# Patient Record
Sex: MALE | Race: WHITE | NOT HISPANIC OR LATINO | Employment: PART TIME | URBAN - METROPOLITAN AREA
[De-identification: names, ages, dates, MRNs, and addresses within clinical notes are randomized per-mention and may not be internally consistent; named-entity substitution may affect disease eponyms.]

---

## 2017-03-09 ENCOUNTER — HOSPITAL ENCOUNTER (INPATIENT)
Facility: CLINIC | Age: 27
LOS: 2 days | Discharge: HOME | DRG: 881 | End: 2017-03-11
Attending: PSYCHIATRY & NEUROLOGY | Admitting: PSYCHIATRY & NEUROLOGY
Payer: COMMERCIAL

## 2017-03-09 DIAGNOSIS — F33.2 SEVERE EPISODE OF RECURRENT MAJOR DEPRESSIVE DISORDER, WITHOUT PSYCHOTIC FEATURES (H): ICD-10-CM

## 2017-03-09 DIAGNOSIS — H66.90 ACUTE OTITIS MEDIA, UNSPECIFIED LATERALITY, UNSPECIFIED OTITIS MEDIA TYPE: Primary | ICD-10-CM

## 2017-03-09 PROBLEM — R45.851 SUICIDAL IDEATION: Status: ACTIVE | Noted: 2017-03-09

## 2017-03-09 LAB
AMPHETAMINES UR QL SCN: ABNORMAL
BARBITURATES UR QL: ABNORMAL
BENZODIAZ UR QL: ABNORMAL
CANNABINOIDS UR QL SCN: ABNORMAL
COCAINE UR QL: ABNORMAL
ETHANOL UR QL SCN: ABNORMAL
OPIATES UR QL SCN: ABNORMAL

## 2017-03-09 PROCEDURE — 12400001 ZZH R&B MH UMMC

## 2017-03-09 PROCEDURE — 99285 EMERGENCY DEPT VISIT HI MDM: CPT | Performed by: PSYCHIATRY & NEUROLOGY

## 2017-03-09 PROCEDURE — 80307 DRUG TEST PRSMV CHEM ANLYZR: CPT | Performed by: PSYCHIATRY & NEUROLOGY

## 2017-03-09 PROCEDURE — 80320 DRUG SCREEN QUANTALCOHOLS: CPT | Performed by: PSYCHIATRY & NEUROLOGY

## 2017-03-09 PROCEDURE — 99284 EMERGENCY DEPT VISIT MOD MDM: CPT | Mod: Z6 | Performed by: PSYCHIATRY & NEUROLOGY

## 2017-03-09 PROCEDURE — 25000132 ZZH RX MED GY IP 250 OP 250 PS 637: Performed by: PSYCHIATRY & NEUROLOGY

## 2017-03-09 PROCEDURE — 90791 PSYCH DIAGNOSTIC EVALUATION: CPT

## 2017-03-09 RX ORDER — LORAZEPAM 1 MG/1
1 TABLET ORAL 3 TIMES DAILY
Status: DISCONTINUED | OUTPATIENT
Start: 2017-03-10 | End: 2017-03-11 | Stop reason: HOSPADM

## 2017-03-09 RX ORDER — ALUMINA, MAGNESIA, AND SIMETHICONE 2400; 2400; 240 MG/30ML; MG/30ML; MG/30ML
30 SUSPENSION ORAL EVERY 4 HOURS PRN
Status: DISCONTINUED | OUTPATIENT
Start: 2017-03-09 | End: 2017-03-11 | Stop reason: HOSPADM

## 2017-03-09 RX ORDER — ESTRADIOL 1 MG/1
2 TABLET ORAL
Status: DISCONTINUED | OUTPATIENT
Start: 2017-03-09 | End: 2017-03-11 | Stop reason: HOSPADM

## 2017-03-09 RX ORDER — OLANZAPINE 10 MG/2ML
10 INJECTION, POWDER, FOR SOLUTION INTRAMUSCULAR
Status: DISCONTINUED | OUTPATIENT
Start: 2017-03-09 | End: 2017-03-11 | Stop reason: HOSPADM

## 2017-03-09 RX ORDER — HALOPERIDOL 0.5 MG/1
1 TABLET ORAL 2 TIMES DAILY
Status: DISCONTINUED | OUTPATIENT
Start: 2017-03-09 | End: 2017-03-11 | Stop reason: HOSPADM

## 2017-03-09 RX ORDER — OLANZAPINE 10 MG/1
10 TABLET ORAL
Status: DISCONTINUED | OUTPATIENT
Start: 2017-03-09 | End: 2017-03-11 | Stop reason: HOSPADM

## 2017-03-09 RX ORDER — FLUTICASONE PROPIONATE 50 MCG
2 SPRAY, SUSPENSION (ML) NASAL DAILY
Status: DISCONTINUED | OUTPATIENT
Start: 2017-03-10 | End: 2017-03-11 | Stop reason: HOSPADM

## 2017-03-09 RX ORDER — FLUOXETINE 10 MG/1
20 CAPSULE ORAL DAILY
COMMUNITY
End: 2017-10-23

## 2017-03-09 RX ORDER — LORATADINE 10 MG/1
10 TABLET ORAL AT BEDTIME
Status: DISCONTINUED | OUTPATIENT
Start: 2017-03-09 | End: 2017-03-11 | Stop reason: HOSPADM

## 2017-03-09 RX ORDER — ESTRADIOL 1 MG/1
2 TABLET ORAL
COMMUNITY
End: 2017-08-16

## 2017-03-09 RX ORDER — LORAZEPAM 1 MG/1
1-4 TABLET ORAL EVERY 30 MIN PRN
Status: DISCONTINUED | OUTPATIENT
Start: 2017-03-09 | End: 2017-03-11 | Stop reason: HOSPADM

## 2017-03-09 RX ORDER — HYDROXYZINE HYDROCHLORIDE 25 MG/1
25-50 TABLET, FILM COATED ORAL EVERY 4 HOURS PRN
Status: DISCONTINUED | OUTPATIENT
Start: 2017-03-09 | End: 2017-03-11 | Stop reason: HOSPADM

## 2017-03-09 RX ORDER — EMTRICITABINE AND TENOFOVIR DISOPROXIL FUMARATE 200; 300 MG/1; MG/1
1 TABLET, FILM COATED ORAL DAILY
Status: DISCONTINUED | OUTPATIENT
Start: 2017-03-10 | End: 2017-03-11 | Stop reason: HOSPADM

## 2017-03-09 RX ORDER — ACYCLOVIR 200 MG/1
400 CAPSULE ORAL 2 TIMES DAILY
Status: DISCONTINUED | OUTPATIENT
Start: 2017-03-09 | End: 2017-03-11 | Stop reason: HOSPADM

## 2017-03-09 RX ORDER — ACETAMINOPHEN 325 MG/1
650 TABLET ORAL EVERY 4 HOURS PRN
Status: DISCONTINUED | OUTPATIENT
Start: 2017-03-09 | End: 2017-03-11 | Stop reason: HOSPADM

## 2017-03-09 RX ORDER — EMTRICITABINE AND TENOFOVIR DISOPROXIL FUMARATE 200; 300 MG/1; MG/1
1 TABLET, FILM COATED ORAL DAILY
COMMUNITY
End: 2020-10-27

## 2017-03-09 RX ADMIN — ESTRADIOL 2 MG: 1 TABLET ORAL at 21:58

## 2017-03-09 RX ADMIN — HALOPERIDOL 1 MG: 0.5 TABLET ORAL at 21:58

## 2017-03-09 RX ADMIN — ACYCLOVIR 400 MG: 200 CAPSULE ORAL at 21:59

## 2017-03-09 RX ADMIN — LORATADINE 10 MG: 10 TABLET ORAL at 21:58

## 2017-03-09 ASSESSMENT — ACTIVITIES OF DAILY LIVING (ADL)
TOILETING: 0-->INDEPENDENT
BATHING: 0-->INDEPENDENT
RETIRED_COMMUNICATION: 0-->UNDERSTANDS/COMMUNICATES WITHOUT DIFFICULTY
PRIOR_FUNCTIONAL_LEVEL_COMMENT: INDEPENDENT
RETIRED_EATING: 0-->INDEPENDENT
FALL_HISTORY_WITHIN_LAST_SIX_MONTHS: NO
TRANSFERRING: 0-->INDEPENDENT
SWALLOWING: 0-->SWALLOWS FOODS/LIQUIDS WITHOUT DIFFICULTY
AMBULATION: 0-->INDEPENDENT
COGNITION: 0 - NO COGNITION ISSUES REPORTED
DRESS: 0-->INDEPENDENT

## 2017-03-09 ASSESSMENT — ENCOUNTER SYMPTOMS
FEVER: 0
HALLUCINATIONS: 0
CHEST TIGHTNESS: 0
NERVOUS/ANXIOUS: 1
BACK PAIN: 0
DYSPHORIC MOOD: 1
ABDOMINAL PAIN: 0
SHORTNESS OF BREATH: 0
DIZZINESS: 0

## 2017-03-09 NOTE — SUMMARY OF CARE
Patient search completed; pt wearing scrub top, pt wanded with metal detector and belongings given to security to be stored. Explained to patient that they would be evaluated by a nurse here in the ER and then would go over to the [facility] when a bed is available where they will meet with a doctor and an accessor; plan will be determined from there.

## 2017-03-09 NOTE — IP AVS SNAPSHOT
MRN:[id number]                      After Visit Summary   3/9/2017    Nay Lainez    MRN: [id number]           Thank you!     Thank you for choosing [facility] for your care. Our goal is always to provide you with excellent care.        Patient Information     Date Of Birth          1990        About your hospital stay     You were admitted on:  March 9, 2017 You last received care in the:  [facility]    You were discharged on:  March 11, 2017       Who to Call     For medical emergencies, please call 911.  For non-urgent questions about your medical care, please call your primary care provider or clinic, [phone]          Attending Provider     Provider Specialty    Manny Singer MD Emergency Medicine    [facility]       Primary Care Provider Office Phone # Fax #    [facility] [phone]       [facility] [address] [facility] [address]        Further instructions from your care team       Behavioral Discharge Planning and Instructions    Summary:   Patient was admitted with worsening depression, suicidal ideation. Patient was feeling unsafe at home.  Medications were reviewed and adjusted per MD's. Patient was observed for safety. She reports she is feeling better and denies further thoughts of self harm.    Patent will return home and will resume care with her outpatient providers.    Main Diagnosis:   Major Depressive Disorder  Alcohol Use Disorder  Anorexia Nervosa     Major Treatments, Procedures and Findings:   Medications were  managed throughout your stay. An internal medicine consult was completed during your stay. You had the opportunity to participate in treatment programming while on the unit including occupational therapy, mental health support and education and spiritual services.     Symptoms to Report:   Please report if you are experiencing increased aggression and/or confusion, problematic loss of sleep,  worsening mood, or thoughts of suicide to your treatment team or notify your primary provider.   IF THE SYMPTOMS YOU ARE EXPERIENCING ARE A MEDICAL EMERGENCY, CALL 911 IMMEDIATELY    Lifestyle Adjustment:   1. Adjust your lifestyle to get enough sleep, relaxation, exercise and good nutrition.  Continue to develop healthy coping skills to decrease stress and promote a healthy and sober lifestyle.  2. Abstain from all substances of abuse.  3. Take medications as prescribed.  Please work with your doctor to discuss any concerns you have with your medications or side effects you may be experiencing.  4. Follow up with appointments as scheduled.        Psychiatry Follow-up:   Appointment : Psychiatry: Leatha Villanueva: 3/27/17 at 2:20pm  Psych Recovery: [address] [facility] [address] [address]  [phone]    Resources:   *[facility] Crisis: COPE: (814.228.9830) 24 hour mobile crisis support for people having a mental health crisis in [facility].   *Acute Psychiatric Services ([phone]). 24-hour walk-in crisis psychiatric support at [facility]; Emergency Medications Clinic available 7:30am - 2:00pm  *Crisis Connection: ([phone])  24-hour confidential telephone counseling   *[facility] Emergency Room: [phone]    General Medication Instructions:   See your medication sheet(s) for instructions.   Take all medicines as directed.  Make no changes unless your doctor suggests them.   Go to all your doctor visits.  Be sure to have all your required lab tests. This way, your medicines can be refilled on time.  Do not use any drugs not prescribed by your doctor.    The treatment team has appreciated the opportunity to work with you.  We wish you the best in the future.   If you have any questions or concerns our unit number is [phone]. .      Pending Results     No orders found from 3/7/2017 to 3/10/2017.            Admission Information  "    Date & Time Department Dept. Phone    3/9/2017 [facility] [phone]      Your Vitals Were     Blood Pressure Pulse Temperature Respirations Height Weight    116/69 58 98.1  F (36.7  C) 12 1.829 m (6') 61.2 kg (135 lb)    Pulse Oximetry BMI (Body Mass Index)                97% 18.31 kg/m2          Beatsy Information     Be[employer]y lets you send messages to your doctor, view your test results, renew your prescriptions, schedule appointments and more. To sign up, go to [email or web address] . Click on \"Log in\" on the left side of the screen, which will take you to the Welcome page. Then click on \"Sign up Now\" on the right side of the page.     You will be asked to enter the access code listed below, as well as some personal information. Please follow the directions to create your username and password.     Your access code is: [email or web address]  Expires: 2017  8:12 PM     Your access code will  in 90 days. If you need help or a new code, please call your [facility] clinic or [phone].        Care EveryWhere ID     This is your Care EveryWhere ID. This could be used by other organizations to access your [facility] medical records  [id number]           Review of your medicines      START taking        Dose / Directions    amoxicillin 875 MG tablet   Commonly known as:  AMOXIL   Indication:  Otitis Media   Used for:  Acute otitis media, unspecified laterality, unspecified otitis media type        Dose:  875 mg   Take 1 tablet (875 mg) by mouth every 12 hours   Quantity:  8 tablet   Refills:  0         CONTINUE these medicines which have NOT CHANGED        Dose / Directions    ACYCLOVIR PO        Dose:  400 mg   Take 400 mg by mouth daily   Refills:  0       estradiol 1 MG tablet   Commonly known as:  ESTRACE        Dose:  2 mg   Take 2 mg by mouth 2 times daily 2 tabs 2 times a day   Refills:  0       FLUoxetine 10 MG capsule   Commonly known as:  PROzac        Dose:  10 mg   Take 10 mg by mouth daily Takes 3 " pills   Refills:  0       fluticasone 50 MCG/ACT spray   Commonly known as:  FLONASE        Dose:  2 spray   Spray 2 sprays into both nostrils daily   Refills:  0       haloperidol 1 MG tablet   Commonly known as:  HALDOL        Dose:  1 mg   Take 1 mg by mouth 2 times daily   Refills:  0       loratadine 10 MG tablet   Commonly known as:  CLARITIN        Dose:  10 mg   Take 10 mg by mouth At Bedtime   Refills:  0       LORAZEPAM PO        Dose:  1 mg   Take 1 mg by mouth 3 times daily   Refills:  0       TRUVADA 200-300 MG per tablet   Generic drug:  emtricitabine-tenofovir        Dose:  1 tablet   Take 1 tablet by mouth daily   Refills:  0            Where to get your medicines      These medications were sent to [facility] [place], [place] - [address]  [address] [address]     Phone:  [phone]     amoxicillin 875 MG tablet                Protect others around you: Learn how to safely use, store and throw away your medicines at www.disposemymeds.org.             Medication List: This is a list of all your medications and when to take them. Check marks below indicate your daily home schedule. Keep this list as a reference.      Medications           Morning Afternoon Evening Bedtime As Needed    ACYCLOVIR PO   Take 400 mg by mouth daily   Last time this was given:  400 mg on 3/11/2017  8:29 AM                                amoxicillin 875 MG tablet   Commonly known as:  AMOXIL   Take 1 tablet (875 mg) by mouth every 12 hours   Last time this was given:  875 mg on 3/11/2017  8:29 AM                                estradiol 1 MG tablet   Commonly known as:  ESTRACE   Take 2 mg by mouth 2 times daily 2 tabs 2 times a day   Last time this was given:  2 mg on 3/11/2017  8:29 AM                                FLUoxetine 10 MG capsule   Commonly known as:  PROzac   Take 10 mg by mouth daily Takes 3 pills   Last time this was given:  20 mg on 3/11/2017  8:29 AM                                 fluticasone 50 MCG/ACT spray   Commonly known as:  FLONASE   Spray 2 sprays into both nostrils daily   Last time this was given:  2 sprays on 3/11/2017  8:29 AM                                haloperidol 1 MG tablet   Commonly known as:  HALDOL   Take 1 mg by mouth 2 times daily   Last time this was given:  1 mg on 3/11/2017  8:29 AM                                loratadine 10 MG tablet   Commonly known as:  CLARITIN   Take 10 mg by mouth At Bedtime   Last time this was given:  10 mg on 3/10/2017  8:07 PM                                LORAZEPAM PO   Take 1 mg by mouth 3 times daily   Last time this was given:  1 mg on 3/11/2017  8:29 AM                                TRUVADA 200-300 MG per tablet   Take 1 tablet by mouth daily   Last time this was given:  1 tablet on 3/11/2017  9:00 AM   Generic drug:  emtricitabine-tenofovir

## 2017-03-09 NOTE — IP AVS SNAPSHOT
[address] [address]    Phone:  [phone]                                       After Visit Summary   3/9/2017    Nay Lainez    MRN: [id number]           After Visit Summary Signature Page     I have received my discharge instructions, and my questions have been answered. I have discussed any challenges I see with this plan with the nurse or doctor.    ..........................................................................................................................................  Patient/Patient Representative Signature      ..........................................................................................................................................  Patient Representative Print Name and Relationship to Patient    ..................................................               ................................................  Date                                            Time    ..........................................................................................................................................  Reviewed by Signature/Title    ...................................................              ..............................................  Date                                                            Time

## 2017-03-10 LAB
ALBUMIN SERPL-MCNC: 3.7 G/DL (ref 3.4–5)
ALP SERPL-CCNC: 72 U/L (ref 40–150)
ALT SERPL W P-5'-P-CCNC: 21 U/L (ref 0–50)
ANION GAP SERPL CALCULATED.3IONS-SCNC: 7 MMOL/L (ref 3–14)
AST SERPL W P-5'-P-CCNC: 17 U/L (ref 0–45)
BASOPHILS # BLD AUTO: 0 10E9/L (ref 0–0.2)
BASOPHILS NFR BLD AUTO: 0.2 %
BILIRUB SERPL-MCNC: 0.5 MG/DL (ref 0.2–1.3)
BUN SERPL-MCNC: 12 MG/DL (ref 7–30)
CALCIUM SERPL-MCNC: 8.4 MG/DL (ref 8.5–10.1)
CHLORIDE SERPL-SCNC: 108 MMOL/L (ref 94–109)
CHOLEST SERPL-MCNC: 140 MG/DL
CO2 SERPL-SCNC: 26 MMOL/L (ref 20–32)
CREAT SERPL-MCNC: 0.84 MG/DL (ref 0.52–1.04)
DIFFERENTIAL METHOD BLD: ABNORMAL
EOSINOPHIL # BLD AUTO: 0.1 10E9/L (ref 0–0.7)
EOSINOPHIL NFR BLD AUTO: 2.7 %
ERYTHROCYTE [DISTWIDTH] IN BLOOD BY AUTOMATED COUNT: 12.3 % (ref 10–15)
GFR SERPL CREATININE-BSD FRML MDRD: 81 ML/MIN/1.7M2
GLUCOSE SERPL-MCNC: 85 MG/DL (ref 70–99)
HCT VFR BLD AUTO: 44.7 % (ref 35–47)
HDLC SERPL-MCNC: 42 MG/DL
HGB BLD-MCNC: 16 G/DL (ref 11.7–15.7)
IMM GRANULOCYTES # BLD: 0 10E9/L (ref 0–0.4)
IMM GRANULOCYTES NFR BLD: 0 %
LDLC SERPL CALC-MCNC: 76 MG/DL
LYMPHOCYTES # BLD AUTO: 1.9 10E9/L (ref 0.8–5.3)
LYMPHOCYTES NFR BLD AUTO: 39.6 %
MAGNESIUM SERPL-MCNC: 2 MG/DL (ref 1.6–2.3)
MCH RBC QN AUTO: 33.4 PG (ref 26.5–33)
MCHC RBC AUTO-ENTMCNC: 35.8 G/DL (ref 31.5–36.5)
MCV RBC AUTO: 93 FL (ref 78–100)
MONOCYTES # BLD AUTO: 0.3 10E9/L (ref 0–1.3)
MONOCYTES NFR BLD AUTO: 5.7 %
NEUTROPHILS # BLD AUTO: 2.5 10E9/L (ref 1.6–8.3)
NEUTROPHILS NFR BLD AUTO: 51.8 %
NONHDLC SERPL-MCNC: 98 MG/DL
NRBC # BLD AUTO: 0 10*3/UL
NRBC BLD AUTO-RTO: 0 /100
PHOSPHATE SERPL-MCNC: 3.7 MG/DL (ref 2.5–4.5)
PLATELET # BLD AUTO: 183 10E9/L (ref 150–450)
POTASSIUM SERPL-SCNC: 4.2 MMOL/L (ref 3.4–5.3)
PROT SERPL-MCNC: 7.2 G/DL (ref 6.8–8.8)
RBC # BLD AUTO: 4.79 10E12/L (ref 3.8–5.2)
SODIUM SERPL-SCNC: 141 MMOL/L (ref 133–144)
TRIGL SERPL-MCNC: 108 MG/DL
TSH SERPL DL<=0.005 MIU/L-ACNC: 0.56 MU/L (ref 0.4–4)
WBC # BLD AUTO: 4.9 10E9/L (ref 4–11)

## 2017-03-10 PROCEDURE — 80061 LIPID PANEL: CPT | Performed by: PSYCHIATRY & NEUROLOGY

## 2017-03-10 PROCEDURE — 25000132 ZZH RX MED GY IP 250 OP 250 PS 637: Performed by: PSYCHIATRY & NEUROLOGY

## 2017-03-10 PROCEDURE — 12400001 ZZH R&B MH UMMC

## 2017-03-10 PROCEDURE — 84443 ASSAY THYROID STIM HORMONE: CPT | Performed by: PSYCHIATRY & NEUROLOGY

## 2017-03-10 PROCEDURE — 80053 COMPREHEN METABOLIC PANEL: CPT | Performed by: PSYCHIATRY & NEUROLOGY

## 2017-03-10 PROCEDURE — 36415 COLL VENOUS BLD VENIPUNCTURE: CPT | Performed by: PSYCHIATRY & NEUROLOGY

## 2017-03-10 PROCEDURE — 25000132 ZZH RX MED GY IP 250 OP 250 PS 637: Performed by: PHYSICIAN ASSISTANT

## 2017-03-10 PROCEDURE — 83735 ASSAY OF MAGNESIUM: CPT | Performed by: PSYCHIATRY & NEUROLOGY

## 2017-03-10 PROCEDURE — 99222 1ST HOSP IP/OBS MODERATE 55: CPT | Performed by: PHYSICIAN ASSISTANT

## 2017-03-10 PROCEDURE — 99223 1ST HOSP IP/OBS HIGH 75: CPT | Mod: GC | Performed by: PSYCHIATRY & NEUROLOGY

## 2017-03-10 PROCEDURE — 84100 ASSAY OF PHOSPHORUS: CPT | Performed by: PSYCHIATRY & NEUROLOGY

## 2017-03-10 PROCEDURE — 85025 COMPLETE CBC W/AUTO DIFF WBC: CPT | Performed by: PSYCHIATRY & NEUROLOGY

## 2017-03-10 RX ORDER — AMOXICILLIN 875 MG
875 TABLET ORAL EVERY 12 HOURS SCHEDULED
Status: DISCONTINUED | OUTPATIENT
Start: 2017-03-10 | End: 2017-03-11 | Stop reason: HOSPADM

## 2017-03-10 RX ORDER — AMOXICILLIN 875 MG
875 TABLET ORAL EVERY 12 HOURS
Qty: 8 TABLET | Refills: 0 | Status: SHIPPED | OUTPATIENT
Start: 2017-03-11 | End: 2017-03-10

## 2017-03-10 RX ORDER — AMOXICILLIN 875 MG
875 TABLET ORAL EVERY 12 HOURS
Qty: 8 TABLET | Refills: 0 | Status: SHIPPED
Start: 2017-03-11 | End: 2017-05-23

## 2017-03-10 RX ADMIN — FLUOXETINE 20 MG: 20 CAPSULE ORAL at 08:48

## 2017-03-10 RX ADMIN — LORATADINE 10 MG: 10 TABLET ORAL at 20:07

## 2017-03-10 RX ADMIN — AMOXICILLIN 875 MG: 875 TABLET, FILM COATED ORAL at 20:06

## 2017-03-10 RX ADMIN — ESTRADIOL 2 MG: 1 TABLET ORAL at 15:58

## 2017-03-10 RX ADMIN — FLUTICASONE PROPIONATE 2 SPRAY: 50 SPRAY, METERED NASAL at 08:49

## 2017-03-10 RX ADMIN — LORAZEPAM 1 MG: 1 TABLET ORAL at 20:07

## 2017-03-10 RX ADMIN — ESTRADIOL 2 MG: 1 TABLET ORAL at 08:48

## 2017-03-10 RX ADMIN — HALOPERIDOL 1 MG: 0.5 TABLET ORAL at 20:07

## 2017-03-10 RX ADMIN — ACYCLOVIR 400 MG: 200 CAPSULE ORAL at 08:48

## 2017-03-10 RX ADMIN — EMTRICITABINE AND TENOFOVIR DISOPROXIL FUMARATE 1 TABLET: 200; 300 TABLET, FILM COATED ORAL at 08:49

## 2017-03-10 RX ADMIN — ACYCLOVIR 400 MG: 200 CAPSULE ORAL at 20:06

## 2017-03-10 RX ADMIN — LORAZEPAM 1 MG: 1 TABLET ORAL at 13:45

## 2017-03-10 RX ADMIN — LORAZEPAM 1 MG: 1 TABLET ORAL at 08:48

## 2017-03-10 RX ADMIN — HALOPERIDOL 1 MG: 0.5 TABLET ORAL at 08:48

## 2017-03-10 ASSESSMENT — ACTIVITIES OF DAILY LIVING (ADL)
HYGIENE/GROOMING: INDEPENDENT
DRESS: STREET CLOTHES
LAUNDRY: WITH SUPERVISION
ORAL_HYGIENE: INDEPENDENT
HYGIENE/GROOMING: INDEPENDENT
LAUNDRY: WITH SUPERVISION
DRESS: STREET CLOTHES
ORAL_HYGIENE: INDEPENDENT

## 2017-03-10 NOTE — ED PROVIDER NOTES
History     Chief Complaint   Patient presents with     Suicidal     suicidal with plan to od. feels homicidal against people in general     The history is provided by the spouse, the patient and medical records.     Nay Lainez is a 26 year old male to female transgender who comes in due to her worsening depression, anxiety and strong suicidal thoughts.  This seems to have been getting worse since she had to change her psychiatrist due to her former psychiatrist leaving the practice.  She has been put ativan which has made things worse.  She took 3 today instead of one as a way to try to make herself feel better.  The last week has been the worse with strong suicidal thoughts to overdose on the ativan. She does not think she can stay safe.  She was last hospitalized 7/2016 at [place].    Please see the 's assessment for further details.    I have reviewed the Medications, Allergies, Past Medical and Surgical History, and Social History in the Epic system.    Review of Systems   Constitutional: Negative for fever.   Eyes: Negative for visual disturbance.   Respiratory: Negative for chest tightness and shortness of breath.    Cardiovascular: Negative for chest pain.   Gastrointestinal: Negative for abdominal pain.   Musculoskeletal: Negative for back pain.   Neurological: Negative for dizziness.   Psychiatric/Behavioral: Positive for dysphoric mood and suicidal ideas. Negative for hallucinations and self-injury. The patient is nervous/anxious.    All other systems reviewed and are negative.      Physical Exam   BP: 116/78  Pulse: 82  Temp: 97.4  F (36.3  C)  Resp: 16  SpO2: 96 %  Physical Exam   Constitutional: She is oriented to person, place, and time. She appears well-developed and well-nourished.   HENT:   Head: Normocephalic and atraumatic.   Mouth/Throat: Oropharynx is clear and moist.   Eyes: Pupils are equal, round, and reactive to light.   Neck: Normal range of motion. Neck supple.    Cardiovascular: Normal rate, regular rhythm and normal heart sounds.    Pulmonary/Chest: Effort normal and breath sounds normal.   Abdominal: Soft. Bowel sounds are normal.   Musculoskeletal: Normal range of motion.   Neurological: She is alert and oriented to person, place, and time.   Skin: Skin is warm and dry.   Psychiatric: Her speech is normal and behavior is normal. Judgment normal. Her mood appears anxious. She is not actively hallucinating. Thought content is not paranoid and not delusional. Cognition and memory are normal. She exhibits a depressed mood. She expresses suicidal ideation. She expresses no homicidal ideation. She expresses suicidal plans. She expresses no homicidal plans.   Nay is a 25 y/o male to female transgender who looks her age.  She is well groomed with good eye contact.  She has multiple piercings.   Nursing note and vitals reviewed.      ED Course     ED Course     Procedures               Labs Ordered and Resulted from Time of ED Arrival Up to the Time of Departure from the ED - No data to display    Assessments & Plan (with Medical Decision Making)   Nay will be admitted to the hospital due to her suicidal thoughts with a plan, worsening depression and not being able to be safe at home.  She will go to station 20 under Dr. Mathur.    I have reviewed the nursing notes.    I have reviewed the findings, diagnosis, plan and need for follow up with the patient.    New Prescriptions    No medications on file       Final diagnoses:   Severe episode of recurrent major depressive disorder, without psychotic features (H)       3/9/2017   [facility], [place], EMERGENCY DEPARTMENT     Manny Singer MD  03/09/17 [id number]

## 2017-03-10 NOTE — DISCHARGE INSTRUCTIONS
Behavioral Discharge Planning and Instructions    Summary:   Patient was admitted with worsening depression, suicidal ideation. Patient was feeling unsafe at home.  Medications were reviewed and adjusted per MD's. Patient was observed for safety. She reports she is feeling better and denies further thoughts of self harm.    Patent will return home and will resume care with her outpatient providers.    Main Diagnosis:   Major Depressive Disorder  Alcohol Use Disorder  Anorexia Nervosa     Major Treatments, Procedures and Findings:   Medications were  managed throughout your stay. An internal medicine consult was completed during your stay. You had the opportunity to participate in treatment programming while on the unit including occupational therapy, mental health support and education and spiritual services.     Symptoms to Report:   Please report if you are experiencing increased aggression and/or confusion, problematic loss of sleep, worsening mood, or thoughts of suicide to your treatment team or notify your primary provider.   IF THE SYMPTOMS YOU ARE EXPERIENCING ARE A MEDICAL EMERGENCY, CALL 911 IMMEDIATELY    Lifestyle Adjustment:   1. Adjust your lifestyle to get enough sleep, relaxation, exercise and good nutrition.  Continue to develop healthy coping skills to decrease stress and promote a healthy and sober lifestyle.  2. Abstain from all substances of abuse.  3. Take medications as prescribed.  Please work with your doctor to discuss any concerns you have with your medications or side effects you may be experiencing.  4. Follow up with appointments as scheduled.        Psychiatry Follow-up:   Appointment : Psychiatry: Leatha Villanueva: 3/27/17 at 2:20pm  Psych Recovery: [address] [facility] [address] [address]  [phone]    Resources:   *[facility] Crisis: COPE: ([phone]) 24 hour mobile crisis support for people having a mental health crisis in [facility].   *Acute  Psychiatric Services ([phone]). 24-hour walk-in crisis psychiatric support at [facility]; Emergency Medications Clinic available 7:30am - 2:00pm  *Crisis Connection: ([phone])  24-hour confidential telephone counseling   *[facility] Emergency Room: [phone]    General Medication Instructions:   See your medication sheet(s) for instructions.   Take all medicines as directed.  Make no changes unless your doctor suggests them.   Go to all your doctor visits.  Be sure to have all your required lab tests. This way, your medicines can be refilled on time.  Do not use any drugs not prescribed by your doctor.    The treatment team has appreciated the opportunity to work with you.  We wish you the best in the future.   If you have any questions or concerns our unit number is [phone]. .

## 2017-03-10 NOTE — PROGRESS NOTES
----------------------------------------------------------------------------------------------------------  [facility], [place]   Psychiatric Progress Note     Assessment    Presentation: Nay Lainez, a 26 year old  M to F transgender pt, with history of previous psychiatric admissions, EtOH use disorder, AN, ASPD, anxiety and depression, HIV, and EtOh use disorder, who presented to the ED via ride from a friend due to increasing suicidal ideation with plan to overdose on medications (valium) inc ontext of increased stressors.     Diagnostic Impression: Pt is a 27yo M to F transgender pt with hx of previous psychiatric admissions(most recent in 7/2016 on station 20), EtOH use disorder, AN, ASPD, anxiety and depression. Patient has hx of several psychiatric hospitalization with last hospitalized on Station [facility] in July 2016 for similar presentation. Biological contributions may include Alcohol use disorder in sister, mother and maternal grandfather. Hx symptoms include SIB with cutting of ankles, and mild auditory hallucinations. Patient reported anxiety as a major factor of his current hospitalization and as a continuing problem. Psychosocial stressors include a recent relapse on EtOh (1L vodka), grandmother's death in Jan, being denied SSDI, change in outpatient psychiatrist and change in fluoxetine dosage up to 30 mg. Patient's presentation is consistent with anxiety with worsening SI.     Hospital course: Nay Lainez was admitted to station 20 as a voluntary patient. Patient's dosage of fluoxetine was reduced to 20 mg. Patient complained of a possible double ear infection and medicine consult was placed.  She has been clinically stable and doing well on the unit. She denies active suicidal thoughts. She believes that keeping the fluoxetine at 20mg is of most clinical benefit for her. She is currently living with her , not working. She spends her time hanging out with  neighbors and friends.     Medical course: Medicine was consulted for a possible double ear infection.    Plan     Principal Diagnosis: Anxiety with Suicidal Ideation, moderate, with mild use disorder  Medications: Continue outpatient medications:    -Fluoxetine 20mg PO daily    -Lorazepam 1mg TID   -Haloperidol 1mg BID  Laboratory/Imaging: CBC, CMP, Lipids wnl  Consults: Inpatient Medicine  Patient will be treated in therapeutic milieu with appropriate individual and group therapies as described.    Secondary Diagnosis: Alcohol Use Disorder  -as above    Medical diagnoses to be addressed this admission:  Bilateral otitis media   Plan: Medicine was consulted. Appreciate their recs, 4 day antibiotic course   Consults: Medicine     Relevant psychosocial stressors: EtOH use, death of grandmother, Denial of SSDI, Change in Psychiatrist, Fluoxetine dosage change    Legal Status: Voluntary    Safety Assessment:   Checks: Status 15  Precautions: Suicide  Self-harm  Pt has not required locked seclusion or restraints in the past 24 hours to maintain safety, please refer to RN documentation for further details.     The risks, benefits, alternatives and side effects have been discussed and are understood by the patient and other caregivers.    Anticipated Disposition/Discharge Date: Likely tomorrow 3/11 to home if pt is stabilized.    This document was prepared by Delma Fernandes MS3 for Dr. Scottie Solis on 03/10/17 at 11:31 AM     Attestation:  I have reviewed and edited the documentation recorded by the scribe. This documentation accurately reflects the services I personally performed and treatment decisions made by me in consultation with the attending physician.     Scottie Solis MD, PGY1  Pager [phone]    Attestation:  I have reviewed the resident admit note and confirmed by my exam today the HPI, past psych, PMH, ROS, meds, allergies, family and social histories.  I, Bernardo Mathur, saw and evaluated the patient  with the resident physician.  I agree with the findings and plan of care as documented in the resident note.  I have reviewed all labs and vital signs.             Interim History:   The patient's care was discussed with the treatment team and chart notes were reviewed.  PRNs: none  Sleep: 7 hours    Per nursing notes patient had an uneventful night.     Nay is feeling better today. However she describes her dreams as being very vivid and had a hard time  them from reality yesterday. She reports that yesterday she woke up and just had a nervous breakdown but for no particular reason or trigger. One thought is that the fluoxetine was planned to beincreased from 10 to 30 mg and recently going from 20 to 30 might have been a trigger.     Pt reports poor sleep for the past 6 months to a year, averaging about 4 hours a night. The trouble is with falling asleep and waking up intermittently resulting in frequent naps during the day. Pt also reports poor appetite but feels she has been maintaining weight.  Patient was concerned about a possible ear infection and we agreed to consult the medicine team.    Anxiety is a very concerning problem and pt reports multiple panic attacks per week. This anxiety prevents her from working or leaving the house. She was prescribed Ativan 1 month ago by a physician at [facility].  Pt requested to stay on fluoxetine at 20 mg and ativan for the anxiety. Otherwise feels that mood is improved and would like to go home. We discussed a possible discharge tomorrow.    Pt reports that an HIV test was done 2 weeks ago at the [facility] and that it was negative. HIV testing was offered but the Pt is not interested in redoing the test at this time. Pt reports that Truvada is for prophylaxis.    Review of systems:     The 10 point Review of Systems is negative other than noted in the HPI         Medications:     Current Facility-Administered Medications   Medication      acyclovir (ZOVIRAX) capsule 400 mg     emtricitabine-tenofovir (TRUVADA) 200-300 MG per tablet 1 tablet     estradiol (ESTRACE) tablet 2 mg     FLUoxetine (PROzac) capsule 20 mg     fluticasone (FLONASE) 50 MCG/ACT spray 2 spray     haloperidol (HALDOL) tablet 1 mg     loratadine (CLARITIN) tablet 10 mg     LORazepam (ATIVAN) tablet 1 mg     hydrOXYzine (ATARAX) tablet 25-50 mg     acetaminophen (TYLENOL) tablet 650 mg     alum & mag hydroxide-simethicone (MYLANTA ES/MAALOX  ES) suspension 30 mL     OLANZapine (zyPREXA) tablet 10 mg    Or     OLANZapine (zyPREXA) injection 10 mg     LORazepam (ATIVAN) tablet 1-4 mg             Allergies:   No Known Allergies         Psychiatric Examination:   /76  Pulse 61  Temp 97.7  F (36.5  C) (Oral)  Resp 15  Ht 1.829 m (6')  Wt 61.2 kg (135 lb)  SpO2 97%  BMI 18.31 kg/m2  Weight is 135 lbs 0 oz  Body mass index is 18.31 kg/(m^2).    Appearance:  awake, alert and adequately groomed  Attitude:  cooperative  Eye Contact:  good  Mood:  better  Affect:  appropriate and in normal range  Speech:  clear, coherent  Psychomotor Behavior:  no evidence of tardive dyskinesia, dystonia, or tics  Thought Process:  logical and linear  Associations:  no loose associations  Thought Content:  no evidence of suicidal ideation or homicidal ideation  Insight:  good  Judgment:  intact  Oriented to:  time, person, and place  Attention Span and Concentration:  intact  Recent and Remote Memory:  intact  Language: Intact  Fund of Knowledge: appropriate  Muscle Strength and Tone: normal  Gait and Station: Normal         Labs:     Recent Results (from the past 24 hour(s))   Drug abuse screen 6 urine (tox)    Collection Time: 03/09/17  7:42 PM   Result Value Ref Range    Amphetamine Qual Urine  NEG     Negative   Cutoff for a negative amphetamine is 500 ng/mL or less.      Barbiturates Qual Urine  NEG     Negative   Cutoff for a negative barbiturate is 200 ng/mL or less.      Benzodiazepine Qual Urine   NEG     Negative   Cutoff for a negative benzodiazepine is 200 ng/mL or less.      Cannabinoids Qual Urine (A) NEG     Positive   Cutoff for a positive cannabinoid is greater than 50 ng/mL. This is an   unconfirmed screening result to be used for medical purposes only.      Cocaine Qual Urine  NEG     Negative   Cutoff for a negative cocaine is 300 ng/mL or less.      Ethanol Qual Urine  NEG     Negative   Cutoff for a negative urine ethanol is 0.05 g/dL or less      Opiates Qualitative Urine  NEG     Negative   Cutoff for a negative opiate is 300 ng/mL or less.     CBC with platelets differential    Collection Time: 03/10/17  7:35 AM   Result Value Ref Range    WBC 4.9 4.0 - 11.0 10e9/L    RBC Count 4.79 3.8 - 5.2 10e12/L    Hemoglobin 16.0 (H) 11.7 - 15.7 g/dL    Hematocrit 44.7 35.0 - 47.0 %    MCV 93 78 - 100 fl    MCH 33.4 (H) 26.5 - 33.0 pg    MCHC 35.8 31.5 - 36.5 g/dL    RDW 12.3 10.0 - 15.0 %    Platelet Count 183 150 - 450 10e9/L    Diff Method Automated Method     % Neutrophils 51.8 %    % Lymphocytes 39.6 %    % Monocytes 5.7 %    % Eosinophils 2.7 %    % Basophils 0.2 %    % Immature Granulocytes 0.0 %    Nucleated RBCs 0 0 /100    Absolute Neutrophil 2.5 1.6 - 8.3 10e9/L    Absolute Lymphocytes 1.9 0.8 - 5.3 10e9/L    Absolute Monocytes 0.3 0.0 - 1.3 10e9/L    Absolute Eosinophils 0.1 0.0 - 0.7 10e9/L    Absolute Basophils 0.0 0.0 - 0.2 10e9/L    Abs Immature Granulocytes 0.0 0 - 0.4 10e9/L    Absolute Nucleated RBC 0.0    Magnesium    Collection Time: 03/10/17  7:35 AM   Result Value Ref Range    Magnesium 2.0 1.6 - 2.3 mg/dL   Phosphorus    Collection Time: 03/10/17  7:35 AM   Result Value Ref Range    Phosphorus 3.7 2.5 - 4.5 mg/dL   Comprehensive metabolic panel    Collection Time: 03/10/17  7:35 AM   Result Value Ref Range    Sodium 141 133 - 144 mmol/L    Potassium 4.2 3.4 - 5.3 mmol/L    Chloride 108 94 - 109 mmol/L    Carbon Dioxide 26 20 - 32 mmol/L    Anion Gap 7 3 - 14 mmol/L    Glucose 85  70 - 99 mg/dL    Urea Nitrogen 12 7 - 30 mg/dL    Creatinine 0.84 0.52 - 1.04 mg/dL    GFR Estimate 81 >60 mL/min/1.7m2    GFR Estimate If Black >90   GFR Calc   >60 mL/min/1.7m2    Calcium 8.4 (L) 8.5 - 10.1 mg/dL    Bilirubin Total 0.5 0.2 - 1.3 mg/dL    Albumin 3.7 3.4 - 5.0 g/dL    Protein Total 7.2 6.8 - 8.8 g/dL    Alkaline Phosphatase 72 40 - 150 U/L    ALT 21 0 - 50 U/L    AST 17 0 - 45 U/L   TSH with free T4 reflex and/or T3 as indicated    Collection Time: 03/10/17  7:35 AM   Result Value Ref Range    TSH 0.56 0.40 - 4.00 mU/L   Lipid panel reflex to direct LDL    Collection Time: 03/10/17  7:35 AM   Result Value Ref Range    Cholesterol 140 <200 mg/dL    Triglycerides 108 <150 mg/dL    HDL Cholesterol 42 (L) >49 mg/dL    LDL Cholesterol Calculated 76 <100 mg/dL    Non HDL Cholesterol 98 <130 mg/dL

## 2017-03-10 NOTE — PROGRESS NOTES
Occupational Therapy Non Attendance: Pt has not been seen since admission in scheduled OT sessions. Encourage group attendance as able to identify treatment goals, identify MI sx and their impact on occupational performance, implement positive coping skills. Pt will be given Self Assessment form, explain the purpose of including them in their treatment plan and offer options for meeting their needs.

## 2017-03-10 NOTE — PHARMACY-ADMISSION MEDICATION HISTORY
Admission medication history interview status for the 3/9/2017 admission is complete. See Epic admission navigator for allergy information, pharmacy, prior to admission medications and immunization status.     Medication history interview sources:  None     Changes made to PTA medication list (reason)  Added: None   Deleted: None   Changed: None     Additional medication history information (including reliability of information, actions taken by pharmacist):{[id number]::    Was not able to interview the patient and complete medication history review. The patient thinks that her medication list  Was  already completed.       Prior to Admission medications    Medication Sig Last Dose Taking? Auth Provider   estradiol (ESTRACE) 1 MG tablet Take 2 mg by mouth 2 times daily 2 tabs 2 times a day 3/9/2017 at Unknown time Yes Reported, Patient   LORAZEPAM PO Take 1 mg by mouth 3 times daily 3/9/2017 at Unknown time Yes Reported, Patient   FLUoxetine (PROZAC) 10 MG capsule Take 10 mg by mouth daily Takes 3 pills 3/9/2017 at Unknown time Yes Reported, Patient   emtricitabine-tenofovir (TRUVADA) 200-300 MG per tablet Take 1 tablet by mouth daily 3/9/2017 at Unknown time Yes Reported, Patient   ACYCLOVIR PO Take 400 mg by mouth daily   Yes Reported, Patient   fluticasone (FLONASE) 50 MCG/ACT nasal spray Spray 2 sprays into both nostrils daily 3/8/2017 at Unknown time Yes Reported, Patient   haloperidol (HALDOL) 1 MG tablet Take 1 mg by mouth 2 times daily  3/9/2017 at Unknown time Yes Reported, Patient   loratadine (CLARITIN) 10 MG tablet Take 10 mg by mouth At Bedtime  3/8/2017 at Unknown time Yes Reported, Patient         Medication history completed by: Marianne Marte

## 2017-03-10 NOTE — ED NOTES
States she has had depression.  Has been feeling suicidal with thoughts of OD.  Homicidal.  Not toward anyone specific. Has not felt safe for the past 2 days.  Here with .   Has been suicidal in the past but never acted on her suicidal thoughts.

## 2017-03-10 NOTE — PROGRESS NOTES
03/10/17 1434   Behavioral Health   Hallucinations denies / not responding to hallucinations   Thinking distractable   Orientation time: oriented;date: oriented;place: oriented;person: oriented   Memory baseline memory   Insight insight appropriate to situation   Judgement impaired   Eye Contact at examiner   Affect blunted, flat   Mood depressed   Physical Appearance/Attire appears stated age   Hygiene well groomed   Suicidality other (see comments)  ([place])   Self Injury other (see comment)  (Denies)   Activity isolative   Speech clear;coherent   Psychomotor / Gait steady;balanced   Psycho Education   Type of Intervention 1:1 intervention   Response participates, initiates socially appropriate   Hours 0.5   Activities of Daily Living   Hygiene/Grooming independent   Oral Hygiene independent   Dress street clothes   Laundry with supervision   Room Organization independent   Behavioral Health Interventions   Depression maintain safety precautions;assist patient in developing safety plan;assist patient in following safety plan   Social and Therapeutic Interventions (Depression) encourage socialization with peers;encourage participation in therapeutic groups and milieu activities   Pt did not attend any groups this shift and pt was observed reading in the room. She denies suicidal ideation or hearing voices.

## 2017-03-10 NOTE — PLAN OF CARE
Problem: Depressive Symptoms  Goal: Depressive Symptoms  Signs and symptoms of listed problems will be absent or manageable.  25 yo male to female transgender admitted on a voluntary basis. She is depressed and suicidal. She states she has plans to overdose but has not followed through. She does have a hx of SIB and cut her (R) leg 2 weeks ago. The cuts are healed. She is cooperative and pleasant. She states she has been sober of ETOH 57 days. She states she was sober for 2 years and went on a 2 day mccarthy and now is sober again. 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 She is cooperative and pleasant. Her pupils are very dilated.

## 2017-03-10 NOTE — DISCHARGE SUMMARY
----------------------------------------------------------------------------------------------------------  [facility], [place]   Discharge Summary      Nay Lainez MRN# [id number]   Age: 26 year old YOB: 1990     Date of Admission:  3/9/2017  Date of Discharge:  3/11/2017  Admitting Physician:  Bernardo Mathur MD  Discharge Physician:  Patt Russo MD         Event Leading to Hospitalization:     Nay Lainez, a 26 year old  M to F transgender pt, with history of previous psychiatric admissions, EtOH use disorder, AN, ASPD, anxiety and depression and EtOh use disorder, who presented to the ED via ride from a friend due to increasing suicidal ideation with plan to overdose on medications (valium) inc ontext of increased stressors.           See Admission note by Bernardo Mathur MD on 3/9/17 for additional details.          Diagnoses:     Principal Diagnosis: Anxiety, MDD with Suicidal Ideation, moderate         Labs:     CBC, CMP, Lipids wnl  UTOX- Positive for cannabinoids          Consults:     Consultation during this admission received from internal medicine. Patient was found to have bilateral otitis media, she was prescribed 4 day course of amoxicillin.          Hospital Course:     Nay Lainez was admitted to station 20 as a voluntary patient. Patient's dosage of fluoxetine was reduced to 20 mg. Patient complained of a possible double ear infection and medicine consult was placed. She has been clinically stable and doing well on the unit. She denies active suicidal thoughts. She believes that keeping the fluoxetine at 20mg is of most clinical benefit for her. She is currently living with her , not working. She spends her time hanging out with neighbors and friends.     The patient's symptoms of anxiety, depression, and suicidal thoughts improved.     Nay Lainez was discharged to home. At the time of discharge Nay Lainez was  determined to not be a danger to herself or others.    . Today Nay Lainez reports no suicidal ideation. In addition, Nay Lainez has notable risk factors for self-harm, including anxiety, substance abuse and previous suicide attempts. However, risk is mitigated by ability to volunteer a safety plan and history of seeking help when needed.Additional steps taken to minimize risk include: outpatient follow up appointments. Therefore, based on all available evidence including the factors cited above, Nay Lainez does not appear to be at imminent risk for self-harm, and is appropriate for outpatient level of care.     This document serves as a transfer of care to Nay Lainez's outpatient providers.         Discharge Medications:     Psychiatry Medications Dose/Frequecy  -Fluoxetine 20mg PO daily   -Lorazepam 1mg TID  -Haloperidol 1mg BID         Psychiatric Examination:   Appearance: awake, alert and adequately groomed  Attitude: cooperative  Eye Contact: good  Mood: better  Affect: appropriate and in normal range  Speech: clear, coherent  Psychomotor Behavior: no evidence of tardive dyskinesia, dystonia, or tics  Thought Process: logical and linear  Associations: no loose associations  Thought Content: no evidence of suicidal ideation or homicidal ideation  Insight: good  Judgment: intact  Oriented to: time, person, and place  Attention Span and Concentration: intact  Recent and Remote Memory: intact  Language: Intact  Fund of Knowledge: appropriate  Muscle Strength and Tone: normal  Gait and Station: Normal         Discharge Plan:     Psychiatry Follow-up:   Appointment : Psychiatry: Leatha Villanueva: 3/27/17 at 2:20pm  [facility]: [facility] [address] [address]  [phone]    Scottie Solis MD  PGY 1 Psychiatry Resident     Attestation:    IPatt, saw and evaluated this patient prior to discharge.  I personally reviewed vital signs, medications and  labs.    I personally spent 25 minutes on discharge activities.

## 2017-03-10 NOTE — CONSULTS
"  Internal Medicine Initial Visit    Nay Lainez MRN# [id number]   Age: 26 year old YOB: 1990   Date of Admission: 3/9/2017     Reason for consult: Bilateral Ear Pain       Requesting physician Dr. Scottie Solis      SUBJECTIVE   HPI:   Nay Lainez is a 26 year old male-to-female transgender with history of depression and anxiety admitted [facility] for suicidal and homicidal ideation. Medicine was consulted to evaluate patient's bilateral ear pain.    Patient presented to the ED 3/9 with suicidal ideation and plans to overdose on her ativan. She was then admitted to psychiatry for further stabilization. Most recent psychiatric hospitalization 7/2016.    Patient complains of bilateral ear pain, R>L over the past 3 days. Denies drainage. She has also noticed some \"echoing\" with noises in her right ear. Also endorses nasal congestion. History of ear infections as a child, but none recently. Otherwise no fevers, chills, HA, sore throat, cough, chest pain, SOB, abdominal pain, nausea, vomiting, dysuria, constipation, diarrhea, or peripheral edema.     Past Medical History:     Past Medical History   Diagnosis Date     Anxiety      Depressive disorder      Male-to-female transgender person       Reviewed & updated in [facility].     Past Surgical History:      Past Surgical History   Procedure Laterality Date     Cosmetic surgery       lip and nose nurgeries     Breast surgery       augmentation      Reviewed & updated in Epic.     Medications prior to admission:     Prior to Admission Medications   Prescriptions Last Dose Informant Patient Reported? Taking?   ACYCLOVIR PO  Self Yes Yes   Sig: Take 400 mg by mouth daily    FLUoxetine (PROZAC) 10 MG capsule 3/9/2017 at Unknown time Self Yes Yes   Sig: Take 10 mg by mouth daily Takes 3 pills   LORAZEPAM PO 3/9/2017 at Unknown time Self Yes Yes   Sig: Take 1 mg by mouth 3 times daily   emtricitabine-tenofovir (TRUVADA) 200-300 MG per tablet 3/9/2017 at " Unknown time Self Yes Yes   Sig: Take 1 tablet by mouth daily   estradiol (ESTRACE) 1 MG tablet 3/9/2017 at Unknown time Self Yes Yes   Sig: Take 2 mg by mouth 2 times daily 2 tabs 2 times a day   fluticasone (FLONASE) 50 MCG/ACT nasal spray 3/8/2017 at Unknown time Self Yes Yes   Sig: Spray 2 sprays into both nostrils daily   haloperidol (HALDOL) 1 MG tablet 3/9/2017 at Unknown time Self Yes Yes   Sig: Take 1 mg by mouth 2 times daily    loratadine (CLARITIN) 10 MG tablet 3/8/2017 at Unknown time Self Yes Yes   Sig: Take 10 mg by mouth At Bedtime       Facility-Administered Medications: None         Allergies:   No Known Allergies      Social History:   Tobacco Use: Denies  Alcohol Use: Denies  Illicit Drug Use: Smokes marijuana every few days  STI Testing: Declines at this time - was tested 2 weeks ago     Family History:   History reviewed. No pertinent family history.     Reviewed & updated in Epic.     Review of Systems:   Ten point review of systems negative except as stated above in History of Present Illness.    OBJECTIVE   Physical Exam:   Blood pressure 124/76, pulse 61, temperature 98.1  F (36.7  C), temperature source Oral, resp. rate 16, height 1.829 m (6'), weight 61.2 kg (135 lb), SpO2 97 %.  General: Well-appearing  female sitting upright in bed, NAD.  HEENT: NC/AT. Anicteric sclera. Bilateral TM erythematous, R>L. No cerumen or drainage. Eyes symmetric and free of discharge bilaterally. Mucous membranes moist.  Neck: Supple  Cardiovascular: RRR. S1/S2. No murmurs appreciated.  Lungs: Normal respiratory effort on RA. Lungs CTAB without wheezes, rales, or rhonchi.  Abdomen: Soft, non-tender, and nondistended with normoactive bowel sounds.  Extremities: Warm & well perfused. No peripheral edema.  Skin: No rashes, jaundice, or lesions on exposed areas of skin.  Neurologic: A&O X 3. Answers questions appropriately. Moves all extremities symmetrically.     Laboratory Data:   CMP  Recent Labs  Lab  03/10/17  0735      POTASSIUM 4.2   CHLORIDE 108   CO2 26   ANIONGAP 7   GLC 85   BUN 12   CR 0.84   GFRESTIMATED 81   GFRESTBLACK >90African American GFR Calc   CARRIE 8.4*   MAG 2.0   PHOS 3.7   PROTTOTAL 7.2   ALBUMIN 3.7   BILITOTAL 0.5   ALKPHOS 72   AST 17   ALT 21       CBC  Recent Labs  Lab 03/10/17  0735   WBC 4.9   RBC 4.79   HGB 16.0*   HCT 44.7   MCV 93   MCH 33.4*   MCHC 35.8   RDW 12.3          TSH  Recent Labs  Lab 03/10/17  0735   TSH 0.56          Assessment and Plan/Recommendations:   Nay Lainez is a 26 year old male-to-female transgender with history of depression and anxiety admitted to [facility] for suicidal and homicidal ideation. Medicine was consulted to evaluate patient's bilateral ear pain.    Depression, Anxiety with SI. Intent to OD on ativan.   - Management per Psychiatry    Bilateral Ear Pain. For past 3 days, R>L. Bilateral TM erythematous on exam. Denies hearing loss. WBC WNL, pt afebrile.  - Amoxicillin 875 mg BID x 5 days (discharge prescription ordered)  - If symptoms fail to resolve by the end of antibiotic course, follow up with PCP    Medicine will sign off at this time. Please page the Internal Medicine job code pager for any intercurrent medical issues which arise. Thank you for the opportunity to be a part of this patient's care.    Chelsie Turcios PA-C  Hospitalist Service  [phone]

## 2017-03-10 NOTE — PROGRESS NOTES
03/09/17 2049   Patient Belongings   Did you bring any home meds/supplements to the hospital?  Yes   Disposition of meds  Sent to security/pharmacy per site process   Patient Belongings body jewelry;cell phone/electronics;clothing;earings;glasses;jewelry;keys;shoes;tote;wallet   Disposition of Belongings meds given to nurse, valuables sent to security in #[id number], the rest given to pt as appropriate and in locker   Belongings Search Yes   Clothing Search Yes   Second Staff Krista     Meds given to nurse in envelope # [id number]    Valuables sent to security # [id number] : 2 X [place] drivers licenses, check # [id number], Visa - [phone], [facility] Visa - [phone]    Belongings: glasses, multiples peircings, multiple socks, multiple underwear, cell phone, cell phone , under shirts, shirts, jeans, backpack, shawl sweater, shoes, jacket, key chain w/ mace attached, toiletries, wallet, various business, insurance and membership cards    ADMISSION:  I am responsible for any personal items that are not sent to the safe or pharmacy. [facility] is not responsible for loss, theft or damage of any property in my possession.    Patient Signature _____________________ Date/Time _____________________    Staff Signature _______________________ Date/Time _____________________    2nd Staff person, if patient is unable/unwilling to sign  ___________________________________ Date/Time _____________________    DISCHARGE:  My personal items have been returned to me.   Patient Signature _____________________ Date/Time _____________________

## 2017-03-10 NOTE — PLAN OF CARE
Problem: General Plan of Care (Inpatient Behavioral)  Goal: Individualization/Patient Specific Goal (IP Behavioral)  The patient and/or their representative will achieve their patient-specific goals related to the plan of care.    The patient-specific goals include:  Illness Management Recovery model: Objectives    Patient will identify reason(s) for hospitalization from their perspective.  Patient will identify a minimum of three goals for discharge.  Patient will identify a minimum of three triggers that may increase their symptoms.  Patient will identify a minimum of three coping skills they can do to stay well.   Patient will identify their support system to demonstrate readiness for discharge.   Patient's goals:  Improved mood- absence of SI     Plan for admission:  1. Stabilization of mood disorder sx's  2. Absence of SI- safe with self  3. Medication mgmt per MD's  4. Coordination of care with outpatient providers  5. Psych f/u care in place     Illness Management Recovery model: Personal Plan of Care     Patient will complete  Personal Plan of Care, identifying reasons for hospitalization and goals for discharge. Form will be reviewed in team meeting and signed by patient, physician, writer and  RN. Form given to [facility] to be scanned into [facility].

## 2017-03-10 NOTE — PROGRESS NOTES
INITIAL PSYCHOSOCIAL ASSESSMENT   I have reviewed the chart met with the patient, and developed Care Plan.  Information for assessment was obtained from: Patient/patient chart    Presenting Problem:   Patient is 27yo M to F transgender pt with hx of previous psychiatric admissions, EtOH use disorder, AN, ASPD, anxiety and depression who presented to ED via ride from friend due to increasing suicidal ideation with plan to overdose on medications (valium) inc ontext of increased stressors. Stressors have included relapse for 2 days on EtOH (1L Vodka consumed) in Jan, grandmother recently passed away, she was denied SSI, her outpatient psychiatrist changed and she had medication changes.  Patient has a long h/o eating disorder and has been restricting her eating, having only 1 meal/day which consists of a half of a frozen pizza.    The following areas have been assessed:  History of Mental Health and Chemical Dependency:  Patient has an extensive psychiatric history with multiple past admissions at [facility] and [facility]- most recently 7/2016.  Patient denies any h/o suicide attempts    Patient has a h/o alcohol dependence. Had been sober since July 2016 however relapsed in Jan  CD treatment: 1 week at [facility] in July 2016.    Family Description (Const[facility], Family Psychiatric History):   Patient was born/raised in [place] Patient reports being estranged from family    Patient is  x 2 years    Family history is significant for alcohol dependence in mother, sister MGF    Significant Life Events (Illness, Abuse, Trauma, Death):  Patient is transgender.  On hormone replacement  Patient denies any h/o physical/sexual abuse    Living Situation:    Patient lives in [place] with      Educational Background:   HS graduate- some college coursework    Occupational History:   Patient is unemployed. Last worked in the Fall 2016.  Has worked retail, , [employer]    Financial Status:    No immediate  concerns     Legal Issues:    Patient denies    Ethnic/Cultural Issues:  No concerns identified    Spiritual Orientation:    None                       Service History:   N/A    Social Functioning (organization, interests):  Spends days hanging out with neighbors. Patient reports anxiety has interfered with social functioning                                                        Current Treatment Providers are:  Psychiatrist: Bonita Ramirez:[facility]  Therapist: [facility]    Social Service Assessment/Plan:  Patient will have ongoing psychiatric assessment.  Medications will be reviewed and adjusted per MD as indicated. Outpatient providers will be contacted for care coordination. Recent substance use will be addressed.  Hospital staff will provide a safe environment and a therapeutic milieu. Patient will be encouraged to participate in unit groups and activities.    Will  ensure appropriate follow up care is in place.

## 2017-03-10 NOTE — H&P
"  -----------------------------------------------------------------------------------------------------------------------------------------------------------  Psychiatry History & Physical    Date of admission: 3/9/2017    Contact information:  - Outpatient Psychiatrist: Unsure of name, located at [facility]  - Therapist Ni at [facility]  - Primary Physician: Ruy, [facility]    Chief Complaint: \"Yesterday my depression got bad and I was so suicidal\"    HPI:  Pt is a 25yo M to F transgender pt with hx of previous psychiatric admissions, EtOH use disorder, AN, ASPD, anxiety and depression who presented to ED via ride from friend due to increasing suicidal ideation with plan to overdose on medications (valium) inc ontext of increased stressors. She was last [facility] in July 2016 for similar presentation and reports things have \"not been great\" since that admission, however then states that she has been doing better over the last several weeks, especially since she started to take fluoxetine and lorazepam 20 days ago but yesterday she woke up and started to \"realize just how bad [she] felt\" and became increasingly suicidal. She is unable to elaborate on how she managed over the past 24 hours without acting on her plan or what had changed that led her to come in today. Per DEC assessment recent stressors have included relapse for 2 days on EtOH (1L Vodka consumed) in Jan, grandmother recently passed away, she was denied SSI, her outpatient psychiatrist changed and she had medication changes which included initiating and increasing dose of fluoxetine (10mg/week to 30mg) and transitioning from clonazepam to valium, per patient. However calls to pharmacy verify that pt was switched to lorazepam, not diazepam. Pt also reports that she took all 3 doses of her lorazepam 1mg prior to noon this morning (utox negative for benzodiazepines however). Per pharmacy pt last filled 30 " "day supply of clonazepam 0.5 TID and 1mg QHS on Feb 13th and 30 day supply of lorazepam 1mg TID on Feb 20th, review of chart indicates possibility of BZD abuse. Pt denies any current or previous substance use (despite Utox being positive for cannabinoids) and has been sober from EtOH for 57 days; she participates in AA meetings regularly.     In regards to depression she reports experiencing for several months poor sleep (4hrs), anergy, decreased appetite, constant suicidal ideation and some homicidal ideation of \"killing men\" but denies having any specific targets or history of violence/aggression. She denies a history of suicidal attempts. She does have a history of SIB involving cutting her ankles with a razor that started when she was a teenager, she last engaged in this 1 month ago. She denies history of solitario. She does have a history of \"seeing blips out of [her] eyes\" and hearing her husbands voice when he is not speaking, which the haloperidol she takes helps with. She endorses \"tons of anxiety\" feeling like she is crawling out of her skin with panic attacks occurring every day up to 3 times a day that include increased heart rate and shortness of breath. These attacks are not prompted by any known event and can last from 10 minutes to several hours.  She has been restricting her eating, having only 1 meal/day which consists of a half of a frozen pizza. Denies binging or purging. She believes that increasing her fluoxetine recently from 20mg to 30mg may be contributing to her worsening suicidal ideation and requests decrease in dose. Her goals for hospitalization are to no longer have suicidal thoughts with plan and intent. Her  was present initially but she asked him to step out and did not wish for writer or team to contact him. She also reports that she has cut off communication from family recently and does not wish for anyone to be contacted for updates/collateral. Please see DEC assessment for " further history.     Psychiatric ROS: See HPI    Suicide risk:   - Risk factors: anxiety, hx of substance abuse and recent loss of grandmother.   - Protective factors: current sobriety, absence of past attempts and history of seeking help when needed.    Psychiatric History:   - Inpatient treatment: Several at [facility], last [facility] 7/2016  - Suicide attempts: denies  - Self-injurious behavior: See HPI  - Diagnoses: Per chart--EtOH use d/o; Anorexia Nervosa; paranoid SZ; per pt--ASPD, AN, anxiety and depression  - ECT: denies  - Medication trials: long history of BZD use and suspected abuse (clonazepam, lorazepam, diazepam, alprazolam), gabapentin for EtOH, quetiapine, bupropion, citalopram, haldol (current), fluoxetine (current)  - Commitment: None    Substance use History:  - EtOH use disorder, early remission, no hx of complicated withdrawal  - Denies other substance use (utox is positive for cannabinoids) including any IVDU and denies concerns for exposure to dirty needles while receiving tattoos  - Previous CD treatment: 1 week at [facility] in July 2016    Family History:  - EtOH use disorder in sister, mother and maternal grandfather  - Denies any known MH history  - No known completed suicides in relatives.    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 She graduated HS and completed some college. Denies any legal history of history of abuse.    Abuse history:   - No hx of physical, sexual, emotional abuse.    Past Medical History:  Past Medical History   Diagnosis Date     Anxiety      Depressive disorder      Male-to-female transgender person      - No history of seizures.  - No history of head  injury/LOC.    Past Surgical History:  Past Surgical History   Procedure Laterality Date     Cosmetic surgery       lip and nose nurgeries     Breast surgery       augmentation       Allergies:   No Known Allergies    Current Medications:  - Fluoxetine 30mg QD  - Lorazepam 1mg TID   - Haloperidol 1mg BID  - loratidine 10mg QHS  - estradiol 2mg BID  - Truvada 200-300mg QD  - Acyclovir 400mg PO QD  - Fluticasone 2 sprays both nostrils QD    Physical ROS: Negative except as discussed in HPI.    Physical Exam completed by Dr. Singer, please see their note for complete details.     Mental Status Exam: Alert, awake, thin, appears recorded age. Multiple tattoos and piercings visible, curled up in chair in room wearing hospital scrubs. Somewhat uncooperative with interview, often becoming irritable with questions being asked. Guarded. She was attentive to interviewer with fair eye contact, speech was clear and spontaneous with normal rate. Thought process was logical, linear and goal oriented and content notable for active suicidal ideation. Pt did not appear disorganized or appear to be responding to internal stimuli. Mood reported as depressed and affected with congruent and blunted. Insight and judgement are fair. Cognition not formally tested but appears intact.    Labs:  Utox positive for cannabinoids  -------------------------------------------------------------------------------------------------------------------  Assessment: Nay Lainez is a 26 year old M to F transgender pt with hx of previous psychiatric admissions, EtOH use disorder, AN, ASPD, anxiety and depression who presented to ED via ride from friend due to increasing suicidal ideation with plan to overdose on medications (valium) in context of increased stressors.  The patient's last psychiatric hospitalization was in July 2016.  The patient is currently followed by [facility] for medication management and [facility] for psychotherapy. Family history  is notable for EtOH use disorder. Current psychosocial stressors include relapse on EtOH use 2 months ago, loss of grandmother, medication and psychiatrist changes and being denied SSDI which she has been coping with by continuing to restrict eating, isolating and not leaving her home for the past 24 hours resulting in worsening depression and SI. The patient denies current drug abuse (utox +MJ) or recent self injurious behaviors in the past month. The MSE is notable for thin appear woman with reported depressed mood, blunted affect and active suicidal ideation. The patient's reported symptoms are consistent with historic diagnosis of depression, anxiety and anorexia nervosa--however further observation and collateral information would be helpful for diagnostic clarification given pts reported dx of ASPD and chart indications of schizophrenia. PTA medications were continued at time of admission, with the exception of lorazepam held this evening given pt took 3 doses already today and she was placed on MSSA for monitoring of BZD withdrawal given long standing hx of BZD use, recent multiple prescriptions and Utox being negative for BZD. Given that she is actively suicidal, patient warrants inpatient psychiatric hospitalization to maintain her safety. Disposition pending clinical stabilization, medication optimization and development of an appropriate discharge plan.    Plan:   Legal Status: Voluntary    Safety Assessment:   Behavioral Orders   Procedures     Code 1 - Restrict to Unit     Routine Programming     As clinically indicated     Self Injury Precaution     Single Room     Status 15     Every 15 minutes.     Suicide precautions     - Pt has not required locked seclusion or restraints in the past 24 hours to maintain safety.    Principal Diagnosis:   - Mood disorder NEC (MDD vs substance induced vs SZA)    Medications: Continue PTA medications with exceptions below  - Reduce Fluoxetine to 20mg QD  - HOLD  Lorazepam 1mg TID tonight  - Haloperidol 1mg BID  - loratidine 10mg QHS  - estradiol 2mg BID  - Truvada 200-300mg QD  - Acyclovir 400mg PO QD  - Fluticasone 2 sprays both nostrils QD    Laboratory/Imaging:   -CBC, CMP (including Mg and Phos given AN dx), TSH, lipid panel ordered     Social/Therapeutic:  - Patient will be treated in therapeutic milieu with appropriate individual and group therapies as described.    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 Mathur as attending provider, to be staffed 3/10/17.    =============================================================================  Thais Navarro,   PGY-2 Psychiatry Resident    ATTENDING:  3/10/17  Please see progress note 3/10/17.  Bernardo Mathur MD

## 2017-03-11 VITALS
OXYGEN SATURATION: 97 % | RESPIRATION RATE: 12 BRPM | HEART RATE: 58 BPM | WEIGHT: 135 LBS | HEIGHT: 72 IN | DIASTOLIC BLOOD PRESSURE: 69 MMHG | BODY MASS INDEX: 18.28 KG/M2 | SYSTOLIC BLOOD PRESSURE: 116 MMHG | TEMPERATURE: 98.1 F

## 2017-03-11 PROCEDURE — 99238 HOSP IP/OBS DSCHRG MGMT 30/<: CPT | Mod: GC | Performed by: PSYCHIATRY & NEUROLOGY

## 2017-03-11 PROCEDURE — 25000132 ZZH RX MED GY IP 250 OP 250 PS 637: Performed by: PHYSICIAN ASSISTANT

## 2017-03-11 PROCEDURE — 25000132 ZZH RX MED GY IP 250 OP 250 PS 637: Performed by: PSYCHIATRY & NEUROLOGY

## 2017-03-11 RX ADMIN — ACYCLOVIR 400 MG: 200 CAPSULE ORAL at 08:29

## 2017-03-11 RX ADMIN — ESTRADIOL 2 MG: 1 TABLET ORAL at 08:29

## 2017-03-11 RX ADMIN — AMOXICILLIN 875 MG: 875 TABLET, FILM COATED ORAL at 08:29

## 2017-03-11 RX ADMIN — FLUTICASONE PROPIONATE 2 SPRAY: 50 SPRAY, METERED NASAL at 08:29

## 2017-03-11 RX ADMIN — FLUOXETINE 20 MG: 20 CAPSULE ORAL at 08:29

## 2017-03-11 RX ADMIN — EMTRICITABINE AND TENOFOVIR DISOPROXIL FUMARATE 1 TABLET: 200; 300 TABLET, FILM COATED ORAL at 09:00

## 2017-03-11 RX ADMIN — HALOPERIDOL 1 MG: 0.5 TABLET ORAL at 08:29

## 2017-03-11 RX ADMIN — LORAZEPAM 1 MG: 1 TABLET ORAL at 08:29

## 2017-03-11 ASSESSMENT — ACTIVITIES OF DAILY LIVING (ADL)
DRESS: INDEPENDENT
HYGIENE/GROOMING: INDEPENDENT
ORAL_HYGIENE: INDEPENDENT

## 2017-03-11 NOTE — PROGRESS NOTES
Isolative and on the phone most of the shift. Monitor phone conversation. Talks loud and sometimes crosses boundaries sexually with whom she is calling with other patients around. Otherwise calm and quiet when in the milieu.

## 2017-03-11 NOTE — PROGRESS NOTES
Patient discharged to home with a plan to follow up weekly therapy on tuesdays (already in place) and psychiatry. Discharge AVS reviewed, as well as discharge medications. They have no further questions and are aware to call the unit any time after discharge should further questions arise.

## 2017-05-23 ENCOUNTER — HOSPITAL ENCOUNTER (INPATIENT)
Facility: CLINIC | Age: 27
LOS: 2 days | Discharge: HOME | DRG: 885 | End: 2017-05-26
Attending: PSYCHIATRY & NEUROLOGY | Admitting: PSYCHIATRY & NEUROLOGY
Payer: COMMERCIAL

## 2017-05-23 DIAGNOSIS — F25.0 SCHIZOAFFECTIVE DISORDER, BIPOLAR TYPE (H): Primary | ICD-10-CM

## 2017-05-23 DIAGNOSIS — F41.8 DEPRESSION WITH ANXIETY: ICD-10-CM

## 2017-05-23 DIAGNOSIS — F10.920 ALCOHOL INTOXICATION, UNCOMPLICATED (H): ICD-10-CM

## 2017-05-23 LAB
ALCOHOL BREATH TEST: 0.21 (ref 0–0.01)
AMPHETAMINES UR QL SCN: ABNORMAL
BARBITURATES UR QL: ABNORMAL
BENZODIAZ UR QL: ABNORMAL
CANNABINOIDS UR QL SCN: ABNORMAL
COCAINE UR QL: ABNORMAL
ETHANOL UR QL SCN: ABNORMAL
OPIATES UR QL SCN: ABNORMAL

## 2017-05-23 PROCEDURE — 80320 DRUG SCREEN QUANTALCOHOLS: CPT | Performed by: PSYCHIATRY & NEUROLOGY

## 2017-05-23 PROCEDURE — 90791 PSYCH DIAGNOSTIC EVALUATION: CPT

## 2017-05-23 PROCEDURE — 82075 ASSAY OF BREATH ETHANOL: CPT | Performed by: PSYCHIATRY & NEUROLOGY

## 2017-05-23 PROCEDURE — 80307 DRUG TEST PRSMV CHEM ANLYZR: CPT | Performed by: PSYCHIATRY & NEUROLOGY

## 2017-05-23 PROCEDURE — 99285 EMERGENCY DEPT VISIT HI MDM: CPT | Mod: 25 | Performed by: PSYCHIATRY & NEUROLOGY

## 2017-05-23 PROCEDURE — 99285 EMERGENCY DEPT VISIT HI MDM: CPT | Mod: Z6 | Performed by: PSYCHIATRY & NEUROLOGY

## 2017-05-23 PROCEDURE — HZ2ZZZZ DETOXIFICATION SERVICES FOR SUBSTANCE ABUSE TREATMENT: ICD-10-PCS | Performed by: PSYCHIATRY & NEUROLOGY

## 2017-05-23 RX ORDER — EMTRICITABINE AND TENOFOVIR DISOPROXIL FUMARATE 200; 300 MG/1; MG/1
1 TABLET, FILM COATED ORAL DAILY
Status: CANCELLED | OUTPATIENT
Start: 2017-05-24

## 2017-05-23 RX ORDER — LORATADINE 10 MG/1
10 TABLET ORAL AT BEDTIME
Status: CANCELLED | OUTPATIENT
Start: 2017-05-23

## 2017-05-23 RX ORDER — HYDROXYZINE HYDROCHLORIDE 25 MG/1
25-50 TABLET, FILM COATED ORAL EVERY 4 HOURS PRN
Status: CANCELLED | OUTPATIENT
Start: 2017-05-23

## 2017-05-23 RX ORDER — ESTRADIOL 2 MG/1
2 TABLET ORAL
Status: CANCELLED | OUTPATIENT
Start: 2017-05-24

## 2017-05-23 RX ORDER — CLONAZEPAM 1 MG/1
1 TABLET ORAL AT BEDTIME
COMMUNITY
End: 2017-10-22

## 2017-05-23 RX ORDER — HALOPERIDOL 1 MG/1
1 TABLET ORAL 2 TIMES DAILY
Status: CANCELLED | OUTPATIENT
Start: 2017-05-23

## 2017-05-23 RX ORDER — LORAZEPAM 1 MG/1
1 TABLET ORAL 3 TIMES DAILY
Status: CANCELLED | OUTPATIENT
Start: 2017-05-24

## 2017-05-23 RX ORDER — FLUTICASONE PROPIONATE 50 MCG
2 SPRAY, SUSPENSION (ML) NASAL DAILY
Status: CANCELLED | OUTPATIENT
Start: 2017-05-24

## 2017-05-23 ASSESSMENT — ENCOUNTER SYMPTOMS
SHORTNESS OF BREATH: 0
BACK PAIN: 0
NERVOUS/ANXIOUS: 1
ABDOMINAL PAIN: 0
FEVER: 0
HALLUCINATIONS: 1
DYSPHORIC MOOD: 1
DIZZINESS: 0
CHEST TIGHTNESS: 0

## 2017-05-23 NOTE — IP AVS SNAPSHOT
MRN:[id number]                      After Visit Summary   5/23/2017    Nay Lainez    MRN: [id number]           Thank you!     Thank you for choosing [facility] for your care. Our goal is always to provide you with excellent care.        Patient Information     Date Of Birth          1990        Designated Caregiver       Most Recent Value    Caregiver    Will someone help with your care after discharge? no      About your hospital stay     You were admitted on:  May 24, 2017 You last received care in the:  [facility]    You were discharged on:  May 26, 2017       Who to Call     For medical emergencies, please call 911.  For non-urgent questions about your medical care, please call your primary care provider or clinic, [phone]          Attending Provider     Provider Specialty    Manny Singer MD Emergency Medicine    Abigail Ronquillo MD Psychiatry       Primary Care Provider Office Phone # Fax #    Ventura Morelos [phone]       [facility] [place] [address] [facility] [address]        Your next 10 appointments already scheduled     Jun 06, 2017  1:00 PM CDT   Evaluation with GLENN Owens   [facility] ([facility])    [address] [id number] [phone]              Further instructions from your care team       Behavioral Discharge Planning and Instructions      Summary:  You were admitted on 5/23/2017  For worsenning symptoms including command auditory hallucinations in the context of recent medication non adherence and alchol use.  You were treated by Dr. Abigail Ronquillo MD and Dr. Bernardo Mathur MD and discharged on 5/26/2017 from [facility].      Health Care Follow-up Appointments:       Tuesday June 6, 2017 10:40am   Psychiatry  Provider: Leatha Trujillo RN, CNS  Address: [facility] [address] [facility]  [place] [address] Phone: [phone] Fax: [phone]      Tuesday June 6, 2017 1:00pm    Provider:  Meli ELIZABETH - intake assessment Day treatment   [facility] Day Treatment Located in [facility] Ground Floor [address] ; [address] [place] [address] Phone:[phone]      [facility] ([facility]) - [facility] 2nd Floor [address] [facility], [address] phone: [phone]   A referral for potential psychiatry clinic services was made to the intake department at the above clinic. They will contact you at home or you can also call the phone number listed and select the option for intake to inquire about your referral.     Attend all scheduled appointments with your outpatient providers. Call at least 24 hours in advance if you need to reschedule an appointment to ensure continued access to your outpatient providers.   Major Treatments, Procedures and Findings:  You were provided with: a psychiatric assessment, assessed for medical stability, medication evaluation and/or management, group therapy, individual therapy, milieu management and referral for day treatment programing     Symptoms to Report: feeling more aggressive, increased confusion, losing more sleep, mood getting worse or thoughts of suicide    Early warning signs can include: increased depression or anxiety sleep disturbances increased thoughts or behaviors of suicide or self-harm  increased unusual thinking, such as paranoia or hearing voices    Safety and Wellness:  Take all medicines as directed.  Make no changes unless your doctor suggests them.      Follow treatment recommendations.  Refrain from alcohol and non-prescribed drugs.     Resources:   Crisis Intervention: 119.823.8239 or 100-895-7380 (TTY: 243.838.5622).  Call anytime for help.  [place] [employer] Mental Illness (www.[place].monse.org): 275.294.7130 or  "115.866.5637.  Alcoholics Anonymous (www.alcoholics-anonymous.org): Check your phone book for your local chapter.  Mental [employer] Association of [place] (www.mentalhealth.org): 235.349.4455 or 858-767-7513  [facility] (COPE) Response - Adult [phone]    The treatment team has appreciated the opportunity to work with you.     If you have any questions or concerns our unit number is [phone]        Pending Results     No orders found from 2017 to 2017.            Statement of Approval     Ordered          17 1154  I have reviewed and agree with all the recommendations and orders detailed in this document.  EFFECTIVE NOW     Approved and electronically signed by:  Cesar Reagan MD             Admission Information     Date & Time Provider Department Dept. Phone    2017 Abigail Ronquillo MD  [facility] [phone]      Your Vitals Were     Blood Pressure Pulse Temperature Respirations Height Weight    117/58 59 97  F (36.1  C) 16 1.829 m (6') 58.5 kg (129 lb)    Last Period Pulse Oximetry BMI (Body Mass Index)             (LMP Unknown) 97% 17.5 kg/m2         MyChart Information     [facility] lets you send messages to your doctor, view your test results, renew your prescriptions, schedule appointments and more. To sign up, go to [email or web address] . Click on \"Log in\" on the left side of the screen, which will take you to the Welcome page. Then click on \"Sign up Now\" on the right side of the page.     You will be asked to enter the access code listed below, as well as some personal information. Please follow the directions to create your username and password.     Your access code is: [email or web address]  Expires: 2017  9:12 PM     Your access code will  in 90 days. If you need help or a new code, please call your [facility] clinic or [phone].        Care EveryWhere ID     This is your Care EveryWhere ID. This could be used by other organizations to access your " [facility] medical records  [id number]           Review of your medicines      START taking        Dose / Directions    lithium 300 MG CR tablet   Commonly known as:  ESKALITH/LITHOBID   Used for:  Schizoaffective disorder, bipolar type (H)        Dose:  300 mg   Take 1 tablet (300 mg) by mouth At Bedtime   Quantity:  30 tablet   Refills:  0         CONTINUE these medicines which have NOT CHANGED        Dose / Directions    ACYCLOVIR PO        Dose:  400 mg   Take 400 mg by mouth 2 times daily   Refills:  0       clonazePAM 1 MG tablet   Commonly known as:  klonoPIN        Dose:  1 mg   Take 1 mg by mouth nightly as needed for anxiety   Refills:  0       estradiol 1 MG tablet   Commonly known as:  ESTRACE        Dose:  2 mg   Take 2 mg by mouth 2 times daily   Refills:  0       FLUoxetine 10 MG capsule   Commonly known as:  PROzac        Dose:  20 mg   Take 20 mg by mouth daily   Refills:  0       fluticasone 50 MCG/ACT spray   Commonly known as:  FLONASE        Dose:  2 spray   Spray 2 sprays into both nostrils daily   Refills:  0       haloperidol 1 MG tablet   Commonly known as:  HALDOL        Dose:  1 mg   Take 1 mg by mouth 2 times daily   Refills:  0       loratadine 10 MG tablet   Commonly known as:  CLARITIN        Dose:  10 mg   Take 10 mg by mouth At Bedtime   Refills:  0       LORAZEPAM PO        Dose:  1 mg   Take 1 mg by mouth 3 times daily   Refills:  0       TRUVADA 200-300 MG per tablet   Indication:  PrEP   Generic drug:  emtricitabine-tenofovir        Dose:  1 tablet   Take 1 tablet by mouth daily   Refills:  0            Where to get your medicines      These medications were sent to [facility] [address]  [address] [address]     Phone:  [phone]     lithium 300 MG CR tablet                Protect others around you: Learn how to safely use, store and throw away your medicines at www.disposemymeds.org.             Medication  List: This is a list of all your medications and when to take them. Check marks below indicate your daily home schedule. Keep this list as a reference.      Medications           Morning Afternoon Evening Bedtime As Needed    ACYCLOVIR PO   Take 400 mg by mouth 2 times daily   Last time this was given:  400 mg on 5/26/2017  8:49 AM                                clonazePAM 1 MG tablet   Commonly known as:  klonoPIN   Take 1 mg by mouth nightly as needed for anxiety   Last time this was given:  1 mg on 5/25/2017  8:30 PM                                estradiol 1 MG tablet   Commonly known as:  ESTRACE   Take 2 mg by mouth 2 times daily   Last time this was given:  2 mg on 5/26/2017  8:50 AM                                FLUoxetine 10 MG capsule   Commonly known as:  PROzac   Take 20 mg by mouth daily   Last time this was given:  20 mg on 5/26/2017  8:50 AM                                fluticasone 50 MCG/ACT spray   Commonly known as:  FLONASE   Spray 2 sprays into both nostrils daily   Last time this was given:  2 sprays on 5/25/2017  8:29 PM                                haloperidol 1 MG tablet   Commonly known as:  HALDOL   Take 1 mg by mouth 2 times daily   Last time this was given:  1 mg on 5/26/2017  8:50 AM                                lithium 300 MG CR tablet   Commonly known as:  ESKALITH/LITHOBID   Take 1 tablet (300 mg) by mouth At Bedtime   Last time this was given:  300 mg on 5/25/2017 10:14 PM                                loratadine 10 MG tablet   Commonly known as:  CLARITIN   Take 10 mg by mouth At Bedtime   Last time this was given:  10 mg on 5/25/2017 10:15 PM                                LORAZEPAM PO   Take 1 mg by mouth 3 times daily   Last time this was given:  1 mg on 5/26/2017  1:46 PM                                TRUVADA 200-300 MG per tablet   Take 1 tablet by mouth daily   Last time this was given:  1 tablet on 5/26/2017  8:50 AM   Generic drug:  emtricitabine-tenofovir

## 2017-05-23 NOTE — ED NOTES
Patient arrives to [facility]. Psych Associate explains process, gives patient urine cup and questionnaire. Patient told about meeting with Mental Health  and Psychiatrist. Patient told about 2-5 hour time frame for complete evaluation.

## 2017-05-23 NOTE — ED PROVIDER NOTES
History     Chief Complaint   Patient presents with     Homicidal     reports she wants to kill her mother     Hallucinations     x3 weeks per friend     Alcohol Intoxication     4-5 shots today, last drink approximatly 10AM     The history is provided by the patient, medical records and the spouse.     Nay Lainez is a 26 year old male to female transgender who comes in with a friend due to Mikalya stating she had thoughts to hurt others.  She states she hears voices telling her to kill her mom.   She has no plans or intent to do this.  She told her friend that she is being told to kill everyone.  She supposedly drank today in order to get rid of the voices.  She has a breathalyzer of 0.212.  She denies being suicidal but is depressed and anxious which is baseline for her.  She is sleepy and currently a poor historian due to the intoxication.  Per the partner, she has not had her haldol in a week or so, which has heightened her paranoia and hallucinations.     Please see the 's assessment for further details.    I have reviewed the Medications, Allergies, Past Medical and Surgical History, and Social History in the Epic system.    Review of Systems   Constitutional: Negative for fever.   Eyes: Negative for visual disturbance.   Respiratory: Negative for chest tightness and shortness of breath.    Cardiovascular: Negative for chest pain.   Gastrointestinal: Negative for abdominal pain.   Musculoskeletal: Negative for back pain.   Neurological: Negative for dizziness.   Psychiatric/Behavioral: Positive for dysphoric mood and hallucinations. Negative for self-injury and suicidal ideas. The patient is nervous/anxious.    All other systems reviewed and are negative.      Physical Exam   BP: 104/71  Pulse: 78  Temp: 96.7  F (35.9  C)  Resp: 16  SpO2: 94 %  Physical Exam   Constitutional: She is oriented to person, place, and time. She appears well-developed and well-nourished.   HENT:   Head: Normocephalic  and atraumatic.   Mouth/Throat: Oropharynx is clear and moist.   Eyes: Pupils are equal, round, and reactive to light.   Neck: Normal range of motion. Neck supple.   Cardiovascular: Normal rate, regular rhythm and normal heart sounds.    Pulmonary/Chest: Effort normal and breath sounds normal.   Abdominal: Soft. Bowel sounds are normal.   Musculoskeletal: Normal range of motion.   Neurological: She is alert and oriented to person, place, and time.   Skin: Skin is warm and dry.   Psychiatric: Her speech is normal. Her mood appears anxious. She is actively hallucinating. Thought content is not paranoid and not delusional. Cognition and memory are normal. She expresses inappropriate judgment. She exhibits a depressed mood. She expresses homicidal ideation. She expresses no suicidal ideation. She expresses no suicidal plans and no homicidal plans.   Nay is a male to female transgender who looks her age.  She is well groomed with good eye contact.   Nursing note and vitals reviewed.      ED Course     ED Course     Procedures               Labs Ordered and Resulted from Time of ED Arrival Up to the Time of Departure from the ED   ALCOHOL BREATH TEST POCT - Abnormal; Notable for the following:        Result Value    Alcohol Breath Test 0.212 (*)     All other components within normal limits            Assessments & Plan (with Medical Decision Making)   Nay will be admitted to the hospital due to worsening symptoms, command hallucinations to hurt others and her not being able to control them.  There are no beds available at this time, so she will sleep in the ED until one is open.    I have reviewed the nursing notes.    I have reviewed the findings, diagnosis, plan and need for follow up with the patient.  ADDENDUM:  A bed opened up on station 32 under Dr. Hernandez.      New Prescriptions    No medications on file       Final diagnoses:   Alcohol intoxication, uncomplicated (H)   Depression with anxiety        5/23/2017   [facility], [place], EMERGENCY DEPARTMENT     Manny Singer MD  05/23/17 1806       Manny Singer MD  05/23/17 1811       Manny Singer MD  05/23/17 0778

## 2017-05-23 NOTE — IP AVS SNAPSHOT
[address] [address]    Phone:  [phone]                                       After Visit Summary   5/23/2017    Nay Lainez    MRN: [id number]           After Visit Summary Signature Page     I have received my discharge instructions, and my questions have been answered. I have discussed any challenges I see with this plan with the nurse or doctor.    ..........................................................................................................................................  Patient/Patient Representative Signature      ..........................................................................................................................................  Patient Representative Print Name and Relationship to Patient    ..................................................               ................................................  Date                                            Time    ..........................................................................................................................................  Reviewed by Signature/Title    ...................................................              ..............................................  Date                                                            Time

## 2017-05-24 PROBLEM — R45.850 HOMICIDAL IDEATION: Status: ACTIVE | Noted: 2017-05-24

## 2017-05-24 PROCEDURE — 25000132 ZZH RX MED GY IP 250 OP 250 PS 637: Performed by: STUDENT IN AN ORGANIZED HEALTH CARE EDUCATION/TRAINING PROGRAM

## 2017-05-24 PROCEDURE — 12400001 ZZH R&B MH UMMC

## 2017-05-24 PROCEDURE — 25000132 ZZH RX MED GY IP 250 OP 250 PS 637: Performed by: FAMILY MEDICINE

## 2017-05-24 RX ORDER — FLUTICASONE PROPIONATE 50 MCG
2 SPRAY, SUSPENSION (ML) NASAL AT BEDTIME
Status: DISCONTINUED | OUTPATIENT
Start: 2017-05-24 | End: 2017-05-26 | Stop reason: HOSPADM

## 2017-05-24 RX ORDER — HALOPERIDOL 1 MG/1
1 TABLET ORAL 2 TIMES DAILY
Status: DISCONTINUED | OUTPATIENT
Start: 2017-05-24 | End: 2017-05-26 | Stop reason: HOSPADM

## 2017-05-24 RX ORDER — ACYCLOVIR 400 MG/1
400 TABLET ORAL 2 TIMES DAILY
Status: DISCONTINUED | OUTPATIENT
Start: 2017-05-24 | End: 2017-05-26 | Stop reason: HOSPADM

## 2017-05-24 RX ORDER — CLONAZEPAM 1 MG/1
1 TABLET ORAL
Status: DISCONTINUED | OUTPATIENT
Start: 2017-05-24 | End: 2017-05-26 | Stop reason: HOSPADM

## 2017-05-24 RX ORDER — LORAZEPAM 1 MG/1
1 TABLET ORAL ONCE
Status: COMPLETED | OUTPATIENT
Start: 2017-05-24 | End: 2017-05-24

## 2017-05-24 RX ORDER — HALOPERIDOL 1 MG/1
1 TABLET ORAL ONCE
Status: COMPLETED | OUTPATIENT
Start: 2017-05-24 | End: 2017-05-24

## 2017-05-24 RX ORDER — LORAZEPAM 1 MG/1
1 TABLET ORAL 3 TIMES DAILY
Status: DISCONTINUED | OUTPATIENT
Start: 2017-05-24 | End: 2017-05-26 | Stop reason: HOSPADM

## 2017-05-24 RX ORDER — LORATADINE 10 MG/1
10 TABLET ORAL AT BEDTIME
Status: DISCONTINUED | OUTPATIENT
Start: 2017-05-24 | End: 2017-05-26 | Stop reason: HOSPADM

## 2017-05-24 RX ORDER — EMTRICITABINE AND TENOFOVIR DISOPROXIL FUMARATE 200; 300 MG/1; MG/1
1 TABLET, FILM COATED ORAL DAILY
Status: DISCONTINUED | OUTPATIENT
Start: 2017-05-25 | End: 2017-05-26 | Stop reason: HOSPADM

## 2017-05-24 RX ORDER — HYDROXYZINE HYDROCHLORIDE 25 MG/1
25-50 TABLET, FILM COATED ORAL EVERY 4 HOURS PRN
Status: DISCONTINUED | OUTPATIENT
Start: 2017-05-24 | End: 2017-05-25

## 2017-05-24 RX ORDER — EMTRICITABINE AND TENOFOVIR DISOPROXIL FUMARATE 200; 300 MG/1; MG/1
1 TABLET, FILM COATED ORAL ONCE
Status: COMPLETED | OUTPATIENT
Start: 2017-05-24 | End: 2017-05-24

## 2017-05-24 RX ORDER — OLANZAPINE 10 MG/2ML
10 INJECTION, POWDER, FOR SOLUTION INTRAMUSCULAR
Status: DISCONTINUED | OUTPATIENT
Start: 2017-05-24 | End: 2017-05-26 | Stop reason: HOSPADM

## 2017-05-24 RX ORDER — LANOLIN ALCOHOL/MO/W.PET/CERES
3 CREAM (GRAM) TOPICAL
Status: DISCONTINUED | OUTPATIENT
Start: 2017-05-24 | End: 2017-05-25

## 2017-05-24 RX ORDER — ACETAMINOPHEN 325 MG/1
650 TABLET ORAL EVERY 4 HOURS PRN
Status: CANCELLED | OUTPATIENT
Start: 2017-05-24

## 2017-05-24 RX ORDER — OLANZAPINE 10 MG/1
10 TABLET ORAL
Status: DISCONTINUED | OUTPATIENT
Start: 2017-05-24 | End: 2017-05-26 | Stop reason: HOSPADM

## 2017-05-24 RX ORDER — POLYETHYLENE GLYCOL 3350 17 G
2-4 POWDER IN PACKET (EA) ORAL
Status: DISCONTINUED | OUTPATIENT
Start: 2017-05-24 | End: 2017-05-26 | Stop reason: HOSPADM

## 2017-05-24 RX ORDER — ESTRADIOL 2 MG/1
2 TABLET ORAL
Status: DISCONTINUED | OUTPATIENT
Start: 2017-05-24 | End: 2017-05-26 | Stop reason: HOSPADM

## 2017-05-24 RX ADMIN — HALOPERIDOL 1 MG: 1 TABLET ORAL at 08:32

## 2017-05-24 RX ADMIN — EMTRICITABINE AND TENOFOVIR DISOPROXIL FUMARATE 1 TABLET: 200; 300 TABLET, FILM COATED ORAL at 08:32

## 2017-05-24 RX ADMIN — HYDROXYZINE HYDROCHLORIDE 50 MG: 25 TABLET ORAL at 18:27

## 2017-05-24 RX ADMIN — LORAZEPAM 1 MG: 1 TABLET ORAL at 17:15

## 2017-05-24 RX ADMIN — FLUOXETINE 20 MG: 20 CAPSULE ORAL at 08:32

## 2017-05-24 RX ADMIN — LORAZEPAM 1 MG: 1 TABLET ORAL at 08:32

## 2017-05-24 RX ADMIN — NICOTINE POLACRILEX 4 MG: 2 LOZENGE ORAL at 19:38

## 2017-05-24 RX ADMIN — ESTRADIOL 2 MG: 2 TABLET ORAL at 18:27

## 2017-05-24 ASSESSMENT — ACTIVITIES OF DAILY LIVING (ADL)
DRESS: 0-->INDEPENDENT
DRESS: 0-->INDEPENDENT
RETIRED_EATING: 0-->INDEPENDENT
TRANSFERRING: 0-->INDEPENDENT
TOILETING: 0-->INDEPENDENT
AMBULATION: 0-->INDEPENDENT
SWALLOWING: 0-->SWALLOWS FOODS/LIQUIDS WITHOUT DIFFICULTY
BATHING: 0-->INDEPENDENT
AMBULATION: 0-->INDEPENDENT
COMMUNICATION: 0-->UNDERSTANDS/COMMUNICATES WITHOUT DIFFICULTY
EATING: 0-->INDEPENDENT
BATHING: 0-->INDEPENDENT
TOILETING: 0-->INDEPENDENT
FALL_HISTORY_WITHIN_LAST_SIX_MONTHS: NO
TRANSFERRING: 0-->INDEPENDENT
RETIRED_COMMUNICATION: 0-->UNDERSTANDS/COMMUNICATES WITHOUT DIFFICULTY
COGNITION: 0 - NO COGNITION ISSUES REPORTED
SWALLOWING: 0-->SWALLOWS FOODS/LIQUIDS WITHOUT DIFFICULTY

## 2017-05-24 NOTE — PHARMACY-ADMISSION MEDICATION HISTORY
Admission Medication History status for the 5/23/2017 admission is complete.  See EPIC admission navigator for Prior to Admission medications.    Medication history sources:  Patient (Brought in rx bottles)     Medication history source reliability: Good    Medication adherence:  Good    Changes made to PTA medication list (reason)  Added: Clonazepam (Pt taking)  Deleted: None  Changed: Acyclovir (Dose update)    Additional medication history information (including reliability of information, actions taken by pharmacist):   -Patient provided rx bottles for her medications which greatly aided in her med hx  -Patient reported not receiving a flu shot for the 1241-1901 season  -Patient reported that they are out of their Truvada, would recommend following up with patient to make sure that everything is okay with her HIV pharmacotherapy regimen    Time spent in this activity: 40min    Medication history completed by: Denilson Mae, Pharmacy Intern       Prior to Admission medications    Medication Sig Last Dose Taking? Auth Provider   estradiol (ESTRACE) 1 MG tablet Take 2 mg by mouth 2 times daily  5/23/2017 at morning Yes Reported, Patient   LORAZEPAM PO Take 1 mg by mouth 3 times daily 5/23/2017 at morning Yes Reported, Patient   FLUoxetine (PROZAC) 10 MG capsule Take 20 mg by mouth daily  5/23/2017 at morning Yes Reported, Patient   emtricitabine-tenofovir (TRUVADA) 200-300 MG per tablet Take 1 tablet by mouth daily 5/23/2017 at morning Yes Reported, Patient   ACYCLOVIR PO Take 400 mg by mouth 2 times daily  5/22/2017 at Unknown time Yes Reported, Patient   fluticasone (FLONASE) 50 MCG/ACT nasal spray Spray 2 sprays into both nostrils daily 5/22/2017 at Unknown time Yes Reported, Patient   haloperidol (HALDOL) 1 MG tablet Take 1 mg by mouth 2 times daily  5/23/2017 at morning Yes Reported, Patient   loratadine (CLARITIN) 10 MG tablet Take 10 mg by mouth At Bedtime  5/22/2017 at Unknown time Yes Reported, Patient    clonazePAM (KLONOPIN) 1 MG tablet Take 1 mg by mouth nightly as needed for anxiety Unknown at Unknown time  Unknown, Entered By History

## 2017-05-24 NOTE — ED NOTES
Pt. Report from Poly GUADARRAMA and assume care of pt.  Pt. Is resting in bed with eyes closed at this time.

## 2017-05-24 NOTE — H&P
"    -----------------------------------------------------------------------------------------------------------  Psychiatry History & Physical      Nay Lainez MRN# [id number]   Age: 26 year old YOB: 1990     Date of Admission: 5/23/2017     Interviewed at 3:00 PM             Contacts:   Primary Outpatient Psychiatrist: Leatha Villanueva @ [facility] ([phone])  Primary Physician: Ventura Morelos MD, [facility] ([phone])  Therapist: none         Chief Concern:   \"Hallucinations\"    History is obtained from the patient and electronic health record and EMR         History of Present Illness:   Nay Lainez is a 26 year old male-to-female transgender pt with a significant past psychiatric history of  depression, anxiety and paranoid schizophrenia, ED, AUD , BIB friend d/t HI thoughts to hurt others.  Per ED notes, pt had CAH to kill her mom and \"everyone,\" but pt denies having any plans or intent.  Pt reports she drank just PTA to silence the voices.  Pt endorses baseline of depression and anxiety.  Pt reports that three weeks ago she began experiencing progressively worsening hallucinations.  The AH include command AH and her partner's voice when partner isn't actually speaking.  Visual hallucinations currently include people walking around.  She finds these hallucinations distressing.  Pt states that about 7d ago she ran out of Haldol, missing 3 days of doses, then resuming for 4 days before coming into the hospital.  Pt also experienced medication non-adherence during transition to a new psychiatrist.  Pt reports that she has had Sx like this before, but cannot identify a trigger or provoking factor.  She believes that her medications have never been effective.  Pt denies SI/SIB, endorses HI.    Pt endorses Hx of anorexia nervosa (restriction without binge/purge), anxiety with panic attacks, and depression with psychosis.  She has never received Tx for ED.  " "Pt takes prozac \"for anxiety and panic, maybe it helps a little.\"  Pt reports her EtOH use is a pattern of long periods of sobriety punctuated by relapses.  Pt drank just prior to admission, had been sober 4 months, and had been sober for 2 years prior to that.  She didn't drink d/t cravings, but \"nothing else was helping my hallucinations, and I didn't know what else to try.\"  Pt has taken higher doses of haldol in the past but experienced akathisia.  She is willing to try higher doses and endure akathisia if it makes the hallucinations stop.    Nay Lainez's goals of this hospitalization are \"to stop the hallucinations.\"  She is willing to try a different medication or dosing if recommended.         Psychiatric History:   Past Diagnoses: AUD, ED (AN), antisocial personality d/o, mood d/o NEC with psychotic features, depression, anxiety, paranoid schizophrenia  Past Hospitalizations: 3/9/17 (Dr. Mathur), HI, SI w/ plan to OD on valium.  7/5/16 (Dr. Ronquillo), HI, SI, AUD, mood disorder NEC w/ psychotic features.  Also several at [facility] and [facility].  Prior ECT: none  Prior use of Psychotropic Medication: naltrexone (7/2016), haldol, clonazepam, lorazepam, alprazolam, fluoxetine, gabapentin (for EtOH), quetiapine, bupropion, citalopram  Court Commitment: none  Past Suicide Attempt: none  Self-injurious Behavior: cutting ankles with razor (started as teen; most recently 2/2017)         Substance Use History:   Primary Drug: EtOH  Alcohol: last used just prior to admission. No history of WD seizures or DTs.  Cannabis: no  (uTox (+))  Prior CD Treatment: [facility], 7/2016  Legal Consequences: denies         Psychiatric Review of Systems:   Depression:   Reports: depressed mood, decreased interest, decrease in sleep, poor appetite, helplessness, impaired concentration, decreased energy, irritability.   [place]:   Denies: racing thoughts, pressured speech, grandiose delusions.  Psychosis:   Reports: " visual hallucinations, auditory hallucinations, paranoia, grandiosity  Denies: ideas of reference, thought insertions, thought broadcasting.  Anxiety:   Reports: worries, panic attacks (palpitations, diaphoresis, shortness of breath, sense of impending doom)   PTSD:   Denies: re-experiencing past trauma, nightmares  OCD:   Denies: obsessions, checking, symmetry, cleaning  ADD/ADHD:   Reports: impaired attention, unable to listen, difficulty with organization, difficulty with task completion, forgetful  ED:   Reports: restriction  Denies: binging, purging.  ODD:   Reports: lose temper, argues, defiant, angry.        Medical Review of Systems:   The Review of Systems is negative other than noted in the HPI         Past Medical History   I have reviewed this patient's past medical history  No History of: hepatitis, HIV and seizures         Medications:     I have reviewed this patient's current medications  Prescriptions Prior to Admission   Medication Sig Dispense Refill Last Dose     estradiol (ESTRACE) 1 MG tablet Take 2 mg by mouth 2 times daily    5/23/2017 at PM     LORAZEPAM PO Take 1 mg by mouth 3 times daily   5/24/2017 at 0832     FLUoxetine (PROZAC) 10 MG capsule Take 20 mg by mouth daily    5/24/2017 at 0832     emtricitabine-tenofovir (TRUVADA) 200-300 MG per tablet Take 1 tablet by mouth daily    5/24/2017 at 0832     ACYCLOVIR PO Take 400 mg by mouth 2 times daily    5/23/2017 at PM     fluticasone (FLONASE) 50 MCG/ACT nasal spray Spray 2 sprays into both nostrils daily   5/22/2017 at Unknown time     haloperidol (HALDOL) 1 MG tablet Take 1 mg by mouth 2 times daily    5/24/2017 at 0832     loratadine (CLARITIN) 10 MG tablet Take 10 mg by mouth At Bedtime    5/22/2017 at Unknown time     clonazePAM (KLONOPIN) 1 MG tablet Take 1 mg by mouth nightly as needed for anxiety   Unknown at Unknown time            Allergies:   No Known Allergies        Family History:    Alcohol use d/o it sister, mother, MGF  No  known family MH Hx  Completed/Attempted Suicides in Friends/Family: none        Social History   Upbringing: Born and raised in [place]  Relationships: No contact with family.   to  for 2 years.  Current Living Situation: Lives in apartment in [place] with .  Education: HS grad, some college.  Occupation: .    Legal History: no  Abuse History: no         Labs:   No results found for this or any previous visit (from the past 24 hour(s)).         Psychiatric Examination:   /76  Pulse 69  Temp 99.2  F (37.3  C) (Oral)  Resp 16  Ht 1.829 m (6')  Wt 56.5 kg (124 lb 8 oz)  LMP  (LMP Unknown)  SpO2 97%  BMI 16.89 kg/m2    Appearance:  awake, alert, adequately groomed, cooperative, mild distress, thin and tattoos, piercings  Attitude:  guarded and somewhat cooperative  Eye Contact:  fair  Mood:  sad, overwhelmed, anxious and fearful  Affect:  mood congruent and intensity is blunted  Speech:  clear, coherent and normal prosody  Psychomotor Behavior:  no evidence of tardive dyskinesia, dystonia, or tics and intact station, gait and muscle tone  Thought Process:  logical, linear and goal oriented  Associations:  no loose associations  Thought Content:  auditory hallucinations present, visual hallucinations present and homicidal ideation and paranoia  Insight:  fair  Judgment:  fair  Oriented to:  time, person, and place  Attention Span and Concentration:  intact  Recent and Remote Memory:  fair  Language: communicates coherently in conversational context  Fund of Knowledge: appropriate  Muscle Strength and Tone: normal  Gait and Station: Normal         Physical Examination:   Please refer to note by, Manny Singer MD, dated 5/23/17 for details of physical exam.         Assessment:   Nay Lainez is a 26 year old male-to-female transgender pt with a significant past history of prior psychiatric hospitalizations,  depression, anxiety, paranoid schizophrenia, AUD and ED, who was  BIB a friend to the ED d/t HI to hurt others in the context of 3 weeks progressive CAH and VH, and recent medication non-adherence. The patient's last psychiatric hospitalization was 3/9/17 [facility] with Dr. Mathur.  The patient is currently followed by Leatha Villanueva at [facility]. Family history is notable for EtOH use. Current psychosocial stressors include CAH instructing her to kill, which she has been coping with by drinking EtOH resulting in intoxication and further decompensation. The patient denies other drug abuse or recent self injurious behaviors. The MSE is notable for AH/VH/HI and paranoia. The patient's reported symptoms of psychosis are consistent with historic diagnosis of schizophrenia, paranoid type. Additionally, the patient has traits of AUD which interfere with her ability to adhere with the treatment plan.  PTA medications were continued at time of admission. Given that she is actively psychotic, patient warrants inpatient psychiatric hospitalization to maintain her safety. Disposition pending clinical stabilization, medication optimization and development of an appropriate discharge plan.      Plan   Admit to [facility] under the care of Dr. Abigail Ronquillo MD. To be staffed by Dr. Abigail Ronquillo MD in the .    Principal Diagnosis: schizophrenia  Medications: started PTAs  New: -  Continue:    Lorazepam 1 mg TID    Prozac 20 mg daily    Haldol 1 mg PO BID    Clonazepam 1 mg qhs prn, anxiety  Hold: -    Laboratory/Imaging: BAL 0.212; uTox (+) for EtOH, cannabinoids    Consults: none    Patient will be treated in therapeutic milieu with appropriate individual and group therapies as described.    Secondary psychiatric diagnoses of concern this admission: AUD  Plan: offer CD Tx    Medical diagnoses to be addressed this admission:  none  Consults: none    Relevant psychosocial stressors: CAH/VH; EtOH relapse; recent switch of psychiatrists, and no therapist.    Legal  Status: Voluntary    Safety Assessment:   Checks: Status 15  Precautions: Suicide  Self-harm  Assault  Substance Withdrawal  Pt has not required locked seclusion or restraints in the past 24 hours to maintain safety, please refer to RN documentation for further details.    The risks, benefits, alternatives and side effects have been discussed and are understood by the patient and other caregivers.     Anticipated Disposition/Discharge Date: Unknown at this time. Pending further clinical evaluation and stabilization.    Attestation:  Patient has been seen and evaluated by me,  Cesar Reagan MD Psychiatry Resident, PGY-1.    For attending attestation statement, see progress note dated May 25, 2017. Abigail Ronuqillo MD

## 2017-05-24 NOTE — ED NOTES
Patient signed out at shift change, nurse requesting a.m. meds which were ordered as per her medical record.     Cesar Quinn MD  05/24/17 0734

## 2017-05-24 NOTE — ED NOTES
Handoff report to [facility].  Informed of course of ER stay and current patient condition.  Genoveva verbalized understanding, will call when patient room is available.

## 2017-05-24 NOTE — ED NOTES
Handoff report to [facility].  Informed of course of ED stay and plan of care, opportunity for questions given and Andre verbalized understanding.

## 2017-05-24 NOTE — PROGRESS NOTES
05/24/17 1555   Patient Belongings   Did you bring any home meds/supplements to the hospital?  Yes   Disposition of meds  Sent to security/pharmacy per site process   Patient Belongings body jewelry;keys;shoes;tote;wallet;earings;cell phone/electronics;clothing;glasses   Disposition of Belongings Pt locker   Belongings Search Yes   Clothing Search Yes   Second Staff Meri    General Info Comment Credit card sent to security      Cell phone, Black socks, 2 cardigans, black slippers, t-shirts, black tote, black wallets, drivers license, 2 packs of cigarettes, lighter, keys w/ mace, Credit card ([id number]) Sent to security    ADMISSION:  I am responsible for any personal items that are not sent to the safe or pharmacy. [facility] is not responsible for loss, theft or damage of any property in my possession.    Patient Signature _____________________ Date/Time _____________________    Staff Signature _______________________ Date/Time _____________________    2nd Staff person, if patient is unable/unwilling to sign  ___________________________________ Date/Time _____________________    DISCHARGE:  My personal items have been returned to me.   Patient Signature _____________________ Date/Time _____________________

## 2017-05-24 NOTE — ED NOTES
Patient asleep on cot, responds to voice. Patient reports not sleeping well throughout the night d/t hallucinations--states that she has heard people talking and seen them walking through the department.  Patient denies receiving messages from voices.  Updated on plan of care and verbalized understanding.  Will continue to monitor.

## 2017-05-24 NOTE — ED NOTES
Elvi, Intake updated on patient's course of ER stay, and verbalized understanding.  Elvi will call back with more information after speaking with the accepting physician.  Patient updated.

## 2017-05-25 ENCOUNTER — TELEPHONE (OUTPATIENT)
Dept: BEHAVIORAL HEALTH | Facility: CLINIC | Age: 27
End: 2017-05-25

## 2017-05-25 LAB
ALBUMIN UR-MCNC: NEGATIVE MG/DL
APPEARANCE UR: CLEAR
BILIRUB UR QL STRIP: NEGATIVE
COLOR UR AUTO: ABNORMAL
GLUCOSE UR STRIP-MCNC: NEGATIVE MG/DL
HGB UR QL STRIP: NEGATIVE
KETONES UR STRIP-MCNC: NEGATIVE MG/DL
LEUKOCYTE ESTERASE UR QL STRIP: ABNORMAL
MUCOUS THREADS #/AREA URNS LPF: PRESENT /LPF
NITRATE UR QL: NEGATIVE
PH UR STRIP: 5.5 PH (ref 5–7)
RBC #/AREA URNS AUTO: <1 /HPF (ref 0–2)
SP GR UR STRIP: 1.01 (ref 1–1.03)
SQUAMOUS #/AREA URNS AUTO: 2 /HPF (ref 0–1)
URN SPEC COLLECT METH UR: ABNORMAL
UROBILINOGEN UR STRIP-MCNC: NORMAL MG/DL (ref 0–2)
WBC #/AREA URNS AUTO: 4 /HPF (ref 0–2)

## 2017-05-25 PROCEDURE — 80053 COMPREHEN METABOLIC PANEL: CPT | Performed by: STUDENT IN AN ORGANIZED HEALTH CARE EDUCATION/TRAINING PROGRAM

## 2017-05-25 PROCEDURE — 81001 URINALYSIS AUTO W/SCOPE: CPT | Performed by: STUDENT IN AN ORGANIZED HEALTH CARE EDUCATION/TRAINING PROGRAM

## 2017-05-25 PROCEDURE — 85025 COMPLETE CBC W/AUTO DIFF WBC: CPT | Performed by: STUDENT IN AN ORGANIZED HEALTH CARE EDUCATION/TRAINING PROGRAM

## 2017-05-25 PROCEDURE — 36415 COLL VENOUS BLD VENIPUNCTURE: CPT | Performed by: STUDENT IN AN ORGANIZED HEALTH CARE EDUCATION/TRAINING PROGRAM

## 2017-05-25 PROCEDURE — 25000132 ZZH RX MED GY IP 250 OP 250 PS 637: Performed by: STUDENT IN AN ORGANIZED HEALTH CARE EDUCATION/TRAINING PROGRAM

## 2017-05-25 PROCEDURE — 99232 SBSQ HOSP IP/OBS MODERATE 35: CPT | Mod: GC | Performed by: PSYCHIATRY & NEUROLOGY

## 2017-05-25 PROCEDURE — 12400001 ZZH R&B MH UMMC

## 2017-05-25 RX ORDER — LITHIUM CARBONATE 300 MG/1
300 TABLET, FILM COATED, EXTENDED RELEASE ORAL AT BEDTIME
Status: DISCONTINUED | OUTPATIENT
Start: 2017-05-25 | End: 2017-05-26 | Stop reason: HOSPADM

## 2017-05-25 RX ADMIN — LITHIUM CARBONATE 300 MG: 300 TABLET, EXTENDED RELEASE ORAL at 22:14

## 2017-05-25 RX ADMIN — ESTRADIOL 2 MG: 2 TABLET ORAL at 08:40

## 2017-05-25 RX ADMIN — EMTRICITABINE AND TENOFOVIR DISOPROXIL FUMARATE 1 TABLET: 200; 300 TABLET, FILM COATED ORAL at 08:40

## 2017-05-25 RX ADMIN — LORAZEPAM 1 MG: 1 TABLET ORAL at 08:40

## 2017-05-25 RX ADMIN — CLONAZEPAM 1 MG: 1 TABLET ORAL at 20:30

## 2017-05-25 RX ADMIN — ACYCLOVIR 400 MG: 400 TABLET ORAL at 01:46

## 2017-05-25 RX ADMIN — FLUOXETINE 20 MG: 20 CAPSULE ORAL at 08:40

## 2017-05-25 RX ADMIN — NICOTINE POLACRILEX 4 MG: 2 LOZENGE ORAL at 20:30

## 2017-05-25 RX ADMIN — NICOTINE POLACRILEX 4 MG: 2 LOZENGE ORAL at 13:12

## 2017-05-25 RX ADMIN — NICOTINE POLACRILEX 4 MG: 2 LOZENGE ORAL at 11:02

## 2017-05-25 RX ADMIN — LORATADINE 10 MG: 10 TABLET ORAL at 22:15

## 2017-05-25 RX ADMIN — LORAZEPAM 1 MG: 1 TABLET ORAL at 13:39

## 2017-05-25 RX ADMIN — FLUTICASONE PROPIONATE 2 SPRAY: 50 SPRAY, METERED NASAL at 01:48

## 2017-05-25 RX ADMIN — ACYCLOVIR 400 MG: 400 TABLET ORAL at 08:40

## 2017-05-25 RX ADMIN — ACYCLOVIR 400 MG: 400 TABLET ORAL at 20:29

## 2017-05-25 RX ADMIN — NICOTINE POLACRILEX 4 MG: 2 LOZENGE ORAL at 16:01

## 2017-05-25 RX ADMIN — LORAZEPAM 1 MG: 1 TABLET ORAL at 01:47

## 2017-05-25 RX ADMIN — NICOTINE POLACRILEX 4 MG: 2 LOZENGE ORAL at 18:15

## 2017-05-25 RX ADMIN — NICOTINE POLACRILEX 4 MG: 2 LOZENGE ORAL at 08:40

## 2017-05-25 RX ADMIN — ESTRADIOL 2 MG: 2 TABLET ORAL at 21:00

## 2017-05-25 RX ADMIN — HALOPERIDOL 1 MG: 1 TABLET ORAL at 22:14

## 2017-05-25 RX ADMIN — HYDROXYZINE HYDROCHLORIDE 50 MG: 25 TABLET ORAL at 08:40

## 2017-05-25 RX ADMIN — HALOPERIDOL 1 MG: 1 TABLET ORAL at 01:46

## 2017-05-25 RX ADMIN — HALOPERIDOL 1 MG: 1 TABLET ORAL at 08:42

## 2017-05-25 RX ADMIN — FLUTICASONE PROPIONATE 2 SPRAY: 50 SPRAY, METERED NASAL at 20:29

## 2017-05-25 RX ADMIN — LORATADINE 10 MG: 10 TABLET ORAL at 01:44

## 2017-05-25 ASSESSMENT — ACTIVITIES OF DAILY LIVING (ADL)
ORAL_HYGIENE: INDEPENDENT
DRESS: INDEPENDENT
GROOMING: INDEPENDENT
ORAL_HYGIENE: INDEPENDENT
DRESS: INDEPENDENT
LAUNDRY: WITH SUPERVISION
GROOMING: INDEPENDENT

## 2017-05-25 NOTE — PROGRESS NOTES
05/25/17 1300   Behavioral Health   Hallucinations denies / not responding to hallucinations   Thinking distractable   Orientation person: oriented;place: oriented;date: oriented;time: oriented   Insight insight appropriate to situation   Judgement impaired   Eye Contact at examiner   Affect full range affect   Mood mood is calm   Physical Appearance/Attire attire appropriate to age and situation   Hygiene (adequate)   Suicidality (denies)   Self Injury (denies)   Activity (social, visible in milieu, walking in hallway.  )   Speech clear;coherent   Medication Sensitivity no stated side effects;no observed side effects   Psychomotor / Gait balanced;steady   Psycho Education   Type of Intervention structured groups   Response participates, initiates socially appropriate   Hours 0.5   Treatment Detail Recieving Feedback   Activities of Daily Living   Hygiene/Grooming independent   Oral Hygiene independent   Dress independent   Room Organization independent   Pt was visible in milieu and attended some groups.  Pt denies any medication side effects.  Pt denies SI/SIB.  Pt was social and did lots of walking in the hallway with a few other patients.  Pt stated that she slept like a rock last night, said that she doesn't usually sleep very much at night.

## 2017-05-25 NOTE — PROGRESS NOTES
Initial Psychosocial Assessment    I have reviewed the chart, met with the patient, and developed Care Plan.  Information for assessment was obtained from:     Patient: team interview and Chart: review    Presenting Problem:  patient presented to the hospital ED due to command auditory hallucinations to kill mother/ harm . Per patient felt symptoms of solitario over past several weeks and then drank vodka to try and lesson symptoms. Reports that this did not help but only made things worse. patient reports today that has no desire or intention to harm mom or orthers and these symptoms were frightening which is why presented at the hospital.     History of Mental Health and Chemical Dependency:  Per H&P  Past Diagnoses: AUD, ED (AN), antisocial personality d/o, mood d/o NEC with psychotic features, depression, anxiety, paranoid schizophrenia  Past Hospitalizations: 3/9/17 (Dr. Mathur), HI, SI w/ plan to OD on valium.  7/5/16 (Dr. Ronquillo), HI, SI, AUD, mood disorder NEC w/ psychotic features.  Also several at [facility] and [facility].  Prior ECT: none  Prior use of Psychotropic Medication: naltrexone (7/2016), haldol, clonazepam, lorazepam, alprazolam, fluoxetine, gabapentin (for EtOH), quetiapine, bupropion, citalopram  Court Commitment: none  Past Suicide Attempt: none  Self-injurious Behavior: cutting ankles with razor (started as teen; most recently 2/2017)    Primary Drug: EtOH  Alcohol: last used just prior to admission. No history of WD seizures or DTs.  Cannabis: no  (uTox (+))  Prior CD Treatment: [facility], 7/2016  Legal Consequences: denies      Family Description ([place], Family Psychiatric History):  Alcohol use d/o it sister, mother, MGF  No known family MH Hx  Completed/Attempted Suicides in Friends/Family: none      Significant Life Events (Illness, Abuse, Trauma, Death):       Living Situation:  Lives in apartment in [place] with .      Educational Background:  HS grad, some  "college.      Occupational History:       Financial Status:       Legal Issues:  No known    Ethnic/Cultural Issues:  Transgender     Spiritual Orientation:        Service History:       Social Functioning (organization, interests):       Current Treatment Providers are:  Primary Outpatient Psychiatrist: Leatha Villanueva @ [facility] ([phone])  Primary Physician: Ventura Morelos MD, [facility] ([phone])    patient recently changed psychiatry providers      Social Service Assessment/Plan:   patient presented at the ED due to reported command auditory hallucination and recent alcohol relapse. Upon interview patient denies ongoing AH, reports that has experienced hallucinations since around age 18 and states hallucations happen \"when stressed\" or when manic. Describes self as having solitario weeks prior to recent HA - reports was sleeping little, feeling not needing sleep. patient would like to discharge Friday. patient did ask about having a referral to see a psychiatrist within [facility] psychiatry residency clinic. Per Dr. Ronquillo will inquire , this writer will message intake. patient also expressed interest in partial hospitalization program. This writer discussed that out patient has multiple programs, it is likely 3 day a week or other program may be recommended. patient is agreeable to referral and to learn more about recommended programing. patient is to start lithium at low dose per psychiatry - as is low dose will not require immediate lab after discharge.   "

## 2017-05-25 NOTE — PROGRESS NOTES
----------------------------------------------------------------------------------------------------------  [facility], [place]   Psychiatric Progress Note     Assessment    Presentation: Nay Lainez is a 26 year old male-to-female transgender pt with a significant past history of prior psychiatric hospitalizations, depression, anxiety, paranoid schizophrenia, AUD and ED, who was BIB a friend to the ED d/t HI to hurt others in the context of 3 weeks progressive CAH and VH, and recent medication non-adherence. The patient's last psychiatric hospitalizations were 3/9/2017 for SI, plan to OD on Ativan and 7/2016 for SI and HI, both on [address].     Diagnostic Impression: Current psychosocial stressors include CAH instructing her to kill, and she tried to cope by drinking EtOH, resulting in intoxication and further decompensation. Additionally, 7d PTA she ran out of Haldol, missing 3 days of doses, then resuming 4 days PTA in context of transition to new psychiatrist.     Hospital course: Nay Lainez was admitted to station 22 as a voluntary patient, and lithium 300mg QHS was added to her medication regimen.     Medical course: Nutrition consult (5/25/17) for reduced oral intake over last month.     Plan     Principal Diagnosis: schizophrenia  Continue PTA Medications:   -Lorazepam 1 mg TID  -Prozac 20 mg daily  -Haldol 1 mg PO BID  -Clonazepam 1 mg qhs prn, anxiety    Add:  -Paint 300mg qhs    Laboratory/Imaging: BAL 0.212; uTox (+) for EtOH, cannabinoids  Consults: none  Patient will be treated in therapeutic milieu with appropriate individual and group therapies as described.  Pt will explore PHP, UMP Tx, outpatient programming Tx options.    Secondary psychiatric diagnoses of concern this admission: AUD  Plan: Offer CD Tx    Medical diagnoses to be addressed this admission: mild neutropenia (ANC 1.1, WBC 3.0) incidental finding on CBC  Plan: f/u with PCP as  "outpatient  Consults: none    Relevant psychosocial stressors: CAH/VH; EtOH relapse; recent switch of psychiatrists, and no therapist.    Legal Status: Voluntary    Safety Assessment:   Checks: Status 15  Precautions: Suicide  Self-harm  Assault  Substance Withdrawal  Pt has not required locked seclusion or restraints in the past 24 hours to maintain safety, please refer to RN documentation for further details.     The risks, benefits, alternatives and side effects have been discussed and are understood by the patient and other caregivers.    Anticipated Disposition/Discharge Date: 5/25/17 pending further clinical evaluation and stabilization    Attestation:  Scribed by Flakito Maki, MS3, for Cesar Reagan MD, Psychiatry Resident, PGY-1.    I have reviewed and edited the documentation recorded by the scribe. This documentation accurately reflects the services I personally performed and treatment decisions made by me in consultation with the attending physician.    Cesar Reagan MD, PGY-1 Psychiatry resident  Pager [phone]    Attestation:  This patient has been seen and evaluated by me, Abigail Ronquillo.  I have discussed this patient with the psychiatry resident and I agree with the findings and plan in this note.    I have reviewed today's vital signs, medications, labs and imaging. Abigail Ronquillo MD.         Interim History:   The patient's care was discussed with the treatment team and chart notes were reviewed.     Sleep: 6.5 hours reported  PRNs: hydroxyzine 50 mg  MSSA Score: 2 (5/24/17)    Staff notes: cooperative with admission interview.    The treatment team met with the pt in her room. She reports she is feeling much better and \"slept like a rock last night.\" She denies auditory and visual hallucinations, HI and SI today. The pt clarifies that the previous CAH of killing her mother were not in line with her values, and she never felt action to hurt her mom would occur. The pt states her " "episodes of psychosis happen when she is stressed and \"manic.\" She said during this time, she requires significantly less sleep, has racing thoughts, but is not \"more productive\" and instead feels incapacitated by the excess energy. The team suggests options for mood stabilizer medications. After risks and benefits are discussed, the pt states she would like to start lithium. The pt expresses interest in day treatment program, especially the 10 day intensive option at [facility]. She also expresses interest in an outpatient psychiatrist at [facility].      This author spoke with medical records regarding pt request to change sex from F to M in medical record for billing purposes. Medical records said they will change sex after pt is discharged to avoid problems for obtaining lab results during this hospitalization.         Review of systems:     The Review of Systems is negative other than noted in the HPI         Medications:     Current Facility-Administered Medications   Medication     hydrOXYzine (ATARAX) tablet 25-50 mg     OLANZapine (zyPREXA) tablet 10 mg    Or     OLANZapine (zyPREXA) injection 10 mg     emtricitabine-tenofovir (TRUVADA) 200-300 MG per tablet 1 tablet     estradiol (ESTRACE) tablet 2 mg     FLUoxetine (PROzac) capsule 20 mg     fluticasone (FLONASE) 50 MCG/ACT spray 2 spray     haloperidol (HALDOL) tablet 1 mg     loratadine (CLARITIN) tablet 10 mg     LORazepam (ATIVAN) tablet 1 mg     acyclovir (ZOVIRAX) tablet 400 mg     clonazePAM (klonoPIN) tablet 1 mg     nicotine polacrilex (COMMIT) lozenge 2-4 mg     melatonin tablet 3 mg             Allergies:   No Known Allergies         Psychiatric Examination:   /58  Pulse 59  Temp 97.6  F (36.4  C)  Resp 16  Ht 1.829 m (6')  Wt 58.5 kg (129 lb)  LMP  (LMP Unknown)  SpO2 97%  BMI 17.5 kg/m2  Weight is 129 lbs 0 oz  Body mass index is 17.5 kg/(m^2).    Appearance:  awake, alert, adequately groomed and appeared as age stated, thin, " tattoos, piercings   Attitude:  cooperative  Eye Contact:  good  Mood:  better  Affect:  mood congruent  Speech:  clear, coherent  Psychomotor Behavior:  no evidence of tardive dyskinesia, dystonia, or tics  Thought Process:  logical and linear, pt very engaged in treatment plan  Associations:  no loose associations  Thought Content:  no evidence of suicidal ideation or homicidal ideation  Insight:  good  Judgment:  intact  Oriented to:  time, person, and place  Attention Span and Concentration:  intact  Recent and Remote Memory:  intact  Language: speaks adequate conversational English  Fund of Knowledge: appropriate  Muscle Strength and Tone: normal  Gait and Station: Normal         Labs:     Recent Results (from the past 24 hour(s))   Comprehensive metabolic panel    Collection Time: 05/25/17  7:39 AM   Result Value Ref Range    Sodium 140 133 - 144 mmol/L    Potassium 3.7 3.4 - 5.3 mmol/L    Chloride 105 94 - 109 mmol/L    Carbon Dioxide 28 20 - 32 mmol/L    Anion Gap 7 3 - 14 mmol/L    Glucose 83 70 - 99 mg/dL    Urea Nitrogen 13 7 - 30 mg/dL    Creatinine 0.75 0.52 - 1.04 mg/dL    GFR Estimate >90  Non  GFR Calc   >60 mL/min/1.7m2    GFR Estimate If Black >90   GFR Calc   >60 mL/min/1.7m2    Calcium 8.7 8.5 - 10.1 mg/dL    Bilirubin Total 0.8 0.2 - 1.3 mg/dL    Albumin 3.6 3.4 - 5.0 g/dL    Protein Total 7.0 6.8 - 8.8 g/dL    Alkaline Phosphatase 75 40 - 150 U/L    ALT 25 0 - 50 U/L    AST 19 0 - 45 U/L   CBC with platelets differential    Collection Time: 05/25/17  7:39 AM   Result Value Ref Range    WBC 3.0 (L) 4.0 - 11.0 10e9/L    RBC Count 4.55 3.8 - 5.2 10e12/L    Hemoglobin 14.9 11.7 - 15.7 g/dL    Hematocrit 42.4 35.0 - 47.0 %    MCV 93 78 - 100 fl    MCH 32.7 26.5 - 33.0 pg    MCHC 35.1 31.5 - 36.5 g/dL    RDW 12.0 10.0 - 15.0 %    Platelet Count 161 150 - 450 10e9/L    Diff Method Automated Method     % Neutrophils 37.9 %    % Lymphocytes 50.3 %    % Monocytes 8.1 %     % Eosinophils 3.0 %    % Basophils 0.7 %    % Immature Granulocytes 0.0 %    Nucleated RBCs 0 0 /100    Absolute Neutrophil 1.1 (L) 1.6 - 8.3 10e9/L    Absolute Lymphocytes 1.5 0.8 - 5.3 10e9/L    Absolute Monocytes 0.2 0.0 - 1.3 10e9/L    Absolute Eosinophils 0.1 0.0 - 0.7 10e9/L    Absolute Basophils 0.0 0.0 - 0.2 10e9/L    Abs Immature Granulocytes 0.0 0 - 0.4 10e9/L    Absolute Nucleated RBC 0.0        Antipsychotic Labs:  Recent Labs   Lab Test  03/10/17   0735   CHOL  140   TRIG  108   LDL  76   HDL  42*     Recent Labs   Lab Test  03/10/17   0735  07/06/16   0742   GLC  85  78     Recent Labs   Lab Test  05/25/17   0739  03/10/17   0735  07/06/16   0742   WBC  3.0*  4.9  5.3   ANEU  1.1*  2.5   --    HGB  14.9  16.0*  14.1   PLT  161  183  163       [place] Labs:  No lab results found.  Recent Labs   Lab Test  03/10/17   0735  07/06/16   0742   CR  0.84  0.83   GFRESTIMATED  81  82   BUN  12  11   NA  141  138   POTASSIUM  4.2  3.8   CARRIE  8.4*  8.1*   GLC  85  78     No lab results found.  Recent Labs   Lab Test  03/10/17   0735  07/06/16   0742   TSH  0.56  0.75     Recent Labs   Lab Test  05/25/17   0739  03/10/17   0735  07/06/16   0742   WBC  3.0*  4.9  5.3   ANEU  1.1*  2.5   --    HGB  14.9  16.0*  14.1   PLT  161  183  163     No lab results found.  No lab results found.    Valproate Labs:  No lab results found.  Recent Labs   Lab Test  03/10/17   0735  07/06/16   0742   AST  17  14   ALT  21  21   ALKPHOS  72  46   BILITOTAL  0.5  0.6   PROTTOTAL  7.2  6.6*   ALBUMIN  3.7  3.6     Recent Labs   Lab Test  05/25/17   0739  03/10/17   0735  07/06/16   0742   WBC  3.0*  4.9  5.3   ANEU  1.1*  2.5   --    HGB  14.9  16.0*  14.1   PLT  161  183  163

## 2017-05-25 NOTE — TELEPHONE ENCOUNTER
D: Pt referred to either PHP or Day Tx. Recent auditory command hallucinations. Recent alcohol and cannabis use also noted. Pt has a Hx of CD Tx at [facility] 7/2016.     I:  for Tigist GRANT, to consult. Also sent message to Meli LOPEZ in Day Tx for further review and discussion.    A: Pt is not a candidate for PHP related to A/H. Pt may be a candidate for DDP, however, she currently has Rx for Ativan and Klonopin which are not allowed by the program.     P: Await further consultation to determine placement options and to try to coordinate a DA.    China Oneill, GLENN, [facility] [place]  5/25/2017

## 2017-05-25 NOTE — PROGRESS NOTES
Admission note  Patient admitted from ED. Patient has been experiencing an increase in auditory and visual hallucinations. She was off of her medication for a few days and states they got out of control. She reports having homicidal ideation towards her mother. Pt states the hallucinations had gotten so bad that she relapsed last night on ETOH after being sober for 4 months. Bal was .212.  Patient was in ER for over 20 hours. She was feeling much better by the time she reached station 22. Her thoughts are clear and organized. Her affect full range. She was cooperative with the admission interview.  She feels safe on the unit. Patient received visitors this evening. She has been sleeping at the last part of this shift and is not waking to take her hs meds. Patient will be offered medication when she awakes. Genoveva Arenas RN

## 2017-05-25 NOTE — PROGRESS NOTES
"CLINICAL NUTRITION SERVICES - ASSESSMENT NOTE     Nutrition Prescription    RECOMMENDATIONS FOR MDs/PROVIDERS TO ORDER:  None at this time.     Malnutrition Status:    Patient does not meet two of the above criteria necessary for diagnosing malnutrition but is at risk for malnutrition.     Recommendations already ordered by Registered Dietitian (RD):  None at this time.     Future/Additional Recommendations:  1. Monitor wt trends/PO intake. Encourage meals TID + snacks between meals. If pt consuming 50% or less of meals ordered, encourage nutritional supplements such as Ensure Plus between or with meals.      REASON FOR ASSESSMENT  Nay Lainez is a/an 26 year old female assessed by the dietitian for Admission Nutrition Risk Screen for reduced oral intake over the last month    NUTRITION HISTORY  Per discussion with pt she follows a regular diet at home with no noted restrictions or food allergies. She reports her appetite at baseline is poor and consumes about 1.5 meals per day 2/2 high stress and anxiety which then causes nausea. She reports this has been her usual intake the past 6 months-1 year.     CURRENT NUTRITION ORDERS  Diet: Regular  Intake/Tolerance: Per pt report, she is eating more while in the hospital. She reports she has been consuming small meals, 3 meals/day but no snacks between meals. She anticipates she will eat well as she is able to find menu options she likes. Per review of [employer], pt is ordering adequate meals TID daily of ~2250 kcals and 90 g PRO.     LABS  Labs reviewed    MEDICATIONS  Medications reviewed    ANTHROPOMETRICS  Height: 182.9 cm (6' 0\")  Most Recent Weight: 56.5 kg (124 lb 8 oz)    IBW: 72.7 kg (160#)   BMI: Underweight BMI <18.5  Weight History: Per data below, pt with a ~8% wt loss x 2.5 months. Per pt, she has noticed weight loss of ~10#, stating her UBW is around 130-135#.   Wt Readings from Last 10 Encounters:   05/24/17 56.5 kg (124 lb 8 oz)   03/09/17 61.2 kg " (135 lb)   07/05/16 59.6 kg (131 lb 8 oz)     Dosing Weight: 57 kg - based on admission wt of 56.5 kg on 5/24    ASSESSED NUTRITION NEEDS  Estimated Energy Needs: 2032-9000 kcals/day (35 - 40 kcals/kg)  Justification: Underweight  Estimated Protein Needs: 68-86 grams protein/day (1.2 - 1.5 grams of pro/kg)  Justification: Repletion  Estimated Fluid Needs: 1 mL/kcal   Justification: Maintenance    PHYSICAL FINDINGS  See malnutrition section below.  Appears well-developed and well nourished per ED note 5/23     MALNUTRITION  % Intake: Decreased intake does not meet criteria (poor appetite at baseline)   % Weight Loss: Up to 7.5% in 3 months (non-severe)  Subcutaneous Fat Loss: None observed  Muscle Loss: None observed  Fluid Accumulation/Edema: None noted  Malnutrition Diagnosis: Patient does not meet two of the above criteria necessary for diagnosing malnutrition but is at risk for malnutrition    NUTRITION DIAGNOSIS  Inadequate oral intake related to poor appetite at baseline with anxiety/stress/nausea hindering PO intake as evidenced by 8% wt loss x 2.5 months and current BMI of 17.50 (underweight).      INTERVENTIONS  Implementation  Nutrition Education: Discussed nutrition history and PO since admission. Discussed menu ordering and snacks available on the unit. Discussed oral nutrition supplements and pt not interested at this time. Encouraged adequate PO of food and fluids and encouraged Nay to ask for snacks between meals from staff if hungry.     Goals  Patient to consume % of nutritionally adequate meal trays TID, or the equivalent with supplements/snacks.     Monitoring/Evaluation  Progress toward goals will be monitored and evaluated per protocol.    Talya Hanna, Registration Eligible  Unit Pager: [phone]     I reviewed with above and agree.   Leonarda Mccormick, RD, LD

## 2017-05-26 VITALS
TEMPERATURE: 97 F | DIASTOLIC BLOOD PRESSURE: 58 MMHG | WEIGHT: 129 LBS | SYSTOLIC BLOOD PRESSURE: 117 MMHG | HEART RATE: 59 BPM | BODY MASS INDEX: 17.47 KG/M2 | HEIGHT: 72 IN | OXYGEN SATURATION: 97 % | RESPIRATION RATE: 16 BRPM

## 2017-05-26 PROCEDURE — 25000132 ZZH RX MED GY IP 250 OP 250 PS 637: Performed by: STUDENT IN AN ORGANIZED HEALTH CARE EDUCATION/TRAINING PROGRAM

## 2017-05-26 PROCEDURE — 99238 HOSP IP/OBS DSCHRG MGMT 30/<: CPT | Mod: GC | Performed by: PSYCHIATRY & NEUROLOGY

## 2017-05-26 RX ORDER — LITHIUM CARBONATE 300 MG/1
300 TABLET, FILM COATED, EXTENDED RELEASE ORAL AT BEDTIME
Qty: 30 TABLET | Refills: 0 | Status: ON HOLD | OUTPATIENT
Start: 2017-05-26 | End: 2017-09-25

## 2017-05-26 RX ADMIN — LORAZEPAM 1 MG: 1 TABLET ORAL at 13:46

## 2017-05-26 RX ADMIN — EMTRICITABINE AND TENOFOVIR DISOPROXIL FUMARATE 1 TABLET: 200; 300 TABLET, FILM COATED ORAL at 08:50

## 2017-05-26 RX ADMIN — ESTRADIOL 2 MG: 2 TABLET ORAL at 08:50

## 2017-05-26 RX ADMIN — NICOTINE POLACRILEX 4 MG: 2 LOZENGE ORAL at 08:50

## 2017-05-26 RX ADMIN — FLUOXETINE 20 MG: 20 CAPSULE ORAL at 08:50

## 2017-05-26 RX ADMIN — ACYCLOVIR 400 MG: 400 TABLET ORAL at 08:49

## 2017-05-26 RX ADMIN — LORAZEPAM 1 MG: 1 TABLET ORAL at 08:50

## 2017-05-26 RX ADMIN — NICOTINE POLACRILEX 4 MG: 2 LOZENGE ORAL at 12:28

## 2017-05-26 RX ADMIN — HALOPERIDOL 1 MG: 1 TABLET ORAL at 08:50

## 2017-05-26 RX ADMIN — NICOTINE POLACRILEX 4 MG: 2 LOZENGE ORAL at 13:47

## 2017-05-26 RX ADMIN — NICOTINE POLACRILEX 4 MG: 2 LOZENGE ORAL at 10:45

## 2017-05-26 ASSESSMENT — ACTIVITIES OF DAILY LIVING (ADL)
GROOMING: INDEPENDENT
ORAL_HYGIENE: INDEPENDENT
DRESS: STREET CLOTHES;INDEPENDENT

## 2017-05-26 NOTE — PLAN OF CARE
Problem: General Plan of Care (Inpatient Behavioral)  Goal: Team Discussion  Team Plan:   BEHAVIORAL TEAM DISCUSSION     Continued Stay Criteria/Rationale: patient admitted after presenting with reported AH and recent alcohol use  Plan: Psychiatric assessment. Medication management. Therapeutic Milieu. Individual care planning and after care planning. Patient to participate in unit groups and activities. Individual and group support on unit.   Participants:   Abigail Ronquillo MD, Attending Psychiatrist  Cesar Hernandez MD - PGY-1 Psychiatry Resident  Tigist Cortes MSW, [facility] Clinical Treatment Coordinator  THIERRY Li, MS3  Flakito Maki, MS3  Zainab Mason, PharmD PGY-1 Pharmacy Resident  Summary/Recommendation: see above  Medical/Physical: see H&P  Progress: initial team meeting

## 2017-05-26 NOTE — PROGRESS NOTES
Pt was social with peers and visible in the milieu. Pt denies SI/SIB, hallucinations, medication side affects. Pt stated that she is leaving tomorrow because the weekend makes her worse and crazy, not being able to see the doctors. Pt was upbeat and spent time pacing the halls with a peer.       05/25/17 2046   Behavioral Health   Hallucinations denies / not responding to hallucinations   Thinking distractable   Orientation person: oriented;place: oriented;date: oriented;time: oriented   Memory baseline memory   Insight insight appropriate to situation   Judgement impaired   Eye Contact at examiner   Affect full range affect   Mood mood is calm   Physical Appearance/Attire attire appropriate to age and situation   Hygiene well groomed   Suicidality other (see comments)  (denies)   Self Injury other (see comment)  (denies)   Activity other (see comment)  (visible in milieu )   Speech clear;coherent   Medication Sensitivity no stated side effects;no observed side effects   Psychomotor / Gait balanced;steady   Psycho Education   Type of Intervention 1:1 intervention   Response participates, initiates socially appropriate   Hours 0.5   Treatment Detail Check in    Group Therapy Session   Group Attendance attended group session   Group Type community   Activities of Daily Living   Hygiene/Grooming independent   Oral Hygiene independent   Dress independent   Laundry with supervision   Room Organization independent

## 2017-05-26 NOTE — DISCHARGE INSTRUCTIONS
Behavioral Discharge Planning and Instructions      Summary:  You were admitted on 5/23/2017  For worsenning symptoms including command auditory hallucinations in the context of recent medication non adherence and alchol use.  You were treated by Dr. Abigail Ronquillo MD and Dr. Bernardo Mathur MD and discharged on 5/26/2017 from Station 22.      Health Care Follow-up Appointments:       Tuesday June 6, 2017 10:40am   Psychiatry  Provider: Leatha Trujillo RN, CNS  Address: [facility] [address] [place] [address] Phone: [phone] Fax: [phone]      Tuesday June 6, 2017 1:00pm    Provider:  Meli ELIZABETH - intake assessment Day treatment   [facility] Day [facility] Located in [facility] Floor [address] ; [facility] [address] [place] [address] Phone:[phone]      [facility] ([facility]) - [address] [facility] [facility], [address] phone: [phone]   A referral for potential psychiatry clinic services was made to the intake department at the above clinic. They will contact you at home or you can also call the phone number listed and select the option for intake to inquire about your referral.     Attend all scheduled appointments with your outpatient providers. Call at least 24 hours in advance if you need to reschedule an appointment to ensure continued access to your outpatient providers.   Major Treatments, Procedures and Findings:  You were provided with: a psychiatric assessment, assessed for medical stability, medication evaluation and/or management, group therapy, individual therapy, milieu management and referral for day treatment programing     Symptoms to Report: feeling more aggressive, increased confusion, losing more sleep, mood getting worse or thoughts of suicide    Early warning signs can include: increased depression or  anxiety sleep disturbances increased thoughts or behaviors of suicide or self-harm  increased unusual thinking, such as paranoia or hearing voices    Safety and Wellness:  Take all medicines as directed.  Make no changes unless your doctor suggests them.      Follow treatment recommendations.  Refrain from alcohol and non-prescribed drugs.     Resources:   Crisis Intervention: 366.964.7383 or 459-201-5091 (TTY: [phone]).  Call anytime for help.  National [place] on Mental Illness (www.[place].monse.org): 690.928.2055 or 246-556-7840.  Alcoholics Anonymous (www.alcoholics-anonymous.org): Check your phone book for your local chapter.  Mental Health Association of [place] (www.mentalhealth.org): 719.456.2651 or 404-518-4654  [facility] Crisis (COPE) Response - Adult [phone]    The treatment team has appreciated the opportunity to work with you.     If you have any questions or concerns our unit number is [phone]_

## 2017-05-26 NOTE — PROGRESS NOTES
Patient left with friend jorge came to door to take her home.  All belongings returned no questions.  Bright affect and happy to be going home.

## 2017-05-26 NOTE — PROGRESS NOTES
This author spoke with patient's mother, Ravi Garcia, to fulfill the [facility] duty to war. Pt had homicidal ideation towards mother at time of admission to the hospital. Mother was informed of the pt's command auditory hallucinations towards hurting her mother, and that pt did not desire to hurt her, but per protocol, she needed to be notified. Mother was informed that pt has not had homicidal ideation during her stay on station 22. Told mother the team plans to discharge pt this afternoon.       Scribed by Flakito Maki, MS3, for Cesar Reagan MD, Psychiatry Resident, PGY-1.     I have reviewed and edited the documentation recorded by the scribe. This documentation accurately reflects the services I personally performed and treatment decisions made by me in consultation with the attending physician.    Cesar Reagan MD, PGY-1 Psychiatry resident  Pager [phone]    ATTENDING:  Note reviewed.  Bernardo Mathur MD

## 2017-05-26 NOTE — PLAN OF CARE
"Problem: Psychotic Symptoms  Goal: Psychotic Symptoms  Signs and symptoms of listed problems will be absent or manageable.   Outcome: Adequate for Discharge Date Met:  05/26/17  \"I'm not hallucinating anymore. I'm sleeping like a normal person. I was out of my mind when I came in\". Denies depression, anxiety is minimal. Denies thoughts of wanting to hurt anyone else. Thoughts are more clear and is able to focus and concentrate better. Patient feels thoughts are at baseline. Attends half the groups and is social with peers. Read over discharge instructions with patient and answered questions. Patient's ride will  at 1530.       "

## 2017-05-27 NOTE — DISCHARGE SUMMARY
"    ----------------------------------------------------------------------------------------------------------  [facility], [place]   Discharge Summary      Nay Lainez MRN# [id number]   Age: 26 year old YOB: 1990     Date of Admission:  5/23/2017  Date of Discharge:  5/26/2017  5:27 PM  Admitting Physician:  Abigail Ronquillo MD  Discharge Physician:  Bernardo Mathur MD         Event Leading to Hospitalization:     Nay Lainez is a 26 year old male-to-female transgender pt with a significant past psychiatric history of  depression, anxiety and paranoid schizophrenia, ED, AUD , BIB friend d/t HI thoughts to hurt others.  Per ED notes, pt had CAH to kill her mom and \"everyone,\" but pt denies having any plans or intent.  Pt reports she drank just PTA to silence the voices.  Pt endorses baseline of depression and anxiety.  Pt reports that three weeks ago she began experiencing progressively worsening hallucinations.  The AH include command AH and her partner's voice when partner isn't actually speaking.  Visual hallucinations currently include people walking around.  She finds these hallucinations distressing.  Pt states that about 7d ago she ran out of Haldol, missing 3 days of doses, then resuming for 4 days before coming into the hospital.  Pt also experienced medication non-adherence during transition to a new psychiatrist.  Pt reports that she has had Sx like this before, but cannot identify a trigger or provoking factor.  She believes that her medications have never been effective.  Pt denies SI/SIB, endorses HI.     Pt endorses Hx of anorexia nervosa (restriction without binge/purge), anxiety with panic attacks, and depression with psychosis.  She has never received Tx for ED.  Pt takes prozac \"for anxiety and panic, maybe it helps a little.\"  Pt reports her EtOH use is a pattern of long periods of sobriety punctuated by relapses.  Pt drank just prior to " "admission, had been sober 4 months, and had been sober for 2 years prior to that.  She didn't drink d/t cravings, but \"nothing else was helping my hallucinations, and I didn't know what else to try.\"  Pt has taken higher doses of haldol in the past but experienced akathisia.  She is willing to try higher doses and endure akathisia if it makes the hallucinations stop.     Nay Lainez's goals of this hospitalization are \"to stop the hallucinations.\"  She is willing to try a different medication or dosing if recommended.       See Admission note by Abigail Ronquillo MD on 5/23/17 for additional details.          Diagnoses:     Principal Diagnosis: bipolar I disorder, moderate, most recent epsiode manic, with psychotic features;  Vs. schziophreniform disorder    Secondary psychiatric diagnoses of concern this admission: AUD         Labs:     - BAL 0.212; uTox (+) for EtOH, cannabinoids  - CMP wnl;  CBC wnl except wbc 3.0, ANC 1.1  - UA ne except small LE, WBC 4         Consults:     Nutrition consult requested during this admission         Hospital Course:     Nay Lainez was admitted to Station [facility] with attending No att. providers found as a voluntary patient. The patient was placed under status 15 (15 minute checks) to ensure patient safety. CBC, BMP and utox obtained.  Patient  did not require seclusion or administration of emergency medications to manage behavior.  MSSA was started but soon d/c'ed d/t low scores (2-3).    On admission, outpatient medications were continued.    Presentation of current episode: Thus far this hospitalization, the pt improved with good sleep.  Pt noted that these hallucinations always seemed to occur some time after the onset of a manic episode.  Pt was started on lithium, which she reports had some immediate positive effect.  Pt was counseled on the merits of tapering off of benzodiazpines, and avoiding future cannabis use d/t risk of triggering future episodes.    The patient's " symptoms of hallucinations and CAH improved.    By time of d/c, the pt's hallucinations had completely resolved.    Nay Lainez was discharged to home via her friend Marquise. At the time of discharge Nay Lainez was determined to not be a danger to herself or others.    Today Nay Lainez reports no SI/SIB/HI/hallucinations. In addition, Nay Lainez has notable risk factors for self-harm, including age, psychosis, substance abuse and comorbid substance use. However, risk is mitigated by commitment to family, sobriety, ability to volunteer a safety plan and history of seeking help when needed. Additional steps taken to minimize risk include: Close outpatient follow-up upon d/c, and enrollment in [facility] Day Tx program. Therefore, based on all available evidence including the factors cited above, Nay Lainez does not appear to be at imminent risk for self-harm, and is appropriate for outpatient level of care.     This document serves as a transfer of care to Nay Lainez's outpatient providers.         Discharge Medications:     Pt was discharged with one medication:    Lithium lithobid CR, 300 mg QHS    Pt resumed her PTA medications, which she had sufficient supply:    Psychiatry Medications Dose/Frequency    Lithium (as above)    Lorazepam 1 mg PO TID    Clonazepam 1 mg QHS PRN, anxiety    Prozac 20 mg PO daily    Haldol 1 mg PO BID    Non Psychiatry Medications Dose/Frequency    Loratadine 10 mg PO QHS    Acyclovir 400 mg PO BID    Truvada 200-300 mg, 1 tab PO daily    Fluticasone 50 mcg, 2 sprays into both nostrils daily    Estrace 2 mg PO BID         Psychiatric Examination:     Appearance:  awake, alert, adequately groomed, appeared as age stated, cooperative, no apparent distress, thin and tattoos and body piercings  Attitude:  cooperative  Eye Contact:  good  Mood:  better and good  Affect:  appropriate and in normal range and mood congruent  Speech:  clear, coherent and normal prosody  Psychomotor  Behavior:  no evidence of tardive dyskinesia, dystonia, or tics and intact station, gait and muscle tone  Thought Process:  logical, linear, goal oriented and pt very engaged in Tx plan  Associations:  no loose associations  Thought Content:  no evidence of suicidal ideation or homicidal ideation, no evidence of psychotic thought, no auditory hallucinations present and no visual hallucinations present  Insight:  good  Judgment:  intact  Oriented to:  time, person, and place  Attention Span and Concentration:  intact  Recent and Remote Memory:  intact  Language: Fluent in conversational English  Fund of Knowledge: appropriate  Muscle Strength and Tone: normal  Gait and Station: Normal         Discharge Plan:     Health Care Follow-up Appointments:        Tuesday June 6, 2017 10:40am   Psychiatry  Provider: Leatha Trujlilo RN, CNS  Address: [employer] [address] [place] [address] Phone: [phone] Fax: [phone]       Tuesday June 6, 2017 1:00pm    Provider:  Meli Sparks [facility] - intake assessment Day treatment   [facility] Day [facility] Located in [facility] Floor [address] ; [address] [place] [address] Phone:[phone]       [facility] ([facility]) - [address] [facility], [address] phone: [phone]   A referral for potential psychiatry clinic services was made to the intake department at the above clinic. They will contact you at home or you can also call the phone number listed and select the option for intake to inquire about your referral.     Attestation:    This documentation accurately reflects the services I personally performed and treatment decisions made by me in consultation with the attending physician.    Cesar Reagan MD, PGY-1 Psychiatry resident  Pager [phone]    ATTENDING:  Bernardo VILLANUEVA saw and  evaluated the patient with the resident physician.  I agree with the findings and plan of care as documented in the resident note.  I have reviewed all labs and vital signs.  I, Bernardo Mathur, have reviewed this summary and agree with the findings and discharge plan as written.

## 2017-05-30 LAB
ALBUMIN SERPL-MCNC: 3.6 G/DL (ref 3.4–5)
ALP SERPL-CCNC: 75 U/L (ref 40–150)
ALT SERPL W P-5'-P-CCNC: 25 U/L (ref 0–70)
ANION GAP SERPL CALCULATED.3IONS-SCNC: 7 MMOL/L (ref 3–14)
AST SERPL W P-5'-P-CCNC: 19 U/L (ref 0–45)
BASOPHILS # BLD AUTO: 0 10E9/L (ref 0–0.2)
BASOPHILS NFR BLD AUTO: 0.7 %
BILIRUB SERPL-MCNC: 0.8 MG/DL (ref 0.2–1.3)
BUN SERPL-MCNC: 13 MG/DL (ref 7–30)
CALCIUM SERPL-MCNC: 8.7 MG/DL (ref 8.5–10.1)
CHLORIDE SERPL-SCNC: 105 MMOL/L (ref 94–109)
CO2 SERPL-SCNC: 28 MMOL/L (ref 20–32)
CREAT SERPL-MCNC: 0.75 MG/DL (ref 0.66–1.25)
DIFFERENTIAL METHOD BLD: ABNORMAL
EOSINOPHIL # BLD AUTO: 0.1 10E9/L (ref 0–0.7)
EOSINOPHIL NFR BLD AUTO: 3 %
ERYTHROCYTE [DISTWIDTH] IN BLOOD BY AUTOMATED COUNT: 12 % (ref 10–15)
GFR SERPL CREATININE-BSD FRML MDRD: NORMAL ML/MIN/1.7M2
GLUCOSE SERPL-MCNC: 83 MG/DL (ref 70–99)
HCT VFR BLD AUTO: 42.4 % (ref 40–53)
HGB BLD-MCNC: 14.9 G/DL (ref 13.3–17.7)
IMM GRANULOCYTES # BLD: 0 10E9/L (ref 0–0.4)
IMM GRANULOCYTES NFR BLD: 0 %
LYMPHOCYTES # BLD AUTO: 1.5 10E9/L (ref 0.8–5.3)
LYMPHOCYTES NFR BLD AUTO: 50.3 %
MCH RBC QN AUTO: 32.7 PG (ref 26.5–33)
MCHC RBC AUTO-ENTMCNC: 35.1 G/DL (ref 31.5–36.5)
MCV RBC AUTO: 93 FL (ref 78–100)
MONOCYTES # BLD AUTO: 0.2 10E9/L (ref 0–1.3)
MONOCYTES NFR BLD AUTO: 8.1 %
NEUTROPHILS # BLD AUTO: 1.1 10E9/L (ref 1.6–8.3)
NEUTROPHILS NFR BLD AUTO: 37.9 %
NRBC # BLD AUTO: 0 10*3/UL
NRBC BLD AUTO-RTO: 0 /100
PLATELET # BLD AUTO: 161 10E9/L (ref 150–450)
POTASSIUM SERPL-SCNC: 3.7 MMOL/L (ref 3.4–5.3)
PROT SERPL-MCNC: 7 G/DL (ref 6.8–8.8)
RBC # BLD AUTO: 4.55 10E12/L (ref 4.4–5.9)
SODIUM SERPL-SCNC: 140 MMOL/L (ref 133–144)
WBC # BLD AUTO: 3 10E9/L (ref 4–11)

## 2017-06-06 ENCOUNTER — TELEPHONE (OUTPATIENT)
Dept: BEHAVIORAL HEALTH | Facility: CLINIC | Age: 27
End: 2017-06-06

## 2017-06-06 NOTE — DISCHARGE SUMMARY
"    ----------------------------------------------------------------------------------------------------------  [facility], [place]   Discharge Summary      Nay Lainez MRN# [id number]   Age: 26 year old YOB: 1990     Date of Admission:  5/23/2017  Date of Discharge:  5/26/2017  5:27 PM  Admitting Physician:  Abigail Ronquillo MD  Discharge Physician:  Bernardo Mathur MD         Event Leading to Hospitalization:     Nay Lainez is a 26 year old male-to-female transgender pt with a significant past psychiatric history of  depression, anxiety and paranoid schizophrenia, ED, AUD , BIB friend d/t HI thoughts to hurt others.  Per ED notes, pt had CAH to kill her mom and \"everyone,\" but pt denies having any plans or intent.  Pt reports she drank just PTA to silence the voices.  Pt endorses baseline of depression and anxiety.  Pt reports that three weeks ago she began experiencing progressively worsening hallucinations.  The AH include command AH and her partner's voice when partner isn't actually speaking.  Visual hallucinations currently include people walking around.  She finds these hallucinations distressing.  Pt states that about 7d ago she ran out of Haldol, missing 3 days of doses, then resuming for 4 days before coming into the hospital.  Pt also experienced medication non-adherence during transition to a new psychiatrist.  Pt reports that she has had Sx like this before, but cannot identify a trigger or provoking factor.  She believes that her medications have never been effective.  Pt denies SI/SIB, endorses HI.     Pt endorses Hx of anorexia nervosa (restriction without binge/purge), anxiety with panic attacks, and depression with psychosis.  She has never received Tx for ED.  Pt takes prozac \"for anxiety and panic, maybe it helps a little.\"  Pt reports her EtOH use is a pattern of long periods of sobriety punctuated by relapses.  Pt drank just prior to " "admission, had been sober 4 months, and had been sober for 2 years prior to that.  She didn't drink d/t cravings, but \"nothing else was helping my hallucinations, and I didn't know what else to try.\"  Pt has taken higher doses of haldol in the past but experienced akathisia.  She is willing to try higher doses and endure akathisia if it makes the hallucinations stop.     Nay Lainez's goals of this hospitalization are \"to stop the hallucinations.\"  She is willing to try a different medication or dosing if recommended.       See Admission note by Abigail Ronquillo MD on 5/23/17 for additional details.          Diagnoses:     Principal Diagnosis: schizophrenia vs. Bipolar affective disorder    Secondary psychiatric diagnoses of concern this admission: AUD    Medical diagnoses to be addressed this admission: mild neutropenia          Labs:     - BAL 0.212; uTox (+) for EtOH, cannabinoids  - CMP, CBC wnl except WBC 3.0 (L), ANC 1.1 (L)         Consults:     No consultations were requested during this admission         Hospital Course:     Nay Lainez was admitted to Station 22 with attending Abigail Ronquillo MD as a voluntary patient. The patient was placed under status 15 (15 minute checks) to ensure patient safety. MSSA protocol was initiated due to the patient's history of alcohol abuse and concern for withdrawal symptoms.  CBC, BMP and utox obtained.  Patient  did not require seclusion or administration of emergency medications to manage behavior.    On admission, outpatient medications were continued.    Presentation of current episode: Thus far this hospitalization, pt reported no further AVH, and that her HI thoughts of killing her mother were ego-dystonic thoughts upon which she would never act.  Pt also noted that her psychotic episodes occur in the wake of a manic episode accented by lack of need for sleep, and presence of racing thoughts. Lithium 300 mg PO QHS was added to pt's medication " regimen.    The patient's symptoms of solitario and psychosis improved, and pt endorsed significant improvement of mood and cognition.     Nay Lainez was discharged to home in care of her friend Marquise. At the time of discharge Nay Lainez was determined to not be a danger to herself or others.    Today Nay Lainez reports feeling calmer, reduced anxiety, no SI/HI. In addition, Nay Lainez has notable risk factors for self-harm, including anxiety, psychosis, substance abuse and previous suicide attempts. However, risk is mitigated by commitment to family, ability to volunteer a safety plan and history of seeking help when needed. Additional steps taken to minimize risk include: Close outpatient follow-up including coordination with therapist and psychiatrist. Therefore, based on all available evidence including the factors cited above, Nay Lainez does not appear to be at imminent risk for self-harm, and is appropriate for outpatient level of care.     This document serves as a transfer of care to Nay Lainez's outpatient providers.         Discharge Medications:     Psychiatry Medications Dose/Frequency    Lithium 300 mg PO QHS    Lorazepam 1 mg PO TID    Clonazepam 1 mg PO QHS PRN, anxiety    Prozac 20 mg PO daily    Haldol 1 mg PO BID    Non Psychiatry Medications Dose/Frequency    Loratadine 10 mg PO QHS    Acyclovir 400 mg PO BID    Truvada 200-300 mg tab, 1 tab PO daily, PrEP    Flonase 40 mcg nasal spray, spray 2 sprays into both nostrils daily    Estradiol 2 mg PO BID         Psychiatric Examination:     Appearance:  awake, alert, adequately groomed, appeared as age stated and thin, tattoos, body piercings  Attitude:  cooperative and marilee  Eye Contact:  good  Mood:  better  Affect:  mood congruent and intensity is blunted  Speech:  clear, coherent and normal prosody  Psychomotor Behavior:  no evidence of tardive dyskinesia, dystonia, or tics and intact station, gait and muscle tone  Thought  Process:  logical, linear, goal oriented and pt very involved in Tx plan  Associations:  no loose associations  Thought Content:  no evidence of suicidal ideation or homicidal ideation, no auditory hallucinations present and no visual hallucinations present  Insight:  good  Judgment:  intact  Oriented to:  time, person, and place  Attention Span and Concentration:  intact  Recent and Remote Memory:  intact  Language: Fluent in conversational English  Fund of Knowledge: appropriate  Muscle Strength and Tone: normal  Gait and Station: Normal         Discharge Plan:     Health Care Follow-up Appointments:        Tuesday June 6, 2017 10:40am   Psychiatry  Provider: Leatha Trujillo RN, CNS  Address: [employer] [place] [address] Phone: [phone] Fax: [phone]       Tuesday June 6, 2017 1:00pm    Provider:  Meli ELIZABETH - intake assessment Day treatment   [facility] Day Treatment Located in [facility] Floor [address] ; [facility] [address] [place] [address] Phone:[phone]       [facility] Clinic ([facility]) - [address] [facility], [address] phone: [phone]   A referral for potential psychiatry clinic services was made to the intake department at the above clinic. They will contact you at home or you can also call the phone number listed and select the option for intake to inquire about your referral.      Attestation:    This documentation accurately reflects the services I personally performed and treatment decisions made by me in consultation with the attending physician.    Cesar Reagan MD, PGY-1 Psychiatry resident  Pager [phone]    ATTENDING:  I, Bernardo Mathur, saw and evaluated the patient with the resident physician.  I agree with the findings and plan of care as documented in the resident note.  I have  reviewed all labs and vital signs.  IBernardo, have reviewed this summary and agree with the findings and discharge plan as written.

## 2017-06-06 NOTE — TELEPHONE ENCOUNTER
Sarar called and left a message on Pt's voicemail after she did not attend the DA scheduled for 1:00pm today. Sarar's phone number was left for coordination.

## 2017-06-29 ENCOUNTER — TELEPHONE (OUTPATIENT)
Dept: BEHAVIORAL HEALTH | Facility: CLINIC | Age: 27
End: 2017-06-29

## 2017-06-29 NOTE — TELEPHONE ENCOUNTER
Nay did not attend the scheduled assessment yesterday at 9:00am. This is the second no show for the assessment. If she calls again, will reschedule.

## 2017-08-16 ENCOUNTER — HOSPITAL ENCOUNTER (INPATIENT)
Facility: CLINIC | Age: 27
LOS: 5 days | Discharge: HOME | DRG: 885 | End: 2017-08-21
Attending: FAMILY MEDICINE | Admitting: PSYCHIATRY & NEUROLOGY
Payer: COMMERCIAL

## 2017-08-16 ENCOUNTER — TELEPHONE (OUTPATIENT)
Dept: BEHAVIORAL HEALTH | Facility: CLINIC | Age: 27
End: 2017-08-16

## 2017-08-16 DIAGNOSIS — F22 PARANOID DISORDER (H): ICD-10-CM

## 2017-08-16 LAB
AMPHETAMINES UR QL SCN: NEGATIVE
BARBITURATES UR QL: NEGATIVE
BENZODIAZ UR QL: NEGATIVE
CANNABINOIDS UR QL SCN: POSITIVE
COCAINE UR QL: NEGATIVE
ETHANOL UR QL SCN: NEGATIVE
OPIATES UR QL SCN: NEGATIVE

## 2017-08-16 PROCEDURE — 99284 EMERGENCY DEPT VISIT MOD MDM: CPT | Mod: Z6 | Performed by: FAMILY MEDICINE

## 2017-08-16 PROCEDURE — 90791 PSYCH DIAGNOSTIC EVALUATION: CPT

## 2017-08-16 PROCEDURE — 12400007 ZZH R&B MH INTERMEDIATE UMMC

## 2017-08-16 PROCEDURE — 80320 DRUG SCREEN QUANTALCOHOLS: CPT | Performed by: FAMILY MEDICINE

## 2017-08-16 PROCEDURE — 80307 DRUG TEST PRSMV CHEM ANLYZR: CPT | Performed by: FAMILY MEDICINE

## 2017-08-16 PROCEDURE — 25000132 ZZH RX MED GY IP 250 OP 250 PS 637: Performed by: PSYCHIATRY & NEUROLOGY

## 2017-08-16 PROCEDURE — 99285 EMERGENCY DEPT VISIT HI MDM: CPT | Mod: 25 | Performed by: FAMILY MEDICINE

## 2017-08-16 RX ORDER — OLANZAPINE 10 MG/2ML
10 INJECTION, POWDER, FOR SOLUTION INTRAMUSCULAR
Status: DISCONTINUED | OUTPATIENT
Start: 2017-08-16 | End: 2017-08-21 | Stop reason: HOSPADM

## 2017-08-16 RX ORDER — OLANZAPINE 10 MG/1
10 TABLET ORAL
Status: DISCONTINUED | OUTPATIENT
Start: 2017-08-16 | End: 2017-08-21 | Stop reason: HOSPADM

## 2017-08-16 RX ORDER — ACETAMINOPHEN 325 MG/1
650 TABLET ORAL EVERY 4 HOURS PRN
Status: DISCONTINUED | OUTPATIENT
Start: 2017-08-16 | End: 2017-08-21 | Stop reason: HOSPADM

## 2017-08-16 RX ORDER — ESTRADIOL VALERATE 20 MG/ML
4 INJECTION INTRAMUSCULAR
COMMUNITY
End: 2020-10-27

## 2017-08-16 RX ORDER — POLYETHYLENE GLYCOL 3350 17 G
2-4 POWDER IN PACKET (EA) ORAL
Status: DISCONTINUED | OUTPATIENT
Start: 2017-08-16 | End: 2017-08-21 | Stop reason: HOSPADM

## 2017-08-16 RX ORDER — LITHIUM CARBONATE 300 MG/1
300 TABLET, FILM COATED, EXTENDED RELEASE ORAL AT BEDTIME
Status: DISCONTINUED | OUTPATIENT
Start: 2017-08-16 | End: 2017-08-21 | Stop reason: HOSPADM

## 2017-08-16 RX ORDER — ALUMINA, MAGNESIA, AND SIMETHICONE 2400; 2400; 240 MG/30ML; MG/30ML; MG/30ML
30 SUSPENSION ORAL EVERY 4 HOURS PRN
Status: DISCONTINUED | OUTPATIENT
Start: 2017-08-16 | End: 2017-08-21 | Stop reason: HOSPADM

## 2017-08-16 RX ORDER — EMTRICITABINE AND TENOFOVIR DISOPROXIL FUMARATE 200; 300 MG/1; MG/1
1 TABLET, FILM COATED ORAL DAILY
Status: DISCONTINUED | OUTPATIENT
Start: 2017-08-17 | End: 2017-08-21 | Stop reason: HOSPADM

## 2017-08-16 RX ORDER — ESTRADIOL VALERATE 20 MG/ML
0.1 INJECTION INTRAMUSCULAR
Status: DISCONTINUED | OUTPATIENT
Start: 2017-08-20 | End: 2017-08-16

## 2017-08-16 RX ORDER — HYDROXYZINE HYDROCHLORIDE 25 MG/1
25-50 TABLET, FILM COATED ORAL EVERY 4 HOURS PRN
Status: DISCONTINUED | OUTPATIENT
Start: 2017-08-16 | End: 2017-08-21 | Stop reason: HOSPADM

## 2017-08-16 RX ORDER — LORAZEPAM 1 MG/1
1 TABLET ORAL 4 TIMES DAILY
Status: DISCONTINUED | OUTPATIENT
Start: 2017-08-16 | End: 2017-08-21 | Stop reason: HOSPADM

## 2017-08-16 RX ORDER — THIOTHIXENE 1 MG/1
2 CAPSULE ORAL 3 TIMES DAILY
Status: DISCONTINUED | OUTPATIENT
Start: 2017-08-16 | End: 2017-08-17

## 2017-08-16 RX ORDER — BISACODYL 10 MG
10 SUPPOSITORY, RECTAL RECTAL DAILY PRN
Status: DISCONTINUED | OUTPATIENT
Start: 2017-08-16 | End: 2017-08-21 | Stop reason: HOSPADM

## 2017-08-16 RX ORDER — ACYCLOVIR 400 MG/1
400 TABLET ORAL 2 TIMES DAILY
Status: DISCONTINUED | OUTPATIENT
Start: 2017-08-16 | End: 2017-08-21 | Stop reason: HOSPADM

## 2017-08-16 RX ORDER — TRAZODONE HYDROCHLORIDE 50 MG/1
50 TABLET, FILM COATED ORAL
Status: DISCONTINUED | OUTPATIENT
Start: 2017-08-16 | End: 2017-08-21 | Stop reason: HOSPADM

## 2017-08-16 RX ORDER — LORAZEPAM 1 MG/1
1 TABLET ORAL 4 TIMES DAILY
Status: ON HOLD | COMMUNITY
End: 2017-09-25

## 2017-08-16 RX ORDER — FLUTICASONE PROPIONATE 50 MCG
2 SPRAY, SUSPENSION (ML) NASAL EVERY EVENING
Status: DISCONTINUED | OUTPATIENT
Start: 2017-08-16 | End: 2017-08-21 | Stop reason: HOSPADM

## 2017-08-16 RX ORDER — THIOTHIXENE 2 MG/1
2 CAPSULE ORAL 3 TIMES DAILY
Status: DISCONTINUED | OUTPATIENT
Start: 2017-08-16 | End: 2017-08-16

## 2017-08-16 RX ORDER — CLONAZEPAM 1 MG/1
1 TABLET ORAL AT BEDTIME
Status: DISCONTINUED | OUTPATIENT
Start: 2017-08-16 | End: 2017-08-21 | Stop reason: HOSPADM

## 2017-08-16 RX ORDER — LORATADINE 10 MG/1
10 TABLET ORAL AT BEDTIME
Status: DISCONTINUED | OUTPATIENT
Start: 2017-08-16 | End: 2017-08-21 | Stop reason: HOSPADM

## 2017-08-16 RX ORDER — ESTRADIOL VALERATE 20 MG/ML
2 INJECTION INTRAMUSCULAR
Status: DISCONTINUED | OUTPATIENT
Start: 2017-08-20 | End: 2017-08-21 | Stop reason: HOSPADM

## 2017-08-16 RX ADMIN — NICOTINE POLACRILEX 4 MG: 2 LOZENGE ORAL at 20:44

## 2017-08-16 ASSESSMENT — ACTIVITIES OF DAILY LIVING (ADL)
GROOMING: INDEPENDENT
LAUNDRY: UNABLE TO COMPLETE
ORAL_HYGIENE: INDEPENDENT
DRESS: STREET CLOTHES;SCRUBS (BEHAVIORAL HEALTH);INDEPENDENT

## 2017-08-16 NOTE — IP AVS SNAPSHOT
[address] [address]    Phone:  [phone]                                       After Visit Summary   8/16/2017    Nay Lainez    MRN: [id number]           After Visit Summary Signature Page     I have received my discharge instructions, and my questions have been answered. I have discussed any challenges I see with this plan with the nurse or doctor.    ..........................................................................................................................................  Patient/Patient Representative Signature      ..........................................................................................................................................  Patient Representative Print Name and Relationship to Patient    ..................................................               ................................................  Date                                            Time    ..........................................................................................................................................  Reviewed by Signature/Title    ...................................................              ..............................................  Date                                                            Time

## 2017-08-16 NOTE — ED PROVIDER NOTES
"  History     Chief Complaint   Patient presents with     Homicidal     pt wants to hurt the people that are trying to kill her     Self Injury     cuts all down her legs, neck, and sides of her abdomen      Paranoid     states people are trying to kill her      HPI  Nay Lainez is a 26 year old male who with paranoid ideations. She hears people speaking outside her window in the street and interprets that they are talking about her and are trying to spy and hurt her. This is so strong that she will not leave the apartment- she has not left for the last week. She has a hx of bipolar or schizoaffective disorder. She sees a psych provider at [facility]. The provider yesterday increased navane from 1 mg tid to 2 mg tid. At discharge from [facility] in May 2017 (discharged on haldol) she has been on zyprexa, thorazine and now navane for the paranoid ideations. In addition to the delusional ideations she is wanting \"to kill the people\" that are plotting about her. No one in particular but the \"people outside\". According to the pt and the spouse- these thoughts are stronger than ever before.    The pt is also self scratching her neck, \"I want to see blood\". These are not a suicide attempt.    The pt is a biological male who identifies with the female gender. She is  to a biological female who wants to be identified as a male.     The meds are lithium 300 mg at bedtime- she states not er or xl but \"plain lithium\"  Ativan 1 mg qid  clonopin at hs  And narvane.    The pt reports that she is also on truvada for HIV prophalaxsis. She reports that she and her  have multiple sexual partners. And the truvada is used prophaxsis      I have reviewed the Medications, Allergies, Past Medical and Surgical History, and Social History in the Epic system.  +marijuana reported  Review of Systems   All other systems reviewed and are negative.      Physical Exam   BP: 114/70  Pulse: 87  Temp: 98  F (36.7  C)  Resp: 16  Weight: " 66.1 kg (145 lb 12.8 oz)  SpO2: 98 %  Physical Exam   Constitutional: He is oriented to person, place, and time. No distress.   HENT:   Head: Normocephalic.   Cardiovascular: Normal rate and regular rhythm.    Pulmonary/Chest: No respiratory distress.   Neurological: He is alert and oriented to person, place, and time.   Skin: He is not diaphoretic.   Heavy tatooses  And piercings  Minor superficial scratching on neck   Psychiatric:   Freely speaks of hearing voices speaking about her  Wants to hurt these people   Nursing note and vitals reviewed.      ED Course     ED Course     Procedures        tox pending  Labs Ordered and Resulted from Time of ED Arrival Up to the Time of Departure from the ED - No data to display         Assessments & Plan (with Medical Decision Making)   Will ask for bed. Admit. Pt seems to not functioning - not leaving house.  Many recent changes in antipsychotics    I have reviewed the nursing notes.    I have reviewed the findings, diagnosis, plan and need for follow up with the patient.    New Prescriptions    No medications on file       Final diagnoses:   Paranoid disorder (H)       8/16/2017   [facility], [place], EMERGENCY DEPARTMENT     Hernando Liu MD  08/16/17 [id number]

## 2017-08-16 NOTE — PHARMACY-ADMISSION MEDICATION HISTORY
Admission medication history interview status for the 8/16/2017 admission is complete. See Epic admission navigator for allergy information, pharmacy, prior to admission medications and immunization status.     Medication history interview sources:  Patient, Epic electronic medical record, [facility] ([phone]), family    Changes made to PTA medication list (reason)  Added: Estradiol Valerate  Deleted: estradiol 2 mg po BID, haloperidol 1 mg po BID  Changed: clonazepam (changed to scheduled at HS), Flonase (changed to taking in the evening), lorazepam (frequency increased to QID)    Additional medication history information (including reliability of information, actions taken by pharmacist): Patient was a reliable historian and able to discuss medications without difficulty. She reported switching from estradiol tablets to estradiol valerate injections but was not clear about her dose. This writer called the outpatient [facility] pharmacist to clarify some medication information.     Estradiol valorate 20 mg/mL last filled on 06/10/17 for one month supply  Lorazepam 1 mg last filled on 08/11/17 for one month supply  Lithium 300 mg las filled on 07/31/17 for one month supply  Clonazepam 1 mg last filled on 07/31/17 for one month supply  Truvada last filled on 07/24/14 for one month supply  Fluoxetine 20 mg last filled on 07/24/17 for one month supply  Acyclovir 400 mg last filled on 06/19/17 for one month supply.    The patient reported thiotixene is a newer medication and was recently increased from 1 mg to 2 mg po TID.      Prior to Admission medications    Medication Sig Last Dose Taking? Auth Provider   Thiothixene (NAVANE PO) Take 2 mg by mouth 3 times daily 8/16/2017 at AM Yes Reported, Patient   LORazepam (ATIVAN) 1 MG tablet Take 1 mg by mouth 4 times daily 8/16/2017 at AM Yes Unknown, Entered By History   estradiol valerate (DELESTROGEN) 20 MG/ML injection Inject 0.1 mL (2 mg) into the muscle  every 7 days Takes on Sundays 8/13/2017 Yes Unknown, Entered By History   lithium (ESKALITH/LITHOBID) 300 MG CR tablet Take 1 tablet (300 mg) by mouth At Bedtime 8/15/2017 at PM Yes Bernardo Mathur MD   clonazePAM (KLONOPIN) 1 MG tablet Take 1 mg by mouth At Bedtime  8/15/2017 at PM Yes Unknown, Entered By History   FLUoxetine (PROZAC) 10 MG capsule Take 20 mg by mouth daily  8/16/2017 at AM Yes Reported, Patient   emtricitabine-tenofovir (TRUVADA) 200-300 MG per tablet Take 1 tablet by mouth daily  8/16/2017 at AM Yes Reported, Patient   ACYCLOVIR PO Take 400 mg by mouth 2 times daily  8/15/2017 at PM Yes Reported, Patient   fluticasone (FLONASE) 50 MCG/ACT nasal spray Spray 2 sprays into both nostrils every evening  8/15/2017 at PM Yes Reported, Patient   loratadine (CLARITIN) 10 MG tablet Take 10 mg by mouth At Bedtime   Yes Reported, Patient         Medication history completed by: Ophelia Baum, PharmD, BCPP

## 2017-08-16 NOTE — IP AVS SNAPSHOT
MRN:[id number]                      After Visit Summary   8/16/2017    Nay Lainez    MRN: [id number]           Thank you!     Thank you for choosing [facility] for your care. Our goal is always to provide you with excellent care.        Patient Information     Date Of Birth          1990        Designated Caregiver       Most Recent Value    Caregiver    Will someone help with your care after discharge? no      About your hospital stay     You were admitted on:  August 16, 2017 You last received care in the:  [facility]    You were discharged on:  August 21, 2017       Who to Call     For medical emergencies, please call 911.  For non-urgent questions about your medical care, please call your primary care provider or clinic, [phone]          Attending Provider     Provider Specialty    Hernando Liu MD Emergency Medicine    Keysha, Dread LOPEZ MD Psychiatry       Primary Care Provider Office Phone # Fax #    Ventura Morelos [phone]      Further instructions from your care team       Behavioral Discharge Planning and Instructions          Summary:  You were admitted on 8/16/2017 for psychosis. Patient reported hearing voices in the hallway and believed the people were trying to kill her. Patient now denies any thoughts of self harm or harm to others.   You were treated by Dr. Dread Chopra MD and discharged on  from [facility]      Main Diagnosis: Major Depressive Disorder, Generalized Anxiety Disorder      Health Care Follow-up Appointments:   [facility]  [phone]  [address] [place] [address]  Leatha Trujillo Wednesday September 20th @ 3:20      Patient declined referral for psychotherapy, day treatment or other mental health follow up.           Attend all scheduled appointments with your outpatient providers. Call at least 24 hours in advance if you need to reschedule an appointment to ensure continued access to your outpatient providers.  "  Major Treatments, Procedures and Findings:  You were provided with: a psychiatric assessment and medication evaluation and/or management    Symptoms to Report: feeling more aggressive, increased confusion, losing more sleep, mood getting worse or thoughts of suicide    Early warning signs can include: increased depression or anxiety sleep disturbances increased thoughts or behaviors of suicide or self-harm  increased unusual thinking, such as paranoia or hearing voices    Safety and Wellness:  Take all medicines as directed.  Make no changes unless your doctor suggests them.      Follow treatment recommendations.  Refrain from alcohol and non-prescribed drugs.  If there is a concern for safety, call 911.    Resources:   Crisis Intervention: 965.990.6313 or 260-761-4504 (TTY: 460.179.5377).  Call anytime for help.  [employer] [place] [employer] Mental Illness (www.[place].monse.org): 924.770.7090 or 468-949-6529.  Mental Health Association of [place] (www.mentalhealth.org): 316.378.1120 or 740-872-4436    The treatment team has appreciated the opportunity to work with you.     If you have any questions or concerns our unit number is [phone].  You may be receiving a follow-up phone call within the next three days from a representative from behavioral health.          Pending Results     No orders found from 8/14/2017 to 8/17/2017.            Admission Information     Date & Time Provider Department Dept. Phone    8/16/2017 Dread Chopra MD [facility] [phone]      Your Vitals Were     Blood Pressure Pulse Temperature Respirations Height Weight    93/71 83 97.5  F (36.4  C) (Oral) 16 1.829 m (6') 66.7 kg (147 lb)    Pulse Oximetry BMI (Body Mass Index)                100% 19.94 kg/m2          [employer] Information     [facility] lets you send messages to your doctor, view your test results, renew your prescriptions, schedule appointments and more. To sign up, go to [email or web address] . Click on \"Log in\" on the left side " "of the screen, which will take you to the Welcome page. Then click on \"Sign up Now\" on the right side of the page.     You will be asked to enter the access code listed below, as well as some personal information. Please follow the directions to create your username and password.     Your access code is: [email or web address]  Expires: 2017 10:37 AM     Your access code will  in 90 days. If you need help or a new code, please call your [place] clinic or [phone].        Care EveryWhere ID     This is your Care EveryWhere ID. This could be used by other organizations to access your [place] medical records  [id number]        Equal Access to Services     ELIESER CASILLAS : Kristi Stewart, doroteo mccabe, shahzad beck, denver winkler. So [facility] [phone].    ATENCIÓN: Si habla español, tiene a knight disposición servicios gratuitos de asistencia lingüística. Llame al [phone].    We comply with applicable federal civil rights laws and [place] laws. We do not discriminate on the basis of race, color, national origin, age, disability sex, sexual orientation or gender identity.               Review of your medicines      UNREVIEWED medicines. Ask your doctor about these medicines        Dose / Directions    ACYCLOVIR PO        Dose:  400 mg   Take 400 mg by mouth 2 times daily   Refills:  0       clonazePAM 1 MG tablet   Commonly known as:  klonoPIN        Dose:  1 mg   Take 1 mg by mouth At Bedtime   Refills:  0       estradiol valerate 20 MG/ML injection   Commonly known as:  DELESTROGEN        Dose:  2 mg   Inject 2 mg into the muscle every 7 days Takes on Sundays   Refills:  0       FLUoxetine 10 MG capsule   Commonly known as:  PROzac        Dose:  20 mg   Take 20 mg by mouth daily   Refills:  0       fluticasone 50 MCG/ACT spray   Commonly known as:  FLONASE        Dose:  2 spray   Spray 2 sprays into both nostrils every evening   Refills:  0       " lithium 300 MG CR tablet   Commonly known as:  ESKALITH/LITHOBID   Used for:  Schizoaffective disorder, bipolar type (H)        Dose:  300 mg   Take 1 tablet (300 mg) by mouth At Bedtime   Quantity:  30 tablet   Refills:  0       loratadine 10 MG tablet   Commonly known as:  CLARITIN        Dose:  10 mg   Take 10 mg by mouth At Bedtime   Refills:  0       LORazepam 1 MG tablet   Commonly known as:  ATIVAN        Dose:  1 mg   Take 1 mg by mouth 4 times daily   Refills:  0       NAVANE PO        Dose:  2 mg   Take 2 mg by mouth 3 times daily   Refills:  0       TRUVADA 200-300 MG per tablet   Indication:  PrEP   Generic drug:  emtricitabine-tenofovir        Dose:  1 tablet   Take 1 tablet by mouth daily   Refills:  0                Protect others around you: Learn how to safely use, store and throw away your medicines at www.disposemymeds.org.             Medication List: This is a list of all your medications and when to take them. Check marks below indicate your daily home schedule. Keep this list as a reference.      Medications           Morning Afternoon Evening Bedtime As Needed    ACYCLOVIR PO   Take 400 mg by mouth 2 times daily   Last time this was given:  400 mg on 8/21/2017  8:12 AM                                clonazePAM 1 MG tablet   Commonly known as:  klonoPIN   Take 1 mg by mouth At Bedtime   Last time this was given:  1 mg on 8/20/2017  8:16 PM                                estradiol valerate 20 MG/ML injection   Commonly known as:  DELESTROGEN   Inject 2 mg into the muscle every 7 days Takes on Sundays   Last time this was given:  2 mg on 8/20/2017  4:26 PM                                FLUoxetine 10 MG capsule   Commonly known as:  PROzac   Take 20 mg by mouth daily   Last time this was given:  20 mg on 8/21/2017  8:12 AM                                fluticasone 50 MCG/ACT spray   Commonly known as:  FLONASE   Spray 2 sprays into both nostrils every evening   Last time this was given:  2  sprays on 8/20/2017  8:12 PM                                lithium 300 MG CR tablet   Commonly known as:  ESKALITH/LITHOBID   Take 1 tablet (300 mg) by mouth At Bedtime   Last time this was given:  300 mg on 8/20/2017  8:16 PM                                loratadine 10 MG tablet   Commonly known as:  CLARITIN   Take 10 mg by mouth At Bedtime   Last time this was given:  10 mg on 8/20/2017  8:16 PM                                LORazepam 1 MG tablet   Commonly known as:  ATIVAN   Take 1 mg by mouth 4 times daily   Last time this was given:  1 mg on 8/21/2017  8:12 AM                                NAVANE PO   Take 2 mg by mouth 3 times daily   Last time this was given:  4 mg on 8/21/2017  8:12 AM                                TRUVADA 200-300 MG per tablet   Take 1 tablet by mouth daily   Last time this was given:  1 tablet on 8/21/2017  8:12 AM   Generic drug:  emtricitabine-tenofovir

## 2017-08-16 NOTE — TELEPHONE ENCOUNTER
"S: Dr Liu bib her family to er due to SIB's and increased paranoia and delusions.  B: Bio male who identifies as a female. No surgery. She is  to a bio female who identifies as a male. Dx bipolar, schizoaffective type. She takes ativan, lithium, klonopin and novane. The dose was increased yest on the novane. She was last admitted here in May with similar symptoms. She is scratching her neck. Hx of SIB's. In the past few weeks she has had increased paranoia and delusional thoughts  She says she hears people talking \"street talk\" about her and says she thinks they are planning to \"get her\". The voices are intrusive so she has not left her home in a week. She reports thoughts of wanting to kill these people before they \"get her\". She has had \"on and off\" paranoia and delusions for years. Pt admits to using marij. Utox pending. She is on truvada, an HIV drug. She does not have HIV but she says that she and her  have multiple sex partners so use it for a preventative measure. She became upset when asked if she has HIV. No chronic med prob's.  A: withdrawn, irritable, coop, vol, med cleared, denies SI, paranoid and delusional and \"potentially homicidal\"   R: #20/erica accepted for himself             #20 informed at 2:42 pm, er informed at 2:42 pm             mblatanya  "

## 2017-08-16 NOTE — PROGRESS NOTES
"   08/16/17 1810   Patient Belongings   Did you bring any home meds/supplements to the hospital?  Yes   Disposition of meds  Sent to security/pharmacy per site process   Patient Belongings body jewelry;bracelet;cell phone/electronics;clothing;keys;jewelry;glasses   Disposition of Belongings In pt locker, with pt, sent to security   Belongings Search Yes   Clothing Search Yes   Second Staff Boaz NUGENT       ITEMS IN PT LOCKER  3 black shirts  1 pair black pants  1 duffel bag  1 black coat  1 black skirt  1 pair black jeans  2 black tank top shirts  4 pair underwear  1 pair socks  3 black hats  Key ring w/6 keys  1 pair white ear buds  2 cigarette cases w/2 cigarettes  Assorted makeup products  1 compact case w/2 mirrors  1 small \"Moe Kors\" accessory bag  1 lighter  2 Insurance cards  2 ID cards  11 stamps  Loose change  1 long black cardigan  2 silver-colored bracelets  1 black iPhone w/ \"Magpul\" case  1 black purse  1 black wallet  1 white cell phone   1 pair sunglasses w/case  1 pair glasses w/case    ITEMS ON PT  Facial jewelry and earrings  Glasses    ITEMS SENT TO SECURITY    Envelope #[id number] (Medications)  Envelope #[id number] (Visa #[id number])  Envelope Item Number [id number]    A               Admission:  I am responsible for any personal items that are not sent to the safe or pharmacy.  [facility] is not responsible for loss, theft or damage of any property in my possession.    Signature:  _________________________________ Date: _______  Time: _____                                              Staff Signature:  ____________________________ Date: ________  Time: _____      2nd Staff person, if patient is unable/unwilling to sign:    Signature: ________________________________ Date: ________  Time: _____     Discharge:  Juan José has returned all of my personal belongings:    Signature: _________________________________ Date: ________  Time: _____                                          Staff Signature:  " ____________________________ Date: ________  Time: _____

## 2017-08-17 LAB
ALBUMIN SERPL-MCNC: 3.6 G/DL (ref 3.4–5)
ALP SERPL-CCNC: 102 U/L (ref 40–150)
ALT SERPL W P-5'-P-CCNC: 23 U/L (ref 0–70)
ANION GAP SERPL CALCULATED.3IONS-SCNC: 11 MMOL/L (ref 3–14)
AST SERPL W P-5'-P-CCNC: 15 U/L (ref 0–45)
BASOPHILS # BLD AUTO: 0 10E9/L (ref 0–0.2)
BASOPHILS NFR BLD AUTO: 0.6 %
BILIRUB SERPL-MCNC: 0.5 MG/DL (ref 0.2–1.3)
BUN SERPL-MCNC: 9 MG/DL (ref 7–30)
CALCIUM SERPL-MCNC: 8.5 MG/DL (ref 8.5–10.1)
CHLORIDE SERPL-SCNC: 109 MMOL/L (ref 94–109)
CHOLEST SERPL-MCNC: 146 MG/DL
CO2 SERPL-SCNC: 22 MMOL/L (ref 20–32)
CREAT SERPL-MCNC: 0.73 MG/DL (ref 0.66–1.25)
DIFFERENTIAL METHOD BLD: ABNORMAL
EOSINOPHIL # BLD AUTO: 0.3 10E9/L (ref 0–0.7)
EOSINOPHIL NFR BLD AUTO: 6.4 %
ERYTHROCYTE [DISTWIDTH] IN BLOOD BY AUTOMATED COUNT: 12.3 % (ref 10–15)
GFR SERPL CREATININE-BSD FRML MDRD: >90 ML/MIN/1.7M2
GLUCOSE SERPL-MCNC: 89 MG/DL (ref 70–99)
HCT VFR BLD AUTO: 41.5 % (ref 40–53)
HDLC SERPL-MCNC: 43 MG/DL
HGB BLD-MCNC: 14.8 G/DL (ref 13.3–17.7)
IMM GRANULOCYTES # BLD: 0 10E9/L (ref 0–0.4)
IMM GRANULOCYTES NFR BLD: 0 %
LDLC SERPL CALC-MCNC: 87 MG/DL
LYMPHOCYTES # BLD AUTO: 1.5 10E9/L (ref 0.8–5.3)
LYMPHOCYTES NFR BLD AUTO: 27.5 %
MCH RBC QN AUTO: 33.3 PG (ref 26.5–33)
MCHC RBC AUTO-ENTMCNC: 35.7 G/DL (ref 31.5–36.5)
MCV RBC AUTO: 93 FL (ref 78–100)
MONOCYTES # BLD AUTO: 0.4 10E9/L (ref 0–1.3)
MONOCYTES NFR BLD AUTO: 7.1 %
NEUTROPHILS # BLD AUTO: 3.1 10E9/L (ref 1.6–8.3)
NEUTROPHILS NFR BLD AUTO: 58.4 %
NONHDLC SERPL-MCNC: 103 MG/DL
NRBC # BLD AUTO: 0 10*3/UL
NRBC BLD AUTO-RTO: 0 /100
PLATELET # BLD AUTO: 191 10E9/L (ref 150–450)
POTASSIUM SERPL-SCNC: 4.1 MMOL/L (ref 3.4–5.3)
PROT SERPL-MCNC: 6.7 G/DL (ref 6.8–8.8)
RBC # BLD AUTO: 4.45 10E12/L (ref 4.4–5.9)
SODIUM SERPL-SCNC: 142 MMOL/L (ref 133–144)
TRIGL SERPL-MCNC: 80 MG/DL
TSH SERPL DL<=0.005 MIU/L-ACNC: 0.57 MU/L (ref 0.4–4)
WBC # BLD AUTO: 5.4 10E9/L (ref 4–11)

## 2017-08-17 PROCEDURE — 80061 LIPID PANEL: CPT | Performed by: PSYCHIATRY & NEUROLOGY

## 2017-08-17 PROCEDURE — 25000132 ZZH RX MED GY IP 250 OP 250 PS 637: Performed by: PSYCHIATRY & NEUROLOGY

## 2017-08-17 PROCEDURE — 99223 1ST HOSP IP/OBS HIGH 75: CPT | Mod: AI | Performed by: PSYCHIATRY & NEUROLOGY

## 2017-08-17 PROCEDURE — 36415 COLL VENOUS BLD VENIPUNCTURE: CPT | Performed by: PSYCHIATRY & NEUROLOGY

## 2017-08-17 PROCEDURE — 85025 COMPLETE CBC W/AUTO DIFF WBC: CPT | Performed by: PSYCHIATRY & NEUROLOGY

## 2017-08-17 PROCEDURE — 80053 COMPREHEN METABOLIC PANEL: CPT | Performed by: PSYCHIATRY & NEUROLOGY

## 2017-08-17 PROCEDURE — 84443 ASSAY THYROID STIM HORMONE: CPT | Performed by: PSYCHIATRY & NEUROLOGY

## 2017-08-17 PROCEDURE — 12400007 ZZH R&B MH INTERMEDIATE UMMC

## 2017-08-17 RX ORDER — THIOTHIXENE 1 MG/1
4 CAPSULE ORAL 2 TIMES DAILY
Status: DISCONTINUED | OUTPATIENT
Start: 2017-08-17 | End: 2017-08-21 | Stop reason: HOSPADM

## 2017-08-17 RX ADMIN — THIOTHIXENE 4 MG: 1 CAPSULE ORAL at 21:07

## 2017-08-17 RX ADMIN — LITHIUM CARBONATE 300 MG: 300 TABLET, EXTENDED RELEASE ORAL at 20:36

## 2017-08-17 RX ADMIN — CLONAZEPAM 1 MG: 1 TABLET ORAL at 20:35

## 2017-08-17 RX ADMIN — FLUOXETINE 20 MG: 20 CAPSULE ORAL at 09:09

## 2017-08-17 RX ADMIN — NICOTINE POLACRILEX 4 MG: 2 LOZENGE ORAL at 09:12

## 2017-08-17 RX ADMIN — NICOTINE POLACRILEX 4 MG: 2 LOZENGE ORAL at 18:36

## 2017-08-17 RX ADMIN — NICOTINE POLACRILEX 4 MG: 2 LOZENGE ORAL at 14:16

## 2017-08-17 RX ADMIN — LORAZEPAM 1 MG: 1 TABLET ORAL at 09:09

## 2017-08-17 RX ADMIN — EMTRICITABINE AND TENOFOVIR DISOPROXIL FUMARATE 1 TABLET: 200; 300 TABLET, FILM COATED ORAL at 09:09

## 2017-08-17 RX ADMIN — NICOTINE POLACRILEX 4 MG: 2 LOZENGE ORAL at 20:35

## 2017-08-17 RX ADMIN — ACYCLOVIR 400 MG: 400 TABLET ORAL at 20:35

## 2017-08-17 RX ADMIN — LORATADINE 10 MG: 10 TABLET ORAL at 20:35

## 2017-08-17 RX ADMIN — FLUTICASONE PROPIONATE 2 SPRAY: 50 SPRAY, METERED NASAL at 20:37

## 2017-08-17 RX ADMIN — ACYCLOVIR 400 MG: 400 TABLET ORAL at 09:09

## 2017-08-17 ASSESSMENT — ACTIVITIES OF DAILY LIVING (ADL)
DRESS: INDEPENDENT
LAUNDRY: UNABLE TO COMPLETE
ORAL_HYGIENE: INDEPENDENT
ORAL_HYGIENE: INDEPENDENT
GROOMING: INDEPENDENT
DRESS: STREET CLOTHES
GROOMING: INDEPENDENT

## 2017-08-17 NOTE — PROGRESS NOTES
"Nay asked for a hat.  She was told hats weren't allowed on the unit.  She said she didn't have any hair and her head was cold.  Explained to her that she could cover her head with a blanket.  She walked away saying she \"hates this place\", she then angrily and forcefully tipped over the linen hamper.  "

## 2017-08-17 NOTE — PLAN OF CARE
Problem: General Plan of Care (Inpatient Behavioral)  Goal: Individualization/Patient Specific Goal (IP Behavioral)  The patient and/or their representative will achieve their patient-specific goals related to the plan of care.    The patient-specific goals include:   Patient's Goals:  Feel better- medications adjusted     Plan for admission:  1. Stabilization of mood disorder symptoms  2. Absence of SI- safe with self  3. Medication management per MD's  4. Coordination of care with outpatient providers, family  5. Psychiatric follow up care in place

## 2017-08-17 NOTE — PROGRESS NOTES
"INITIAL PSYCHOSOCIAL ASSESSMENT   I have reviewed the chart met with the patient, and developed Care Plan.  Information for assessment was obtained from: Patient/patient chart    Presenting Problem:   Patient is a 26 year old male to female transgender with a history of Schizoaffective Disorder who presented to the ED  with paranoid ideations. She reports hearing people speaking outside herapartment window in the street and interprets that they are talking about her and are trying to spy and hurt her. This is so upsetting  that she will not leave the apartment-  has not left for the last week.   In addition to the delusional ideations, she reports wanting \"to kill the people\" that are plotting about her. No one in particular but the \"people outside\". According to the patient and her spouse- these thoughts are stronger than ever before.  The patient reports she has been scratching her neck, \" to see blood\".  She reports that she has been medication compliant and saw her outpatient provider the day prior to admission.     The following areas have been assessed:  History of Mental Health and Chemical Dependency:  Patient has an extensive psychiatric history with multiple past admissions at [facility] and [facility]- most recently 3/2017 and 5/2017 Patient denies any h/o suicide attempts.  As noted, has been engaging in SIB- scratching self. Per ED report, patient has scratches all over legs and abdomen     Patient has a h/o alcohol dependence. Had been sober since July 2016. CD treatment briefly- 1 week at [facility] in July 2016.     Family Description (Constellation, Family Psychiatric History):   Patient was born/raised in [place] Patient reports being estranged from family     Patient is  x 2 years     Family history is significant for alcohol dependence in mother, sister and MGF     Significant Life Events (Illness, Abuse, Trauma, Death):  Patient is transgender and on hormone therapy.  Patient denies any h/o " physical/sexual abuse     Living Situation:    Patient lives in [facility] with       Educational Background:   HS graduate- some college coursework     Occupational History:   Patient is unemployed. Last worked in the Fall 2016.  Has worked retail, , [employer]     Financial Status:    No immediate concerns      Legal Issues:    Patient denies     Ethnic/Cultural Issues:  No concerns identified     Spiritual Orientation:    None       Service History:   N/A     Social Functioning (organization, interests):  Spends days hanging out with neighbors. Patient reports anxiety has interfered with social functioning     Current Treatment Providers are:  Psychiatrist:Leatha Villanueva :[facility]  ([phone])  Therapist: Ni Purcell     Social Service Assessment/Plan:  Patient will have ongoing psychiatric assessment.  Medications will be reviewed and adjusted per MD as indicated. Outpatient providers will be contacted for care coordination.  Hospital staff will provide a safe environment and a therapeutic milieu. Patient will be encouraged to participate in unit groups and activities.    Will continue to assess needs and ensure appropriate follow up care is in place.

## 2017-08-17 NOTE — PLAN OF CARE
"Problem: Psychotic Symptoms  Goal: Psychotic Symptoms  Signs and symptoms of listed problems will be absent or manageable.  Outcome: No Change     Pt is a 26 year old biological male who identifies as female (female pronouns), for increasing paranoia and SIB.  Pt endorses HI towards her voices.  Pt was calm and cooperative in the ED - but \"short with answers\".  Pt received no medications in the ED.  Pt was medically cleared in the ED.  VS WNL.  UTOX positive for cannabis - no other labs ordered.  Pt could contract for safety in the ED.  Pt was voluntary.     Pt was brought to [facility].  Pt was calm and cooperative with the admission process and interview.  Pt presents with a flat and blunted affect.  Pt has poor eye contract, slowed speech, normal psychomotor activity.  Pt endorses AH and HI towards her voices.  Pt does not currently endorse VH.       Pt denies SI.  Pt endorses SIB urges, but contracts for safety on the unit.  Pt states \"I cut because I like to see the blood\" \"I don't want to keep doing the self-injury\".  Of note; pt has many piercing's, but pt contracts for safety with the piercings \"I only use razors, these (piercings) aren't sharp\".  Writer assessed at this point pt can be safe with piercing's and would be more therapeutic to allow pt to have piercings.       In the interview room Pt states she is being treated for depression, but does not elaborate.  Pt reports anxiety related to her AH.  Pt AH are telling her they will \"get her\" and pt states she is afraid people will \"get her\".  Pt does not elaborate and is tense when questioned about the voices.     Pt reports poor sleep \"I wake up in the middle of the night seeing people dying\".     Pt reports her support system is her  and friends.       Pt is on truvada, but does not endorse HIV.  Pt had no further medical concerns.     Medication reconciliation was reviewed by pharmacist and rechecked by nurse.     On the unit pt became agitated " and slammed her door, when she was not allowed to have an unsafe clothing item on the unit.  Currently pt is sitting calmly in the lounge.     Pt on suicide, self-injury, assault, and single room precautions.     Will continue to monitor and assess.

## 2017-08-17 NOTE — PROGRESS NOTES
"   08/17/17 1500   Behavioral Health   Hallucinations auditory  (with homicidal thoughts)   Thinking paranoid   Orientation person: oriented;place: oriented;date: oriented   Memory baseline memory   Insight poor   Judgement impaired   Eye Contact (No eye contact, covers over face)   Affect blunted, flat;irritable   Mood depressed   Physical Appearance/Attire attire appropriate to age and situation   Hygiene well groomed   Suicidality other (see comments)  (denies)   Self Injury other (see comment)  (denies)   Elopement (Nothing to report)   Activity isolative;withdrawn   Speech clear;coherent   Medication Sensitivity no stated side effects   Psychomotor / Gait balanced;steady   Psycho Education   Type of Intervention 1:1 intervention   Response participates with encouragement   Hours 0.5   Treatment Detail (check In)   Activities of Daily Living   Hygiene/Grooming independent   Oral Hygiene independent   Dress independent   Room Organization independent   Patient was in her room most of the shift, only coming out for the two meals this shift.  Patient was napping the rest of the shift.  Patient was resistant to engage in a check in, but was willing to talk some.  Patient gave minimal answers and resisted engaging in details about her thoughts.  Patient reports being highly paranoid and have a heave feeling of dread.  Patient reports \"people are out to get (her).\"  Patient also reports having homicidal thoughts and urges, directed at people outside of the hospital.  Patient denies plans to act on these thoughts, but was not willing to further check in about this.  Patient did not have a goal for the shift.  "

## 2017-08-17 NOTE — PROGRESS NOTES
Nay refused her 1200 noon lorazepam and the AM Navane that was missed this morning.    1400 - Nay also refused her Navane at 1400.

## 2017-08-18 PROCEDURE — 12400007 ZZH R&B MH INTERMEDIATE UMMC

## 2017-08-18 PROCEDURE — 99232 SBSQ HOSP IP/OBS MODERATE 35: CPT | Performed by: PSYCHIATRY & NEUROLOGY

## 2017-08-18 PROCEDURE — 25000132 ZZH RX MED GY IP 250 OP 250 PS 637: Performed by: PSYCHIATRY & NEUROLOGY

## 2017-08-18 RX ADMIN — THIOTHIXENE 4 MG: 1 CAPSULE ORAL at 20:09

## 2017-08-18 RX ADMIN — THIOTHIXENE 4 MG: 1 CAPSULE ORAL at 08:49

## 2017-08-18 RX ADMIN — NICOTINE POLACRILEX 4 MG: 2 LOZENGE ORAL at 18:11

## 2017-08-18 RX ADMIN — CLONAZEPAM 1 MG: 1 TABLET ORAL at 21:18

## 2017-08-18 RX ADMIN — NICOTINE POLACRILEX 4 MG: 2 LOZENGE ORAL at 16:02

## 2017-08-18 RX ADMIN — NICOTINE POLACRILEX 4 MG: 2 LOZENGE ORAL at 11:32

## 2017-08-18 RX ADMIN — LORAZEPAM 1 MG: 1 TABLET ORAL at 11:32

## 2017-08-18 RX ADMIN — FLUTICASONE PROPIONATE 2 SPRAY: 50 SPRAY, METERED NASAL at 20:09

## 2017-08-18 RX ADMIN — LORAZEPAM 1 MG: 1 TABLET ORAL at 20:08

## 2017-08-18 RX ADMIN — LITHIUM CARBONATE 300 MG: 300 TABLET, EXTENDED RELEASE ORAL at 21:18

## 2017-08-18 RX ADMIN — NICOTINE POLACRILEX 4 MG: 2 LOZENGE ORAL at 08:48

## 2017-08-18 RX ADMIN — LORAZEPAM 1 MG: 1 TABLET ORAL at 16:03

## 2017-08-18 RX ADMIN — FLUOXETINE 20 MG: 20 CAPSULE ORAL at 08:48

## 2017-08-18 RX ADMIN — ACYCLOVIR 400 MG: 400 TABLET ORAL at 08:48

## 2017-08-18 RX ADMIN — LORATADINE 10 MG: 10 TABLET ORAL at 21:18

## 2017-08-18 RX ADMIN — ACYCLOVIR 400 MG: 400 TABLET ORAL at 20:08

## 2017-08-18 RX ADMIN — LORAZEPAM 1 MG: 1 TABLET ORAL at 08:48

## 2017-08-18 RX ADMIN — NICOTINE POLACRILEX 4 MG: 2 LOZENGE ORAL at 21:17

## 2017-08-18 RX ADMIN — EMTRICITABINE AND TENOFOVIR DISOPROXIL FUMARATE 1 TABLET: 200; 300 TABLET, FILM COATED ORAL at 08:48

## 2017-08-18 RX ADMIN — NICOTINE POLACRILEX 4 MG: 2 LOZENGE ORAL at 20:11

## 2017-08-18 ASSESSMENT — ACTIVITIES OF DAILY LIVING (ADL)
LAUNDRY: WITH SUPERVISION
ORAL_HYGIENE: INDEPENDENT
GROOMING: INDEPENDENT
ORAL_HYGIENE: INDEPENDENT
DRESS: STREET CLOTHES;INDEPENDENT
GROOMING: INDEPENDENT
DRESS: INDEPENDENT

## 2017-08-18 NOTE — PLAN OF CARE
Problem: General Plan of Care (Inpatient Behavioral)  Goal: Team Discussion  Team Plan:   BEHAVIORAL TEAM DISCUSSION     Participants: Dr. Chopra, [facility] Jennifer Baum, RN Maria D Gold  Progress: minimal, new admit  Continued Stay Criteria/Rationale:suicidal ideation, paranoia  Medical/Physical: male to female transgender  Precautions:   Behavioral Orders   Procedures     Assault precautions     Code 1 - Restrict to Unit     Routine Programming       As clinically indicated     Self Injury Precaution     Single Room     Status 15       Every 15 minutes.     Suicide precautions     Plan: further assessment is needed  Rationale for change in precautions or plan: no change

## 2017-08-18 NOTE — PROGRESS NOTES
[facility], [place]   Psychiatric Progress Note        Interim History:   The patient's care was discussed with the treatment team during the daily team meeting and/or staff's chart notes were reviewed.  Staff report patient spent a lot of time in her bed. She was visited by her , visit went OK. She was more open during 1:1: admitted that she was very fearful that her meds were poison, that doctors and staff members would harm her. She refused meds yesterday, took them today. During today's visit with me she was more cooperative and pleasant. Said that she felt slightly more comfortable than yesterday, denied presence of Suicidal ideation, Homicidal thoughts, contracted for safety here. She denied Auditory hallucinations, believes that what she hears is a real thing. She told me that she had tried multiple neuroleptics, older ones seemed to be working for her the best. This is she is now on Navane. She denied having side effects.          Medications:       thiothixene  4 mg Oral BID     acyclovir  400 mg Oral BID     clonazePAM  1 mg Oral At Bedtime     emtricitabine-tenofovir  1 tablet Oral Daily     FLUoxetine  20 mg Oral Daily     fluticasone  2 spray Both Nostrils QPM     lithium  300 mg Oral At Bedtime     loratadine  10 mg Oral At Bedtime     LORazepam  1 mg Oral 4x Daily     [START ON 8/20/2017] estradiol valerate  2 mg Intramuscular Q7 Days          Allergies:   No Known Allergies       Labs:   No results found for this or any previous visit (from the past 24 hour(s)).       Psychiatric Examination:     BP 93/71  Pulse 83  Temp 97.6  F (36.4  C) (Oral)  Resp 16  Ht 1.829 m (6')  Wt 65.5 kg (144 lb 8 oz)  SpO2 100%  BMI 19.6 kg/m2  Weight is 144 lbs 8 oz  Body mass index is 19.6 kg/(m^2).  Orthostatic Vitals       Most Recent      Sitting Orthostatic BP 93/71 08/18 0833    Sitting Orthostatic Pulse (bpm) 83 08/18 0833    Standing Orthostatic /77 08/18 0833     Standing Orthostatic Pulse (bpm) 79 08/18 0833            Appearance: awake, alert and adequately groomed  Attitude:  guarded and more cooperative  Eye Contact:  fair  Mood:  anxious and depressed  Affect:  intensity is exaggerated and labile  Speech:  clear, coherent  Psychomotor Behavior:  no evidence of tardive dyskinesia, dystonia, or tics  Throught Process:  circumstantial  Associations:  no loose associations  Thought Content:  no evidence of suicidal ideation or homicidal ideation and auditory hallucinations present  Insight:  limited  Judgement:  limited  Oriented to:  time, person, and place  Attention Span and Concentration:  limited  Recent and Remote Memory:  limited         Precautions:     Behavioral Orders   Procedures     Assault precautions     Code 1 - Restrict to Unit     Routine Programming     As clinically indicated     Self Injury Precaution     Single Room     Status 15     Every 15 minutes.     Suicide precautions          DIagnoses:   Schizoaffective disorder.  The patient does present as at least having borderline personality disorder traits, possibly fully developed borderline personality disorder.           Plan:   Still exhibits significant psychotic symptoms. Navane dose was increased yesterday. No medication changes today. Will continue to provide support and structure. If this patient asks to leave before showing significant improvement in presenting symptoms, she should be evaluated for the appropriateness of a 72 hour hold.

## 2017-08-18 NOTE — PROGRESS NOTES
"The pt was tearful and withdrawn this shift.  She reports hearing voices that tell her that \"her medicine will harm her and that the doctors and staff here want to hurt her.\"  Her  visited this shift and the visit went well.  The pt was a bit more upbeat while they visited.  When this writer checked in with the pt, the pt was sad and just wanted to have a medication that worked without side effects.  She reports that the Haldol works well with her voices, but make her anxiety horrible.    The new med that the doctors are trying has not had any therapeutic effect as of yet.  She reports that she has been told that they will increase the dose.  The pt neither attended groups, nor ate any meals.     08/17/17 2035   Behavioral Health   Hallucinations auditory   Thinking paranoid;delusional;poor concentration   Orientation person: oriented;place: oriented;date: oriented;time: oriented   Memory baseline memory   Insight poor   Judgement impaired   Eye Contact at examiner   Affect blunted, flat;sad;tense;irritable   Mood depressed;anxious   Physical Appearance/Attire attire appropriate to age and situation;neat   Hygiene well groomed   Suicidality other (see comments)  (none stated)   Self Injury urges;other (see comment);chronic thoughts with no stated plan  (chronic thoughts since early twenties)   Activity isolative;withdrawn   Speech clear;coherent   Medication Sensitivity no observed side effects   Psychomotor / Gait slow;balanced   Substance Withdrawal Interventions   Social and Therapeutic Interventions (Substance Withdrawal) encourage socialization with peers;encourage effective boundaries with peers;encourage participation in therapeutic groups and milieu activities   Safety   Suicidality status 15   Coping/Psychosocial   Verbalized Emotional State anxiety;depression;frustration;fear;hopelessness;powerlessness;sadness   Supportive Measures active listening utilized;relaxation techniques promoted;self-care " encouraged;verbalization of feelings encouraged   Psycho Education   Type of Intervention 1:1 intervention   Response participates, initiates socially appropriate   Hours 0.5   Treatment Detail check-in   Group Therapy Session   Group Attendance refused to attend group session   Activities of Daily Living   Hygiene/Grooming independent   Oral Hygiene independent   Dress street clothes   Laundry unable to complete   Room Organization independent   Activity   Activity Level of Assistance independent   Behavioral Health Interventions   Psychotic Symptoms maintain safety precautions;monitor need to revise level of observation;maintain safe secure environment;reality orientation;simple, clear language;decrease environmental stimulation;redirection of intrusive behaviors;provide emotional support;establish therapeutic relationship;assist with developing & utilizing healthy coping strategies   Social and Therapeutic Interventions (Psychotic Symptoms) encourage socialization with peers;encourage participation in therapeutic groups and milieu activities;encourage effective boundaries with peers

## 2017-08-18 NOTE — H&P
"DATE OF EVALUATION:  08/17/2017.        Nay Lainez was seen for 70 minutes on 08/17/2017.  Greater than 50% of the time was spent on counseling and coordinating care, clarifying diagnostic prognostic issues, presence of support in community.      CHIEF COMPLAINT AND REASON FOR ADMISSION:  The patient is a 26-year-old anatomical male who identifies himself as female and came to the hospital voluntarily with complaints of feeling highly paranoid.      HISTORY OF PRESENT ILLNESS:  Nay Lainez presents as only partially reliable historian.  She was interviewed in bed and she was quite upset that she was not allowed to have her hat.  She has responded to some of my questions but with short phrases and made it clear that she did not enjoy conversation.  She did report hearing voices of her neighbors and thinking that they were out to kill her.  She could not or would not tell me why these neighbors wanted to harm her.  She reported getting medication from provider called Leatha Trujillo from [facility] but admitted she had not been taking them the way they were prescribed because \"she is trying to poison me, you all do.\"  She said that she used to see a therapist but stopped seeing her because \"she is an idiot.\"  She reported that the situation between her and her , and apparently the patient's  is an anatomical female who identifies herself as male, is normal, but would not tell me anything else.  The patient reports that because of stress of her paranoia she started cutting herself.  She cut her neck, her buttocks, her arms, her legs and other areas.  According to the patient's spouse, who was contacted by the emergency department, the patient's paranoia was stronger than ever before.  The patient describes scratching her neck as an attempt to soothe herself, not a suicide attempt.  The patient was inconsistent with statements about her desire to get revenge on people who were talking about " killing her.  She initially said that she had homicidal thoughts but then denied that.  The patient denied current drug or alcohol use other than marijuana.      PAST PSYCHIATRIC HISTORY:  Past diagnoses have included bipolar disorder, solitario with psychotic features, generalized anxiety disorder, anorexia nervosa, alcohol use disorder, schizoaffective disorder, schizophrenia, schizophreniform disorder.  The patient states that she has never been diagnosed with borderline personality disorder or posttraumatic stress disorder.  She sees primary care physician, Dr. Ventura Morelos.  The patient takes HIV medications on a preventative basis due to multiple sexual partners.  The patient has a history of being treated with multiple antipsychotics, including Navane, Zyprexa, Haldol, and has a history of multiple previous hospitalizations.  In addition to the above-mentioned medications, the patient has tried clonazepam, lorazepam, Xanax, fluoxetine, gabapentin, Seroquel, bupropion, citalopram.  Has not been committed.  Past suicide attempts:  None but has a history of self-injurious behavior.  History of being treated at [facility] in 07/2016.  Drugs of choice are marijuana and infrequent alcohol use.      PAST MEDICAL HISTORY:  Significant for self-inflicted superficial lacerations, see above.  The patient denied any significant health problems.  She takes acyclovir prophylactically and Truvada because of multiple sexual partners, also prophylactically.      HOME MEDICATIONS:     1.  Thiothixene 2 mg 3 times a day.   2.  Lorazepam 1 mg 4 times a day.   3.  Estradiol 20 mg injection every 7 days.     4.  Lithium 300 mg CR at bedtime.   5.  Clonazepam 1 mg at bedtime.   6.  Fluoxetine 20 mg daily.   7.  Truvada 200-300 mg per tablet, 1 tablet daily.   8.  Acyclovir 400 mg 2 times a day.   9.  Flonase 2 sprays to both nostrils every evening.    10.  Loratadine 10 mg at bedtime.      ALLERGIES:  The patient denied  "any known medical allergies.      FAMILY AND SOCIAL HISTORY:  Alcohol use disorder in sister and mother.  No known family history of mental illness.  No completed or attempted suicides in friends or family.  Born and raised in [place], has no contact with family,  to  for 2 years.  Lives in an apartment in [place] with .  High school graduate, some college.  Used to work as a  and quit working because of her mental health symptoms.  Denied any legal or abuse history.      PHYSICAL EXAMINATION:  Please refer to Dr. Hernando Liu's note from 08/16/2017.      REVIEW OF SYSTEMS:  A 12-point review of systems was positive for the above-mentioned.  The rest was negative.      VITAL SIGNS:  Temperature 97.5, respirations 16, blood pressure 110/68, heart rate 72.      MENTAL STATUS EXAMINATION:  The patient is a  male with multiple piercings on his head.  He has make-up, plucked brows, and presents as female, guarded and very irritable, with limited eye contact.  Speech is slow, monotone and nonproductive.  Reports being afraid of \"everyone, including you.\"  Reports not feeling safe being in the hospital.  The patient showed only limited cooperation and answered me questions only after I told him that he came to the hospital to get help and that means that he would have to cooperate with treatment team.  He reported still hearing voices, thinking that people are trying to get him in here, denied visual hallucinations.  Thought processes were positive for delusional thinking, see above.  He did not voice any other delusional ideas.  Thought processes overall were slow, but there was no evidence of loose associations.  The patient denied presence of homicidal thoughts but earlier reported having them.  Ability to focus and concentrate were decreased.  Mood depressed.  Affect constricted and irritable.  Insight and judgment are both limited and there were no abnormal involuntary " movements noted during the interview.  Gait and station were not checked.      IMPRESSION:  Schizoaffective disorder.  The patient does present as at least having borderline personality disorder traits, possibly fully developed borderline personality disorder.      TREATMENT PLAN: I am going to increase her dose of Navane to 4 mg 2 times a day.  I am going to discuss with her of being simultaneously on Klonopin and lorazepam.  I do not want to discontinue one of them today, as will patient likely demand to be discharged from hospital.  She will stay in the hospital voluntarily; however, because of her intense paranoia, need for 72-hour hold should be considered if she demands to leave before significant improvement in her mental health symptoms is achieved.         BOB LINDQUIST MD             D: 2017 15:48   T: 2017 19:34   MT:       Name:     ROSY DIEHL   MRN:      [id number]        Account:      [id number]   :      1990           Admitted:     [id number]      Document: [id number]

## 2017-08-19 PROCEDURE — 12400007 ZZH R&B MH INTERMEDIATE UMMC

## 2017-08-19 PROCEDURE — 25000132 ZZH RX MED GY IP 250 OP 250 PS 637: Performed by: PSYCHIATRY & NEUROLOGY

## 2017-08-19 RX ADMIN — FLUOXETINE 20 MG: 20 CAPSULE ORAL at 08:40

## 2017-08-19 RX ADMIN — NICOTINE POLACRILEX 4 MG: 2 LOZENGE ORAL at 20:06

## 2017-08-19 RX ADMIN — EMTRICITABINE AND TENOFOVIR DISOPROXIL FUMARATE 1 TABLET: 200; 300 TABLET, FILM COATED ORAL at 08:40

## 2017-08-19 RX ADMIN — NICOTINE POLACRILEX 4 MG: 2 LOZENGE ORAL at 18:09

## 2017-08-19 RX ADMIN — LORAZEPAM 1 MG: 1 TABLET ORAL at 16:20

## 2017-08-19 RX ADMIN — NICOTINE POLACRILEX 4 MG: 2 LOZENGE ORAL at 08:42

## 2017-08-19 RX ADMIN — NICOTINE POLACRILEX 4 MG: 2 LOZENGE ORAL at 12:23

## 2017-08-19 RX ADMIN — LORAZEPAM 1 MG: 1 TABLET ORAL at 08:40

## 2017-08-19 RX ADMIN — NICOTINE POLACRILEX 4 MG: 2 LOZENGE ORAL at 16:20

## 2017-08-19 RX ADMIN — ACYCLOVIR 400 MG: 400 TABLET ORAL at 20:06

## 2017-08-19 RX ADMIN — NICOTINE POLACRILEX 4 MG: 2 LOZENGE ORAL at 14:42

## 2017-08-19 RX ADMIN — NICOTINE POLACRILEX 4 MG: 2 LOZENGE ORAL at 17:17

## 2017-08-19 RX ADMIN — LORAZEPAM 1 MG: 1 TABLET ORAL at 20:06

## 2017-08-19 RX ADMIN — NICOTINE POLACRILEX 4 MG: 2 LOZENGE ORAL at 06:52

## 2017-08-19 RX ADMIN — LORAZEPAM 1 MG: 1 TABLET ORAL at 12:23

## 2017-08-19 RX ADMIN — LITHIUM CARBONATE 300 MG: 300 TABLET, EXTENDED RELEASE ORAL at 21:07

## 2017-08-19 RX ADMIN — ACYCLOVIR 400 MG: 400 TABLET ORAL at 08:40

## 2017-08-19 RX ADMIN — CLONAZEPAM 1 MG: 1 TABLET ORAL at 21:07

## 2017-08-19 RX ADMIN — LORATADINE 10 MG: 10 TABLET ORAL at 21:07

## 2017-08-19 RX ADMIN — FLUTICASONE PROPIONATE 2 SPRAY: 50 SPRAY, METERED NASAL at 20:07

## 2017-08-19 RX ADMIN — THIOTHIXENE 4 MG: 1 CAPSULE ORAL at 08:41

## 2017-08-19 RX ADMIN — NICOTINE POLACRILEX 4 MG: 2 LOZENGE ORAL at 21:08

## 2017-08-19 RX ADMIN — THIOTHIXENE 4 MG: 1 CAPSULE ORAL at 20:07

## 2017-08-19 ASSESSMENT — ACTIVITIES OF DAILY LIVING (ADL)
GROOMING: INDEPENDENT
HYGIENE/GROOMING: INDEPENDENT
ORAL_HYGIENE: INDEPENDENT
DRESS: INDEPENDENT
ORAL_HYGIENE: INDEPENDENT
DRESS: STREET CLOTHES

## 2017-08-19 NOTE — PLAN OF CARE
"Problem: Psychotic Symptoms  Goal: Psychotic Symptoms  Signs and symptoms of listed problems will be absent or manageable.   Outcome: No Change  48 Hour Assessment    Pt refused vitals    Pt was visible in the milieu off and on throughout the shift. She displayed a blunted affect, and isolated from other patients. When asked how she is feeling today, she states \"I just want to get out of here. I feel slightly paranoid, but it's nothing that I haven't felt before.\" Pt took all of her medications without issue.    Pt had visitors, and was in good spirits during, and after, their visit.      SI/SIB: Pt denies, and appeared anxious that writer was inquiring.     Auditory/Visual Hallucinations: Pt denies     No additional concerns at this time. Will continue to assess.             "

## 2017-08-19 NOTE — PROGRESS NOTES
08/19/17 1512   Behavioral Health   Hallucinations denies / not responding to hallucinations   Thinking distractable;paranoid   Orientation person: oriented;place: oriented;date: oriented;time: oriented   Memory baseline memory   Insight poor   Judgement impaired   Eye Contact at examiner   Affect blunted, flat   Mood mood is calm   Physical Appearance/Attire appears stated age   Hygiene neglected grooming - unclean body, hair, teeth   Suicidality other (see comments)  (denies)   Self Injury other (see comment)  (denies )   Elopement (not shown )   Activity isolative;withdrawn   Speech clear;coherent   Medication Sensitivity no stated side effects   Psychomotor / Gait balanced   Psycho Education   Type of Intervention 1:1 intervention   Response participates, initiates socially appropriate   Hours 0.5   Treatment Detail (check-in)   Activities of Daily Living   Hygiene/Grooming independent   Oral Hygiene independent   Dress independent   Room Organization independent   Groups   Details (isolative )   Behavioral Health Interventions   Psychotic Symptoms maintain safety precautions;encourage nutrition and hydration;encourage participation / independence with adls;provide emotional support;establish therapeutic relationship;assist with developing & utilizing healthy coping strategies;build upon strengths   Social and Therapeutic Interventions (Psychotic Symptoms) encourage socialization with peers;encourage participation in therapeutic groups and milieu activities     Pt spent most of the shift in her room napping. Came out of her room for meals and during visiting hour. Pt had a visit from her  and a friend and stated visit went well. Pt denied suicidal ideations.

## 2017-08-20 PROCEDURE — 12400007 ZZH R&B MH INTERMEDIATE UMMC

## 2017-08-20 PROCEDURE — 25000132 ZZH RX MED GY IP 250 OP 250 PS 637: Performed by: PSYCHIATRY & NEUROLOGY

## 2017-08-20 PROCEDURE — 25000128 H RX IP 250 OP 636: Performed by: PSYCHIATRY & NEUROLOGY

## 2017-08-20 RX ADMIN — NICOTINE POLACRILEX 4 MG: 2 LOZENGE ORAL at 15:59

## 2017-08-20 RX ADMIN — FLUTICASONE PROPIONATE 2 SPRAY: 50 SPRAY, METERED NASAL at 20:12

## 2017-08-20 RX ADMIN — LORATADINE 10 MG: 10 TABLET ORAL at 20:16

## 2017-08-20 RX ADMIN — CLONAZEPAM 1 MG: 1 TABLET ORAL at 20:16

## 2017-08-20 RX ADMIN — LORAZEPAM 1 MG: 1 TABLET ORAL at 15:59

## 2017-08-20 RX ADMIN — LORAZEPAM 1 MG: 1 TABLET ORAL at 07:43

## 2017-08-20 RX ADMIN — ESTRADIOL VALERATE 2 MG: 20 INJECTION INTRAMUSCULAR at 16:26

## 2017-08-20 RX ADMIN — ACYCLOVIR 400 MG: 400 TABLET ORAL at 07:43

## 2017-08-20 RX ADMIN — LITHIUM CARBONATE 300 MG: 300 TABLET, EXTENDED RELEASE ORAL at 20:16

## 2017-08-20 RX ADMIN — NICOTINE POLACRILEX 4 MG: 2 LOZENGE ORAL at 17:05

## 2017-08-20 RX ADMIN — THIOTHIXENE 4 MG: 1 CAPSULE ORAL at 07:43

## 2017-08-20 RX ADMIN — LORAZEPAM 1 MG: 1 TABLET ORAL at 11:42

## 2017-08-20 RX ADMIN — NICOTINE POLACRILEX 4 MG: 2 LOZENGE ORAL at 14:38

## 2017-08-20 RX ADMIN — NICOTINE POLACRILEX 4 MG: 2 LOZENGE ORAL at 22:23

## 2017-08-20 RX ADMIN — NICOTINE POLACRILEX 4 MG: 2 LOZENGE ORAL at 07:46

## 2017-08-20 RX ADMIN — NICOTINE POLACRILEX 4 MG: 2 LOZENGE ORAL at 20:15

## 2017-08-20 RX ADMIN — EMTRICITABINE AND TENOFOVIR DISOPROXIL FUMARATE 1 TABLET: 200; 300 TABLET, FILM COATED ORAL at 07:43

## 2017-08-20 RX ADMIN — NICOTINE POLACRILEX 4 MG: 2 LOZENGE ORAL at 18:11

## 2017-08-20 RX ADMIN — NICOTINE POLACRILEX 4 MG: 2 LOZENGE ORAL at 11:42

## 2017-08-20 RX ADMIN — FLUOXETINE 20 MG: 20 CAPSULE ORAL at 07:43

## 2017-08-20 RX ADMIN — NICOTINE POLACRILEX 4 MG: 2 LOZENGE ORAL at 21:10

## 2017-08-20 RX ADMIN — ACYCLOVIR 400 MG: 400 TABLET ORAL at 20:16

## 2017-08-20 RX ADMIN — THIOTHIXENE 4 MG: 1 CAPSULE ORAL at 20:15

## 2017-08-20 ASSESSMENT — ACTIVITIES OF DAILY LIVING (ADL)
ORAL_HYGIENE: INDEPENDENT
DRESS: STREET CLOTHES;INDEPENDENT
DRESS: INDEPENDENT
GROOMING: INDEPENDENT
LAUNDRY: WITH SUPERVISION
GROOMING: INDEPENDENT
ORAL_HYGIENE: INDEPENDENT

## 2017-08-20 NOTE — PROGRESS NOTES
Pt was pleasant with staff, but somewhat withdrawn. She had visitors, and stated that the visit went well.  Pt took her medications without issue, and denies medication side effects.     Pt requested to shower, and was provided with toiletries to do so.     No additional concerns at this time. Will continue to monitor and assess.

## 2017-08-20 NOTE — PROGRESS NOTES
Patient reports feeling anxious but stable and denies current SI/SIB or psychotic symptoms. Patient states he paranoia has improved and she hopes to discharge on Monday. Patient was visible in the milieu but withdrawn with flat affect.     08/19/17 2000   Behavioral Health   Hallucinations denies / not responding to hallucinations   Thinking poor concentration   Orientation person: oriented;place: oriented;date: oriented;time: oriented   Memory baseline memory   Insight poor   Judgement impaired   Eye Contact at examiner   Affect blunted, flat   Mood anxious   Physical Appearance/Attire appears stated age;attire appropriate to age and situation   Hygiene well groomed   Suicidality other (see comments)  (Denies)   Self Injury other (see comment)  (Denies)   Activity withdrawn   Speech clear;coherent   Medication Sensitivity no stated side effects;no observed side effects   Psychomotor / Gait balanced;steady   Activities of Daily Living   Hygiene/Grooming independent   Oral Hygiene independent   Dress street clothes   Room Organization independent

## 2017-08-20 NOTE — PROGRESS NOTES
08/20/17 1100   Behavioral Health   Hallucinations denies / not responding to hallucinations   Thinking poor concentration   Orientation person: oriented;place: oriented   Memory baseline memory   Insight poor   Judgement impaired   Eye Contact at examiner   Affect blunted, flat   Mood mood is calm   Physical Appearance/Attire attire appropriate to age and situation   Hygiene well groomed   Suicidality other (see comments)  (denies)   Self Injury other (see comment)  (denies)   Activity isolative;withdrawn   Speech clear;coherent   Medication Sensitivity no stated side effects;no observed side effects   Psychomotor / Gait balanced;steady   Psycho Education   Type of Intervention 1:1 intervention   Response participates, initiates socially appropriate   Hours 0.5   Treatment Detail checkin   Activities of Daily Living   Hygiene/Grooming independent   Oral Hygiene independent   Dress independent   Room Organization independent   Behavioral Health Interventions   Psychotic Symptoms maintain safety precautions;monitor need to revise level of observation;maintain safe secure environment;reality orientation;simple, clear language;decrease environmental stimulation;redirection of intrusive behaviors;redirection of aggressive behaviors;assist patient in developing safety plan;assist patient in following safety plan;encourage nutrition and hydration;encourage participation / independence with adls;provide emotional support;establish therapeutic relationship;assist with developing & utilizing healthy coping strategies;build upon strengths   Social and Therapeutic Interventions (Psychotic Symptoms) encourage socialization with peers;encourage participation in therapeutic groups and milieu activities   Pt mood was calm today. She reports feeling content. Pt was isolative to room most of the shift; she did not attend any groups, came out for breakfast and lunch. Her goal today was to read. Pt denies depression, anxiety, and  hallucinations. Pt also denies SI and SIB.

## 2017-08-21 ENCOUNTER — APPOINTMENT (OUTPATIENT)
Dept: BEHAVIORAL HEALTH | Facility: CLINIC | Age: 27
End: 2017-08-21
Attending: PSYCHIATRY & NEUROLOGY
Payer: COMMERCIAL

## 2017-08-21 VITALS
HEART RATE: 83 BPM | WEIGHT: 147 LBS | OXYGEN SATURATION: 100 % | HEIGHT: 72 IN | TEMPERATURE: 97.5 F | DIASTOLIC BLOOD PRESSURE: 71 MMHG | SYSTOLIC BLOOD PRESSURE: 93 MMHG | RESPIRATION RATE: 16 BRPM | BODY MASS INDEX: 19.91 KG/M2

## 2017-08-21 PROCEDURE — 99239 HOSP IP/OBS DSCHRG MGMT >30: CPT | Performed by: PSYCHIATRY & NEUROLOGY

## 2017-08-21 PROCEDURE — 25000132 ZZH RX MED GY IP 250 OP 250 PS 637: Performed by: PSYCHIATRY & NEUROLOGY

## 2017-08-21 PROCEDURE — 99212 OFFICE O/P EST SF 10 MIN: CPT

## 2017-08-21 RX ORDER — THIOTHIXENE 2 MG/1
4 CAPSULE ORAL 2 TIMES DAILY
Qty: 120 CAPSULE | Refills: 1
Start: 2017-08-21 | End: 2017-09-21

## 2017-08-21 RX ADMIN — NICOTINE POLACRILEX 4 MG: 2 LOZENGE ORAL at 08:12

## 2017-08-21 RX ADMIN — LORAZEPAM 1 MG: 1 TABLET ORAL at 08:12

## 2017-08-21 RX ADMIN — EMTRICITABINE AND TENOFOVIR DISOPROXIL FUMARATE 1 TABLET: 200; 300 TABLET, FILM COATED ORAL at 08:12

## 2017-08-21 RX ADMIN — ACYCLOVIR 400 MG: 400 TABLET ORAL at 08:12

## 2017-08-21 RX ADMIN — FLUOXETINE 20 MG: 20 CAPSULE ORAL at 08:12

## 2017-08-21 RX ADMIN — NICOTINE POLACRILEX 4 MG: 2 LOZENGE ORAL at 09:26

## 2017-08-21 RX ADMIN — THIOTHIXENE 4 MG: 1 CAPSULE ORAL at 08:12

## 2017-08-21 RX ADMIN — NICOTINE POLACRILEX 4 MG: 2 LOZENGE ORAL at 10:37

## 2017-08-21 NOTE — DISCHARGE INSTRUCTIONS
Behavioral Discharge Planning and Instructions          Summary:  You were admitted on 8/16/2017 for psychosis. Patient reported hearing voices in the hallway and believed the people were trying to kill her. Patient now denies any thoughts of self harm or harm to others.   You were treated by Dr. Dread Chopra MD and discharged on  from [facility]      Main Diagnosis: Major Depressive Disorder, Generalized Anxiety Disorder      Health Care Follow-up Appointments:   [facility]  [phone]  [address] [place] [address]  Leatha Trujillo Wednesday September 20th @ 3:20      Patient declined referral for psychotherapy, day treatment or other mental health follow up.           Attend all scheduled appointments with your outpatient providers. Call at least 24 hours in advance if you need to reschedule an appointment to ensure continued access to your outpatient providers.   Major Treatments, Procedures and Findings:  You were provided with: a psychiatric assessment and medication evaluation and/or management    Symptoms to Report: feeling more aggressive, increased confusion, losing more sleep, mood getting worse or thoughts of suicide    Early warning signs can include: increased depression or anxiety sleep disturbances increased thoughts or behaviors of suicide or self-harm  increased unusual thinking, such as paranoia or hearing voices    Safety and Wellness:  Take all medicines as directed.  Make no changes unless your doctor suggests them.      Follow treatment recommendations.  Refrain from alcohol and non-prescribed drugs.  If there is a concern for safety, call 911.    Resources:   Crisis Intervention: 938.419.9692 or 105-891-8069 (TTY: 140.294.2137).  Call anytime for help.  [place] [employer] (www.[place].monse.org): 614.448.1642 or 051-116-8201.  M[employer] of [place] (www.mentalhealth.org): 647.681.8385 or 353-171-9608    The treatment team has appreciated the  opportunity to work with you.     If you have any questions or concerns our unit number is [phone].  You may be receiving a follow-up phone call within the next three days from a representative from behavioral health.

## 2017-08-21 NOTE — PROGRESS NOTES
PT discharged home accompanied by friend. Pt denies hearing voices, denies SI, in good spirits. Pt has large support system. All belongings and meds sent with pt.

## 2017-08-22 ENCOUNTER — TELEPHONE (OUTPATIENT)
Dept: BEHAVIORAL HEALTH | Facility: CLINIC | Age: 27
End: 2017-08-22

## 2017-08-22 NOTE — DISCHARGE SUMMARY
"Psychiatric Discharge Summary    Nay Lainez MRN# [id number]   Age: 26 year old YOB: 1990     Date of Admission:  8/16/2017  Date of Discharge:  8/21/2017 12:03 PM  Admitting Physician:  Dread Chopra MD  Discharge Physician:  Dread Chopra MD (Contact: Pager: [phone])         Event Leading to Hospitalization:   The patient is a 26-year-old anatomical male who identifies himself as female and came to the hospital voluntarily with complaints of feeling highly paranoid.       HISTORY OF PRESENT ILLNESS:  Nay Lainez presents as only partially reliable historian.  She was interviewed in bed and she was quite upset that she was not allowed to have her hat.  She has responded to some of my questions but with short phrases and made it clear that she did not enjoy conversation.  She did report hearing voices of her neighbors and thinking that they were out to kill her.  She could not or would not tell me why these neighbors wanted to harm her.  She reported getting medication from provider called Leatha Trujillo from [facility] but admitted she had not been taking them the way they were prescribed because \"she is trying to poison me, you all do.\"  She said that she used to see a therapist but stopped seeing her because \"she is an idiot.\"  She reported that the situation between her and her , and apparently the patient's  is an anatomical female who identifies herself as male, is normal, but would not tell me anything else.  The patient reports that because of stress of her paranoia she started cutting herself.  She cut her neck, her buttocks, her arms, her legs and other areas.  According to the patient's spouse, who was contacted by the emergency department, the patient's paranoia was stronger than ever before.  The patient describes scratching her neck as an attempt to soothe herself, not a suicide attempt.  The patient was inconsistent with statements about " her desire to get revenge on people who were talking about killing her.  She initially said that she had homicidal thoughts but then denied that.  The patient denied current drug or alcohol use other than marijuana.       PAST PSYCHIATRIC HISTORY:  Past diagnoses have included bipolar disorder, solitario with psychotic features, generalized anxiety disorder, anorexia nervosa, alcohol use disorder, schizoaffective disorder, schizophrenia, schizophreniform disorder.  The patient states that she has never been diagnosed with borderline personality disorder or posttraumatic stress disorder.  She sees primary care physician, Dr. Ventura Morelos.  The patient takes HIV medications on a preventative basis due to multiple sexual partners.  The patient has a history of being treated with multiple antipsychotics, including Navane, Zyprexa, Haldol, and has a history of multiple previous hospitalizations.  In addition to the above-mentioned medications, the patient has tried clonazepam, lorazepam, Xanax, fluoxetine, gabapentin, Seroquel, bupropion, citalopram.  Has not been committed.  Past suicide attempts:  None but has a history of self-injurious behavior.  History of being [facility] at [facility] 07/[facility].  Drugs of choice are marijuana and infrequent alcohol use.            See Admission note by Derad Chopra MD for additional details.          DIagnoses:     Schizoaffective disorder.  The patient does present as at least having borderline personality disorder traits, possibly fully developed borderline personality disorder.           Labs:     Results for ROSY DIEHL (MRN [id number]) as of 8/22/2017 17:38   Ref. Range 8/16/2017 13:57 8/17/2017 08:11   Sodium Latest Ref Range: 133 - 144 mmol/L  142   Potassium Latest Ref Range: 3.4 - 5.3 mmol/L  4.1   Chloride Latest Ref Range: 94 - 109 mmol/L  109   Carbon Dioxide Latest Ref Range: 20 - 32 mmol/L  22   Urea Nitrogen Latest Ref Range: 7 - 30 mg/dL  9    Creatinine Latest Ref Range: 0.66 - 1.25 mg/dL  0.73   GFR Estimate Latest Ref Range: >60 mL/min/1.7m2  >90   GFR Estimate If Black Latest Ref Range: >60 mL/min/1.7m2  >90   Calcium Latest Ref Range: 8.5 - 10.1 mg/dL  8.5   Anion Gap Latest Ref Range: 3 - 14 mmol/L  11   Albumin Latest Ref Range: 3.4 - 5.0 g/dL  3.6   Protein Total Latest Ref Range: 6.8 - 8.8 g/dL  6.7 (L)   Bilirubin Total Latest Ref Range: 0.2 - 1.3 mg/dL  0.5   Alkaline Phosphatase Latest Ref Range: 40 - 150 U/L  102   ALT Latest Ref Range: 0 - 70 U/L  23   AST Latest Ref Range: 0 - 45 U/L  15   Cholesterol Latest Ref Range: <200 mg/dL  146   HDL Cholesterol Latest Ref Range: >39 mg/dL  43   LDL Cholesterol Calculated Latest Ref Range: <100 mg/dL  87   Non HDL Cholesterol Latest Ref Range: <130 mg/dL  103   Triglycerides Latest Ref Range: <150 mg/dL  80   TSH Latest Ref Range: 0.40 - 4.00 mU/L  0.57   Glucose Latest Ref Range: 70 - 99 mg/dL  89   WBC Latest Ref Range: 4.0 - 11.0 10e9/L  5.4   Hemoglobin Latest Ref Range: 13.3 - 17.7 g/dL  14.8   Hematocrit Latest Ref Range: 40.0 - 53.0 %  41.5   Platelet Count Latest Ref Range: 150 - 450 10e9/L  191   RBC Count Latest Ref Range: 4.4 - 5.9 10e12/L  4.45   MCV Latest Ref Range: 78 - 100 fl  93   MCH Latest Ref Range: 26.5 - 33.0 pg  33.3 (H)   MCHC Latest Ref Range: 31.5 - 36.5 g/dL  35.7   RDW Latest Ref Range: 10.0 - 15.0 %  12.3   Diff Method Unknown  Automated Method   % Neutrophils Latest Units: %  58.4   % Lymphocytes Latest Units: %  27.5   % Monocytes Latest Units: %  7.1   % Eosinophils Latest Units: %  6.4   % Basophils Latest Units: %  0.6   % Immature Granulocytes Latest Units: %  0.0   Nucleated RBCs Latest Ref Range: 0 /100  0   Absolute Neutrophil Latest Ref Range: 1.6 - 8.3 10e9/L  3.1   Absolute Lymphocytes Latest Ref Range: 0.8 - 5.3 10e9/L  1.5   Absolute Monocytes Latest Ref Range: 0.0 - 1.3 10e9/L  0.4   Absolute Eosinophils Latest Ref Range: 0.0 - 0.7 10e9/L  0.3   Absolute  Basophils Latest Ref Range: 0.0 - 0.2 10e9/L  0.0   Abs Immature Granulocytes Latest Ref Range: 0 - 0.4 10e9/L  0.0   Absolute Nucleated RBC Unknown  0.0   Amphetamine Qual Urine Latest Ref Range: NEG^Negative  Negative    Cocaine Qual Urine Latest Ref Range: NEG^Negative  Negative    Opiates Qualitative Urine Latest Ref Range: NEG^Negative  Negative    Cannabinoids Qual Urine Latest Ref Range: NEG^Negative  Positive (A)    Barbiturates Qual Urine Latest Ref Range: NEG^Negative  Negative    Benzodiazepine Qual Urine Latest Ref Range: NEG^Negative  Negative    Ethanol Qual Urine Latest Ref Range: NEG^Negative  Negative             Consults:   No consultations were requested during this admission         Hospital Course:   Nay Lainez was admitted to [facility] with attending Dread Chopra MD as a voluntary patient. The patient was placed under status 15 (15 minute checks) to ensure patient safety. The patient spent first days of hospital stay at her bed. She was very irritable and showed little interest in talking. She also appeared to be entitled, became very upset when was told she could not keep her hat. She did report paranoia, but denied Auditory hallucinations?! Patient's  confirmed that her paranoia had recently been getting worse. Nay reported being in the past on many 2nd generation neuroleptics, stated that the only ones which were helpful were older ones. Her dose of Navane was increased during this hospital stay. Nay reported drastic improvement in her paranoia, stated she was not afraid of returning back to community and her and her  apartment. She spent more time in community, socialized with peers, appeared to be more comfortable, contracted for safety. She ruled out on a number of occasions seeing a therapist, agreed to continue psychiatric care from her current prescriber.     Nay Lainez did participate in groups and was visible in the milieu.     The patient's  symptoms of psychosis improved.     Nay Lainez was released to home. At the time of discharge Nay Lainez was determined to not be a danger to himself or others.          Discharge Medications:     Discharge Medication List as of 8/21/2017 10:37 AM      CONTINUE these medications which have NOT CHANGED    Details   LORazepam (ATIVAN) 1 MG tablet Take 1 mg by mouth 4 times daily, Historical      estradiol valerate (DELESTROGEN) 20 MG/ML injection Inject 2 mg into the muscle every 7 days Takes on Sundays Historical      lithium (ESKALITH/LITHOBID) 300 MG CR tablet Take 1 tablet (300 mg) by mouth At Bedtime, Disp-30 tablet, R-0, E-Prescribe      clonazePAM (KLONOPIN) 1 MG tablet Take 1 mg by mouth At Bedtime , Historical      FLUoxetine (PROZAC) 10 MG capsule Take 20 mg by mouth daily , Historical      emtricitabine-tenofovir (TRUVADA) 200-300 MG per tablet Take 1 tablet by mouth daily , Historical      ACYCLOVIR PO Take 400 mg by mouth 2 times daily , Historical      fluticasone (FLONASE) 50 MCG/ACT nasal spray Spray 2 sprays into both nostrils every evening , Historical      loratadine (CLARITIN) 10 MG tablet Take 10 mg by mouth At Bedtime , Historical      Thiothixene (NAVANE PO) Take 2 mg by mouth 3 times daily, Historical                  Psychiatric Examination:   Appearance:  awake, alert  Attitude:  more cooperative  Eye Contact:  fair  Mood:  anxious and sad   Affect:  mood congruent and constricted mobility  Speech:  clear, coherent  Psychomotor Behavior:  no evidence of tardive dyskinesia, dystonia, or tics and intact station, gait and muscle tone  Thought Process:  linear and goal oriented  Associations:  no loose associations  Thought Content:  no evidence of suicidal ideation or homicidal ideation and no evidence of psychotic thought  Insight:  partial  Judgment:  fair  Oriented to:  time, person, and place  Attention Span and Concentration:  fair  Recent and Remote Memory:  fair  Language: Able to  name objects, Able to repeat phrases and Able to read and write  Fund of Knowledge: appropriate  Muscle Strength and Tone: normal  Gait and Station: Normal         Discharge Plan:     Health Care Follow-up Appointments:   [facility]  [phone]  [address] [place] [address]  [facility] Wednesday September 20th @ 3:20      Patient declined referral for psychotherapy, day treatment or other mental health follow up.       Attestation:  The patient has been seen and evaluated by me,  Dread Chopra MD on 8/21/2017 between 12:40 and 13:20.

## 2017-09-21 ENCOUNTER — HOSPITAL ENCOUNTER (INPATIENT)
Facility: CLINIC | Age: 27
LOS: 4 days | Discharge: HOME | DRG: 885 | End: 2017-09-25
Attending: PSYCHIATRY & NEUROLOGY | Admitting: PSYCHIATRY & NEUROLOGY
Payer: COMMERCIAL

## 2017-09-21 DIAGNOSIS — F25.9 SCHIZOAFFECTIVE DISORDER, UNSPECIFIED TYPE (H): ICD-10-CM

## 2017-09-21 PROCEDURE — 99284 EMERGENCY DEPT VISIT MOD MDM: CPT | Mod: Z6 | Performed by: PSYCHIATRY & NEUROLOGY

## 2017-09-21 PROCEDURE — 80320 DRUG SCREEN QUANTALCOHOLS: CPT | Performed by: FAMILY MEDICINE

## 2017-09-21 PROCEDURE — 99285 EMERGENCY DEPT VISIT HI MDM: CPT | Mod: 25 | Performed by: PSYCHIATRY & NEUROLOGY

## 2017-09-21 PROCEDURE — 12400007 ZZH R&B MH INTERMEDIATE UMMC

## 2017-09-21 PROCEDURE — 90791 PSYCH DIAGNOSTIC EVALUATION: CPT

## 2017-09-21 PROCEDURE — 25000132 ZZH RX MED GY IP 250 OP 250 PS 637: Performed by: PSYCHIATRY & NEUROLOGY

## 2017-09-21 PROCEDURE — 80307 DRUG TEST PRSMV CHEM ANLYZR: CPT | Performed by: FAMILY MEDICINE

## 2017-09-21 RX ORDER — FLUTICASONE PROPIONATE 50 MCG
2 SPRAY, SUSPENSION (ML) NASAL EVERY EVENING
Status: DISCONTINUED | OUTPATIENT
Start: 2017-09-21 | End: 2017-09-25 | Stop reason: HOSPADM

## 2017-09-21 RX ORDER — EMTRICITABINE AND TENOFOVIR DISOPROXIL FUMARATE 200; 300 MG/1; MG/1
1 TABLET, FILM COATED ORAL DAILY
Status: DISCONTINUED | OUTPATIENT
Start: 2017-09-22 | End: 2017-09-25 | Stop reason: HOSPADM

## 2017-09-21 RX ORDER — CLONAZEPAM 1 MG/1
1 TABLET ORAL AT BEDTIME
Status: DISCONTINUED | OUTPATIENT
Start: 2017-09-21 | End: 2017-09-25 | Stop reason: HOSPADM

## 2017-09-21 RX ORDER — RISPERIDONE 2 MG/1
4 TABLET ORAL 2 TIMES DAILY
Status: DISCONTINUED | OUTPATIENT
Start: 2017-09-21 | End: 2017-09-25 | Stop reason: HOSPADM

## 2017-09-21 RX ORDER — LORATADINE 10 MG/1
10 TABLET ORAL AT BEDTIME
Status: DISCONTINUED | OUTPATIENT
Start: 2017-09-21 | End: 2017-09-25 | Stop reason: HOSPADM

## 2017-09-21 RX ORDER — HYDROXYZINE HYDROCHLORIDE 25 MG/1
25-50 TABLET, FILM COATED ORAL EVERY 4 HOURS PRN
Status: DISCONTINUED | OUTPATIENT
Start: 2017-09-21 | End: 2017-09-25 | Stop reason: HOSPADM

## 2017-09-21 RX ORDER — LITHIUM CARBONATE 300 MG/1
600 TABLET, FILM COATED, EXTENDED RELEASE ORAL AT BEDTIME
Status: DISCONTINUED | OUTPATIENT
Start: 2017-09-21 | End: 2017-09-25 | Stop reason: HOSPADM

## 2017-09-21 RX ADMIN — RISPERIDONE 4 MG: 2 TABLET ORAL at 23:46

## 2017-09-21 RX ADMIN — FLUTICASONE PROPIONATE 2 SPRAY: 50 SPRAY, METERED NASAL at 23:46

## 2017-09-21 RX ADMIN — CLONAZEPAM 1 MG: 1 TABLET ORAL at 23:46

## 2017-09-21 RX ADMIN — LORATADINE 10 MG: 10 TABLET ORAL at 23:46

## 2017-09-21 RX ADMIN — LITHIUM CARBONATE 600 MG: 300 TABLET, EXTENDED RELEASE ORAL at 23:46

## 2017-09-21 ASSESSMENT — ACTIVITIES OF DAILY LIVING (ADL)
TRANSFERRING: 0-->INDEPENDENT
FALL_HISTORY_WITHIN_LAST_SIX_MONTHS: NO
RETIRED_EATING: 0-->INDEPENDENT
BATHING: 0-->INDEPENDENT
SWALLOWING: 0-->SWALLOWS FOODS/LIQUIDS WITHOUT DIFFICULTY
AMBULATION: 0-->INDEPENDENT
COGNITION: 0 - NO COGNITION ISSUES REPORTED
DRESS: 0-->INDEPENDENT
RETIRED_COMMUNICATION: 0-->UNDERSTANDS/COMMUNICATES WITHOUT DIFFICULTY
TOILETING: 0-->INDEPENDENT

## 2017-09-21 ASSESSMENT — ENCOUNTER SYMPTOMS
NERVOUS/ANXIOUS: 1
AGITATION: 1
SHORTNESS OF BREATH: 0
ABDOMINAL PAIN: 0
FEVER: 0
HALLUCINATIONS: 1

## 2017-09-21 NOTE — ED PROVIDER NOTES
History     Chief Complaint   Patient presents with     Homicidal     thoughts of strangling others     The history is provided by the patient, medical records and a friend.     Nay Lainez is a 26 year old male to female transgender who comes in due to her having feeling that she might kill someone.  She has had thoughts of strangling strangers.  There is no one in particular that she wants to hurt.  The thoughts are there chronically but the last 2 days they have become intense and she is not sure she can be safe.  She was last in the hospital a month ago.  She is a poor historian as evidenced by her thinking she is taking 400 mg of ativan a day and 800 mg of risperdal.  Neither of these are possible.  It is difficult to know if this is a general disorganization or lack of knowledge (or both).  She is tired of having these thoughts.  She hears screams and yelling but no one is there.  She denies command hallucinations.  She has passive suicidal thoughts but no plan or intent.  She is using daily marijuana but denies other drugs.      Please see the 's assessment in [facility] from today for further details.    I have reviewed the Medications, Allergies, Past Medical and Surgical History, and Social History in the Epic system.    Review of Systems   Constitutional: Negative for fever.   Respiratory: Negative for shortness of breath.    Cardiovascular: Negative for chest pain.   Gastrointestinal: Negative for abdominal pain.   Psychiatric/Behavioral: Positive for agitation, hallucinations and suicidal ideas (passive). Negative for self-injury. The patient is nervous/anxious.    All other systems reviewed and are negative.      Physical Exam   BP: 99/71  Pulse: 78  Temp: 97.4  F (36.3  C)  Resp: 16  Weight: 68.2 kg (150 lb 5 oz)  SpO2: 97 %  Physical Exam   Constitutional: He is oriented to person, place, and time. He appears well-developed and well-nourished.   HENT:   Head: Normocephalic and atraumatic.    Mouth/Throat: Oropharynx is clear and moist. No oropharyngeal exudate.   Eyes: Pupils are equal, round, and reactive to light.   Neck: Normal range of motion. Neck supple.   Cardiovascular: Normal rate, regular rhythm and normal heart sounds.    Pulmonary/Chest: Effort normal and breath sounds normal. No respiratory distress.   Abdominal: Soft. Bowel sounds are normal. There is no tenderness.   Musculoskeletal: Normal range of motion.   Neurological: He is alert and oriented to person, place, and time.   Skin: Skin is warm. No rash noted.   Psychiatric: His speech is normal. His mood appears anxious. He is actively hallucinating. Thought content is not paranoid and not delusional. Cognition and memory are normal. He expresses inappropriate judgment. He expresses homicidal and suicidal ideation. He expresses no suicidal plans and no homicidal plans.   Nay is a 25 y/o male to female transgender who looks her age.  She is well groomed with good eye contact.   Nursing note and vitals reviewed.      ED Course     ED Course     Procedures               Labs Ordered and Resulted from Time of ED Arrival Up to the Time of Departure from the ED - No data to display         Assessments & Plan (with Medical Decision Making)   Nay will be admitted to the hospital due to her worsening symptoms of homicidal thoughts and passive suicidal thoughts to the point of not being able to be safe.  She will go to station 30 under Dr. Hanna.    I have reviewed the nursing notes.    I have reviewed the findings, diagnosis, plan and need for follow up with the patient.    New Prescriptions    No medications on file       Final diagnoses:   Schizoaffective disorder, unspecified type (H)       9/21/2017   [facility], [place], EMERGENCY DEPARTMENT     Manny Singer MD  09/21/17 [id number]

## 2017-09-21 NOTE — IP AVS SNAPSHOT
[address]    [address]    [place] [address]    Phone:  [phone]                                       After Visit Summary   9/21/2017    Nay Lainez    MRN: [id number]           After Visit Summary Signature Page     I have received my discharge instructions, and my questions have been answered. I have discussed any challenges I see with this plan with the nurse or doctor.    ..........................................................................................................................................  Patient/Patient Representative Signature      ..........................................................................................................................................  Patient Representative Print Name and Relationship to Patient    ..................................................               ................................................  Date                                            Time    ..........................................................................................................................................  Reviewed by Signature/Title    ...................................................              ..............................................  Date                                                            Time

## 2017-09-21 NOTE — IP AVS SNAPSHOT
MRN:[id number]                      After Visit Summary   9/21/2017    Nay Lainez    MRN: [id number]           Thank you!     Thank you for choosing [facility] for your care. Our goal is always to provide you with excellent care.        Patient Information     Date Of Birth          1990        About your hospital stay     You were admitted on:  September 21, 2017 You last received care in the:  [facility]    You were discharged on:  September 25, 2017       Who to Call     For medical emergencies, please call 911.  For non-urgent questions about your medical care, please call your primary care provider or clinic, [phone]          Attending Provider     Provider Specialty    Manny Singer MD Emergency Medicine    [place], Scott Hodge MD Psychiatry       Primary Care Provider Office Phone # Fax #    Ventura Morelos [phone]      Further instructions from your care team        Behavioral Discharge Planning and Instructions      Summary:  You were admitted on 9/21/2017  due to Depression and homicidal ideations, auditory hallucinations.  You were treated by Dorothea Peter NP and discharged on 9/25/17 from [facility] Home.      Principal Diagnosis:     Schizoaffective disorder, bipolar type.    Cannabis use disorder.  Borderline personality disorder.      Health Care Follow-up Appointments:     Psych NP -  Leatha Trujillo  Follow Appointment on 10/20/17 Friday @ 9:20am  Intake appointment on Friday 9/29/17 with Ness Haskins @ 12:00  [facility]   [address] [place] [address] [address]  [phone] fax: [phone]     Primary Care Physician: Dr. Ventura Morelos     Attend all scheduled appointments with your outpatient providers. Call at least 24 hours in advance if you need to reschedule an appointment to ensure continued access to your outpatient providers.   Major Treatments, Procedures and Findings:  You were provided with: a psychiatric  "assessment, assessed for medical stability, medication evaluation and/or management and milieu management    Symptoms to Report: feeling more aggressive, mood getting worse, thoughts of suicide or thoughts of homicide, substance abuse.    Early warning signs can include: increased depression or anxiety sleep disturbances increased thoughts or behaviors of suicide or self-harm     Safety and Wellness:  Take all medicines as directed.  Make no changes unless your doctor suggests them.      Follow treatment recommendations.  Refrain from alcohol and non-prescribed drugs.  If there is a concern for safety, call 911.    Resources:   Crisis Intervention: 174.596.1618 or 400-371-6051 (TTY: 309.488.5217).  Call anytime for help.  National [place] on Mental Illness (www.[place].monse.org): 451.905.6752 or 036-570-5738.  Alcoholics Anonymous (www.alcoholics-anonymous.org): Check your phone book for your local chapter.  Suicide Awareness Voices of Education (SAVE) (www.save.org): 944-511-PKXY (6619)  National Suicide Prevention Line (www.mentalhealthmn.org): 475-899-JLTV (2036)  Mental Health Consumer/Survivor Network of [place] (www.mhcsn.net): 414.683.5433 or 895-176-7616  Mental Health Association of [place] (www.mentalhealth.org): 818.837.2581 or 147-817-5579  Self- Management and Recovery Training., [employer]-- Toll free: 357.299.8602  www.Experts 911.org  [facility] Crisis (COPE) Response - Adult 634 382-8894  Text 4 Life: txt \"LIFE\" to 43652 for immediate support and crisis intervention  Crisis text line: Text \"START\" to 620-420. Free, confidential, 24/7.  Crisis Intervention: 166.905.2389 or 814-007-2000. Call anytime for help.     The treatment team has appreciated the opportunity to work with you.     If you have any questions or concerns our unit number is [phone]  You may be receiving a follow-up phone call within the next three days from a representative from behavioral health.    You can be reached at " "[phone].          Pending Results     No orders found from 2017 to 2017.            Admission Information     Date & Time Provider Department Dept. Phone    2017 Scott Hanna MD [facility] [phone]      Your Vitals Were     Blood Pressure Pulse Temperature Respirations Weight Pulse Oximetry    103/72 90 97.5  F (36.4  C) (Oral) 17 69.4 kg (153 lb) 99%    BMI (Body Mass Index)                   20.75 kg/m2           [employer] Information     [employer] lets you send messages to your doctor, view your test results, renew your prescriptions, schedule appointments and more. To sign up, go to [email or web address] . Click on \"Log in\" on the left side of the screen, which will take you to the Welcome page. Then click on \"Sign up Now\" on the right side of the page.     You will be asked to enter the access code listed below, as well as some personal information. Please follow the directions to create your username and password.     Your access code is: [email or web address]  Expires: 2017 10:37 AM     Your access code will  in 90 days. If you need help or a new code, please call your [facility] clinic or [phone].        Care EveryWhere ID     This is your Care EveryWhere ID. This could be used by other organizations to access your [facility] medical records  [id number]        Equal Access to Services     [facility] VINNY : Hadii kimberly mercedes hadtimuro Sotangelaali, waaxda luqadaha, qaybta kaalmada adeegyada, denver mcdonald . So [facility] [phone].    ATENCIÓN: Si habla español, tiene a knight disposición servicios gratuitos de asistencia lingüística. Llame al [phone].    We comply with applicable federal civil rights laws and [place] laws. We do not discriminate on the basis of race, color, national origin, age, disability sex, sexual orientation or gender identity.               Review of your medicines      START taking        Dose / Directions    * QUEtiapine 300 MG tablet "   Commonly known as:  SEROquel        Dose:  300 mg   Take 1 tablet (300 mg) by mouth At Bedtime   Quantity:  30 tablet   Refills:  2       * QUEtiapine 50 MG tablet   Commonly known as:  SEROquel        Dose:  50 mg   Take 1 tablet (50 mg) by mouth every 4 hours as needed (agitation)   Quantity:  90 tablet   Refills:  2       * Notice:  This list has 2 medication(s) that are the same as other medications prescribed for you. Read the directions carefully, and ask your doctor or other care provider to review them with you.      CONTINUE these medicines which may have CHANGED, or have new prescriptions. If we are uncertain of the size of tablets/capsules you have at home, strength may be listed as something that might have changed.        Dose / Directions    lithium 300 MG CR tablet   Commonly known as:  ESKALITH/LITHOBID   This may have changed:  how much to take        Dose:  600 mg   Take 2 tablets (600 mg) by mouth At Bedtime   Refills:  0       LORazepam 2 MG tablet   Commonly known as:  ATIVAN   This may have changed:    - medication strength  - how much to take  - when to take this        Dose:  2 mg   Take 1 tablet (2 mg) by mouth 2 times daily   Refills:  0         CONTINUE these medicines which have NOT CHANGED        Dose / Directions    ACYCLOVIR PO        Dose:  400 mg   Take 400 mg by mouth 2 times daily   Refills:  0       clonazePAM 1 MG tablet   Commonly known as:  klonoPIN        Dose:  1 mg   Take 1 mg by mouth At Bedtime   Refills:  0       estradiol valerate 20 MG/ML injection   Commonly known as:  DELESTROGEN        Dose:  2 mg   Inject 2 mg into the muscle every 7 days Takes on Sundays   Refills:  0       FLUoxetine 10 MG capsule   Commonly known as:  PROzac        Dose:  20 mg   Take 20 mg by mouth daily   Refills:  0       fluticasone 50 MCG/ACT spray   Commonly known as:  FLONASE        Dose:  2 spray   Spray 2 sprays into both nostrils every evening   Refills:  0       loratadine 10 MG  tablet   Commonly known as:  CLARITIN        Dose:  10 mg   Take 10 mg by mouth At Bedtime   Refills:  0       RISPERDAL PO        Dose:  4 mg   Take 4 mg by mouth 2 times daily   Refills:  0       TRUVADA 200-300 MG per tablet   Indication:  PrEP   Generic drug:  emtricitabine-tenofovir        Dose:  1 tablet   Take 1 tablet by mouth daily   Refills:  0            Where to get your medicines      Some of these will need a paper prescription and others can be bought over the counter. Ask your nurse if you have questions.     Bring a paper prescription for each of these medications     QUEtiapine 300 MG tablet    QUEtiapine 50 MG tablet                Protect others around you: Learn how to safely use, store and throw away your medicines at www.disposemymeds.org.             Medication List: This is a list of all your medications and when to take them. Check marks below indicate your daily home schedule. Keep this list as a reference.      Medications           Morning Afternoon Evening Bedtime As Needed    ACYCLOVIR PO   Take 400 mg by mouth 2 times daily   Last time this was given:  400 mg on 9/25/2017  8:27 AM                                clonazePAM 1 MG tablet   Commonly known as:  klonoPIN   Take 1 mg by mouth At Bedtime   Last time this was given:  1 mg on 9/24/2017  8:36 PM                                estradiol valerate 20 MG/ML injection   Commonly known as:  DELESTROGEN   Inject 2 mg into the muscle every 7 days Takes on Sundays   Last time this was given:  2 mg on 9/24/2017 11:49 AM                                FLUoxetine 10 MG capsule   Commonly known as:  PROzac   Take 20 mg by mouth daily   Last time this was given:  20 mg on 9/25/2017  8:27 AM                                fluticasone 50 MCG/ACT spray   Commonly known as:  FLONASE   Spray 2 sprays into both nostrils every evening   Last time this was given:  2 sprays on 9/24/2017  8:38 PM                                lithium 300 MG CR tablet    Commonly known as:  ESKALITH/LITHOBID   Take 2 tablets (600 mg) by mouth At Bedtime   Last time this was given:  600 mg on 9/24/2017  8:34 PM                                loratadine 10 MG tablet   Commonly known as:  CLARITIN   Take 10 mg by mouth At Bedtime   Last time this was given:  10 mg on 9/24/2017  8:36 PM                                LORazepam 2 MG tablet   Commonly known as:  ATIVAN   Take 1 tablet (2 mg) by mouth 2 times daily   Last time this was given:  2 mg on 9/25/2017  8:27 AM                                * QUEtiapine 300 MG tablet   Commonly known as:  SEROquel   Take 1 tablet (300 mg) by mouth At Bedtime   Last time this was given:  200 mg on 9/24/2017  8:35 PM                                * QUEtiapine 50 MG tablet   Commonly known as:  SEROquel   Take 1 tablet (50 mg) by mouth every 4 hours as needed (agitation)   Last time this was given:  200 mg on 9/24/2017  8:35 PM                                RISPERDAL PO   Take 4 mg by mouth 2 times daily   Last time this was given:  4 mg on 9/25/2017  8:27 AM                                TRUVADA 200-300 MG per tablet   Take 1 tablet by mouth daily   Last time this was given:  1 tablet on 9/25/2017  8:27 AM   Generic drug:  emtricitabine-tenofovir                                * Notice:  This list has 2 medication(s) that are the same as other medications prescribed for you. Read the directions carefully, and ask your doctor or other care provider to review them with you.

## 2017-09-22 PROCEDURE — 99223 1ST HOSP IP/OBS HIGH 75: CPT | Mod: AI | Performed by: NURSE PRACTITIONER

## 2017-09-22 PROCEDURE — 12400007 ZZH R&B MH INTERMEDIATE UMMC

## 2017-09-22 PROCEDURE — 25000132 ZZH RX MED GY IP 250 OP 250 PS 637: Performed by: NURSE PRACTITIONER

## 2017-09-22 PROCEDURE — 25000132 ZZH RX MED GY IP 250 OP 250 PS 637: Performed by: PSYCHIATRY & NEUROLOGY

## 2017-09-22 RX ORDER — QUETIAPINE FUMARATE 200 MG/1
200 TABLET, FILM COATED ORAL AT BEDTIME
Status: DISCONTINUED | OUTPATIENT
Start: 2017-09-22 | End: 2017-09-25 | Stop reason: HOSPADM

## 2017-09-22 RX ORDER — ALUMINA, MAGNESIA, AND SIMETHICONE 2400; 2400; 240 MG/30ML; MG/30ML; MG/30ML
30 SUSPENSION ORAL EVERY 4 HOURS PRN
Status: DISCONTINUED | OUTPATIENT
Start: 2017-09-22 | End: 2017-09-25 | Stop reason: HOSPADM

## 2017-09-22 RX ORDER — ESTRADIOL VALERATE 20 MG/ML
2 INJECTION INTRAMUSCULAR
Status: DISCONTINUED | OUTPATIENT
Start: 2017-09-24 | End: 2017-09-25 | Stop reason: HOSPADM

## 2017-09-22 RX ORDER — ACETAMINOPHEN 325 MG/1
650 TABLET ORAL EVERY 4 HOURS PRN
Status: DISCONTINUED | OUTPATIENT
Start: 2017-09-22 | End: 2017-09-25 | Stop reason: HOSPADM

## 2017-09-22 RX ORDER — POLYETHYLENE GLYCOL 3350 17 G
4-8 POWDER IN PACKET (EA) ORAL
Status: DISCONTINUED | OUTPATIENT
Start: 2017-09-22 | End: 2017-09-25 | Stop reason: HOSPADM

## 2017-09-22 RX ORDER — ACYCLOVIR 200 MG/1
400 CAPSULE ORAL 2 TIMES DAILY
Status: DISCONTINUED | OUTPATIENT
Start: 2017-09-22 | End: 2017-09-25 | Stop reason: HOSPADM

## 2017-09-22 RX ORDER — QUETIAPINE FUMARATE 25 MG/1
25-50 TABLET, FILM COATED ORAL EVERY 4 HOURS PRN
Status: DISCONTINUED | OUTPATIENT
Start: 2017-09-22 | End: 2017-09-25 | Stop reason: HOSPADM

## 2017-09-22 RX ORDER — LORAZEPAM 1 MG/1
2 TABLET ORAL 2 TIMES DAILY
Status: DISCONTINUED | OUTPATIENT
Start: 2017-09-22 | End: 2017-09-25 | Stop reason: HOSPADM

## 2017-09-22 RX ADMIN — LITHIUM CARBONATE 600 MG: 300 TABLET, EXTENDED RELEASE ORAL at 21:19

## 2017-09-22 RX ADMIN — CLONAZEPAM 1 MG: 1 TABLET ORAL at 21:18

## 2017-09-22 RX ADMIN — NICOTINE POLACRILEX 4 MG: 2 LOZENGE ORAL at 16:35

## 2017-09-22 RX ADMIN — LORATADINE 10 MG: 10 TABLET ORAL at 22:50

## 2017-09-22 RX ADMIN — FLUOXETINE 20 MG: 20 CAPSULE ORAL at 09:33

## 2017-09-22 RX ADMIN — RISPERIDONE 4 MG: 2 TABLET ORAL at 21:18

## 2017-09-22 RX ADMIN — EMTRICITABINE AND TENOFOVIR DISOPROXIL FUMARATE 1 TABLET: 200; 300 TABLET, FILM COATED ORAL at 09:33

## 2017-09-22 RX ADMIN — RISPERIDONE 4 MG: 2 TABLET ORAL at 09:33

## 2017-09-22 RX ADMIN — QUETIAPINE FUMARATE 50 MG: 25 TABLET ORAL at 16:35

## 2017-09-22 RX ADMIN — NICOTINE POLACRILEX 4 MG: 2 LOZENGE ORAL at 18:07

## 2017-09-22 RX ADMIN — FLUTICASONE PROPIONATE 2 SPRAY: 50 SPRAY, METERED NASAL at 21:20

## 2017-09-22 RX ADMIN — LORAZEPAM 2 MG: 1 TABLET ORAL at 09:33

## 2017-09-22 RX ADMIN — LORAZEPAM 2 MG: 1 TABLET ORAL at 21:18

## 2017-09-22 RX ADMIN — ACYCLOVIR 400 MG: 200 CAPSULE ORAL at 21:17

## 2017-09-22 RX ADMIN — NICOTINE POLACRILEX 8 MG: 2 LOZENGE ORAL at 12:13

## 2017-09-22 RX ADMIN — ACYCLOVIR 400 MG: 200 CAPSULE ORAL at 11:16

## 2017-09-22 RX ADMIN — NICOTINE POLACRILEX 8 MG: 4 GUM, CHEWING ORAL at 11:16

## 2017-09-22 RX ADMIN — QUETIAPINE FUMARATE 200 MG: 200 TABLET, FILM COATED ORAL at 22:52

## 2017-09-22 ASSESSMENT — ACTIVITIES OF DAILY LIVING (ADL)
GROOMING: INDEPENDENT
DRESS: INDEPENDENT
LAUNDRY: WITH SUPERVISION
DRESS: INDEPENDENT
ORAL_HYGIENE: INDEPENDENT
ORAL_HYGIENE: INDEPENDENT
GROOMING: INDEPENDENT
GROOMING: INDEPENDENT
LAUNDRY: WITH SUPERVISION
DRESS: INDEPENDENT;STREET CLOTHES
ORAL_HYGIENE: INDEPENDENT
LAUNDRY: UNABLE TO COMPLETE

## 2017-09-22 NOTE — PROGRESS NOTES
"  INITIAL PSYCHOSOCIAL ASSESSMENT AND NOTE  I have reviewed the chart met with the patient, and developed Care Plan.  Information for assessment was obtained from: review of EPIC and interview with pt.      PRESENTING PROBLEM: Per notes: \"Pt is  is a 26-year-old male to female trangender individual admitted to [facility] Station 32N on 9/21/2017.  She was admitted as a voluntary patient through the ER due to homicidal ideation.  She has chronic HI but for 2 days prior to admission her thoughts were more intense and she was unsure whether she could maintain safety.  She does not have a specific target in mind.  She has passive SI.  She has been having paranoia and auditory and visual hallucinations.  She was using marijuana daily prior to admission.  She was on [facility] in August and says the homicidal ideation and hallucinations worsened about a week after her discharge.  She is unable to identify any stressors.  She has been taking her meds as prescribed.\"  The following areas have been assessed:  History of Mental Health and Chemical Dependency:   Pt was hospitalized at [facility] in July 2016,  March 2017, May 2017 and August 2017.  She has also been hospitalized at [facility] x3 and [facility] x1 in the past.  She has a history of day treatment at [facility].  She says it was not helpful.  She has been in therapy in the past and it wasn't helpful.  In the past she was in DBT and \"I thought it was stupid.\"  Past medications include Navane, Haldol (akathisia), Seroquel, Zyprexa, Xanax, Naltrexone, Neurontin, Wellbutrin, Thorazine and Celexa.  She has a history of anorexia nervosa.  No history of ECT.  No history of commitment.  No history of suicide attempts.  She has a history of SIB by cutting, most recently several weeks ago.  No history of aggressive behavior towards others.    Living Situation: lives with her male spouse of three years in an apartment in [place].  Significant Life Events (Illness, Abuse, " "Trauma, Death): denies hx of childhood abuse.  Pt has apparently worked in the escort service.    Family Description (Constellation, Family Psychiatric History): She grew up in [place], [place], raised by her parents.  Her childhood was \"fine.\"  She has one sister  Financial Status: appears stable.    Occupational History:  In the past she was employed in retail and as a .  She used to be employed as an escort.  She has been unemployed for the last year.   Educational Background: high school graduate     Service History: none  Legal Issues: denies  Ethnic/Cultural Issues: transgender male to female.    Spiritual Orientation: none  Social Functioning (organizations, interests): \"I like to take my pet rats to the park\". Pt has seven pet rats.  Current Treatment providers:   Psych NP -  Leatha Trujillo  [facility]   [address] [place] [address]  [phone]     Primary Care Physician: Dr. Ventura Morelos     No current therapist.  Pt did not find therapy helpful.    Social Service Assessment/Plan:   While on the unit, pt will be provided with psychiatry, milieu management, unit programming, CTC to arrange clinically appropriate discharge planning.  At this point, pt offers that she will return to see her psych NP but is not interested in therapy or DBT, nor day treatment.        "

## 2017-09-22 NOTE — H&P
"History and Physical    Nay Lainez MRN# [id number]   Age: 26 year old YOB: 1990     Date of Admission:  9/21/2017          Contacts:     Psych CNS -  Leatha Trujillo  [facility]   [address] [place] [address]  [phone]    Primary Care Physician: Dr. Ventura Morelos    No current therapist.         Diagnoses:     Schizoaffective disorder, bipolar type.    Cannabis use disorder.  Borderline personality disorder.  Male to female transgender.         Recommendations:     Admit to Unit: 32N.    Attending Physician: Dr. Hanna, under the direct care of Dorothea Peter NP.    Patient is voluntary.    Routine lab studies have been requested.    Monitor for target symptoms.     Provide a safe environment and therapeutic milieu.     Medications:  [place] Prescription Monitoring reviewed.  Continue Risperdal 4mg BID.  Continue Ativan 2mg BID.  Continue Klonopin 1mg at HS.  Continue Prozac 20mg daily.  Continue Lithium 600mg at HS.  Add Seroquel 200mg at HS.  Add Seroquel 25-50mg PO q 4 hours PRN.      Discharge to home when stable.  She has outpatient psychiatry and a PCP.  She declines therapy or any other resources.        Attestation:  Patient has been seen and evaluated by me,  Tyra Peter, APRN CNP  The patient was counseled on  nature of illness and treatment plan/options  Care was coordinated with  tx team         Chief Complaint:     History is obtained from the patient and electronic health record.    \"Two nights ago I didn't sleep at all.  All I could think about was killing people, and then it continued on into yesterday and I just couldn't take it anymore.\"           History of Present Illness:          Nay Lainez is a 26-year-old male to female trangender individual admitted to [facility]N on 9/21/2017.  She was admitted as a voluntary patient through the ER due to homicidal ideation.  She has chronic HI but for 2 days prior to admission her thoughts were more " "intense and she was unsure whether she could maintain safety.  She does not have a specific target in mind.  She has passive SI.  She has been having paranoia and auditory and visual hallucinations.  She was using marijuana daily prior to admission.  She was on [facility] in August and says the homicidal ideation and hallucinations worsened about a week after her discharge.  She is unable to identify any stressors.  She has been taking her meds as prescribed.           Psychiatric Review of Systems:      She has homicidal ideation with a plan to strangle someone.  She has no particular target in mind.  She recently had paranoid thoughts that others would kill her and she hid knives in her apartment.  She has been experiencing auditory hallucinations that are yelling at each other and she cannot decipher the words.  She has visual hallucinations of bugs.  When she shuts her eyes she reports seeing death and visions of herself killing others.   She has been sleeping 8 hours per night, sometimes less.  Mood has been depressed.  She endorses passive suicidal thoughts with no intent or plan.  She endorses lower interest.  Appetite has been good.  Energy has been low.  Motivation is low.  Concentration is poor.  She has feelings of hopelessness, helplessness and worthlessness.  She cries \"all the time.\"  She reports high anxiety.  She denies irritability.  She denies feeling angry.  She has some racing thoughts.  She denies impulsivity.  She denies panic attacks.  She denies symptoms consistent with PTSD.  She denies current eating disorder symptoms.  She says she eats a lot and has gained 15# this year.  She denies symptoms consistent with OCD.            Medical Review of Systems:     A 10-point review of systems was completed and is negative with the exception of HPI.             Psychiatric History:     She was hospitalized at [facility] in July 2016,  March 2017, May 2017 and August 2017.  She has also been " "hospitalized at [facility] x3 and [facility] x1 in the past.  She has a history of day treatment at [facility].  She says it was not helpful.  She has been in therapy in the past and it wasn't helpful.  In the past she was in DBT and \"I thought it was stupid.\"  Past medications include Navane, Haldol (akathisia), Seroquel, Zyprexa, Xanax, Naltrexone, Neurontin, Wellbutrin, Thorazine and Celexa.  She has a history of anorexia nervosa.  No history of ECT.  No history of commitment.  No history of suicide attempts.  She has a history of SIB by cutting, most recently several weeks ago.  No history of aggressive behavior towards others.           Substance Use History:     She smokes 1 pack of cigarettes daily.  She smokes marijuana regularly.   She occasionally drinks alcohol.  She went to treatment at [facility] in 7/2016.           Past Medical History:     Male to female transgender   Seasonal allergies  Medication prophylaxis for HSV  Medication prophylaxis for HIV    No history of seizures of head injuries.             Past Surgical History:     Breast augmentation  Cosmetic surgery - lips and nose           Allergies:      No Known Allergies           Medications:       Medication Sig     RisperiDONE (RISPERDAL PO) Take 4 mg by mouth 2 times daily     LORazepam (ATIVAN) 1 MG tablet Take 2 mg by mouth 2 times daily     clonazePAM (KLONOPIN) 1 MG tablet Take 1 mg by mouth At Bedtime      FLUoxetine (PROZAC) 10 MG capsule Take 20 mg by mouth daily      emtricitabine-tenofovir (TRUVADA) 200-300 MG per tablet Take 1 tablet by mouth daily      ACYCLOVIR PO Take 400 mg by mouth 2 times daily      fluticasone (FLONASE) 50 MCG/ACT nasal spray Spray 2 sprays into both nostrils every evening      loratadine (CLARITIN) 10 MG tablet Take 10 mg by mouth At Bedtime      estradiol valerate (DELESTROGEN) 20 MG/ML injection Inject 2 mg into the muscle every 7 days Takes on Sundays      lithium (ESKALITH/LITHOBID) 300 MG CR tablet Take 2 " "tablets (600 mg) by mouth At Bedtime             Social History:     She grew up in [place], [place], raised by her parents.  Her childhood was \"fine.\"  She denies any history of abuse.  She has 1 sister.  She graduated high school and completed some coursework at [employer].  In the past she was employed in retail and as a .  She used to be employed as an escort.  She has been unemployed for the last year.  She has been  to her  for 3 years.  She says their relationship is \"fantastic.\"  She lives in an apartment in [place] with her .  Denies legal issues.            Family History:     Her maternal grandfather, mother and sister have a history of alcohol use disorder.  No family history of mental health diagnoses.  No family history of suicide.           Labs:        Ref. Range 9/21/2017 14:30   Amphetamine Qual Urine Latest Ref Range: NEG^Negative  Negative   Cocaine Qual Urine Latest Ref Range: NEG^Negative  Negative   Opiates Qualitative Urine Latest Ref Range: NEG^Negative  Negative   Cannabinoids Qual Urine Latest Ref Range: NEG^Negative  Positive (A)   Barbiturates Qual Urine Latest Ref Range: NEG^Negative  Negative   Benzodiazepine Qual Urine Latest Ref Range: NEG^Negative  Negative   Ethanol Qual Urine Latest Ref Range: NEG^Negative  Negative       /72  Pulse 71  Temp 97.4  F (36.3  C) (Oral)  Resp 16  Wt 68.2 kg (150 lb 5 oz)  SpO2 99%  BMI 20.39 kg/m2  Weight is 150 lbs 5 oz  Body mass index is 20.39 kg/(m^2).         Psychiatric Examination:     Appearance:  awake, somnolent, adequate grooming, multiple tattoos  Attitude:  cooperative  Eye Contact:  minimal  Mood:  depressed, anxious  Affect:  blunted  Speech:  clear, coherent  Psychomotor Behavior:  no evidence of tardive dyskinesia, dystonia, or tics  Thought Process:  linear and goal oriented  Associations:  no loose associations  Thought Content:  endorses passive SI without intent or plan, endorses " HI with no particular target, endorses auditory hallucinations of screaming  Insight:  limited  Judgment:  limited  Oriented to: date, time, person, and place  Attention Span and Concentration:  fair  Recent and Remote Memory:  fair  Language: Able to name objects, Able to repeat phrases and Able to read and write  Fund of Knowledge: appropriate  Muscle Strength and Tone: normal  Gait and Station: Normal         Physical Exam:     Please refer to the exam completed by Dr. Singer in the ER 9/21/2017.

## 2017-09-22 NOTE — PLAN OF CARE
Problem: Patient Care Overview  Goal: Team Discussion  Team Plan:   BEHAVIORAL TEAM DISCUSSION     Participants: Dorothea Peter CNP, Laura Medina CTC, Lexi bullock RN  Progress: new admit. admitted as a voluntary patient through the ER due to homicidal ideation.  Continued Stay Criteria/Rationale: pt will need further assessment and stabilization  Medical/Physical: see chart  Precautions:   Behavioral Orders   Procedures     Assault precautions     Code 1 - Restrict to Unit     Routine Programming       As clinically indicated     Status 15       Every 15 minutes.     Plan: assess, monitor and stabilize  Rationale for change in precautions or plan: new admit

## 2017-09-22 NOTE — PROGRESS NOTES
09/21/17 2765   Patient Belongings   Did you bring any home meds/supplements to the hospital?  Yes   Disposition of meds  Other (see comment)   Patient Belongings cell phone/electronics;clothing;keys;tote;purse;glasses;wallet   Disposition of Belongings In Locker labeled Nay   Belongings Search Yes   Clothing Search Yes   Second Staff Alise BOB   In locker Labeled Nay.  victorias secret tote. 3 pairs of jeans, leggings, underwear socks. Two sweaters, one turtle neck, t shirt, 1 skirt. One long tunic over shawl. One make up bag small, 1 cellphone, 1 glasses case (note white glasses on patient body) one wallet, id, one set of keys, 1 purse, 1 phone .     NOTE 1 bottle of med given to nurse due to high dose.     1 [facility] check card sent to security.   ADMISSION:  I am responsible for any personal items that are not sent to the safe or pharmacy. [facility] is not responsible for loss, theft or damage of any property in my possession.  Patient Signature _____________________ Date/Time _____________________  Staff Signature _______________________ Date/Time _____________________  2nd Staff person, if patient is unable/unwilling to sign  ___________________________________ Date/Time _____________________  DISCHARGE:  All personal items have been returned to me.  Patient Signature _____________________ Date/Time _____________________  Staff Signature _______________________ Date/Time _____________________

## 2017-09-22 NOTE — PROGRESS NOTES
09/22/17 1400   General Information   Has Not Attended OT as of: 09/22/17   General Observation/Plan   General Observations/Plan See Comments     O.T. NON ATTENDANCE:Has not attended yet.Will encourage participation and evaluate function upon attending. Patient will be given a Self Assessment form. OT staff will explain the value of including them in their treatment plan and offer options to meet their needs and identified goals.

## 2017-09-22 NOTE — PLAN OF CARE
Problem: Patient Care Overview  Goal: Plan of Care/Patient Progress Review  The patient completed the reasons for admit and goals for discharge in the personal plan of care.   Reasons for admit:  1)  Because I didn't sleep at all Wednesday night  2)  Homicidal thoughts        Goals for discharge:  1)  Medication adjusted

## 2017-09-22 NOTE — PROGRESS NOTES
09/22/17 1502   Behavioral Health   Hallucinations auditory;visual   Thinking delusional;poor concentration   Orientation person: oriented;place: oriented   Memory baseline memory   Insight poor   Judgement impaired   Eye Contact at examiner   Affect blunted, flat   Mood mood is calm   Physical Appearance/Attire attire appropriate to age and situation   Hygiene other (see comment)  (adequete)   Suicidality other (see comments)  (denies)   Self Injury other (see comment)  (thoughts to hurt others)   Activity isolative;withdrawn   Speech clear;coherent   Medication Sensitivity no stated side effects;no observed side effects   Psychomotor / Gait balanced;steady   Activities of Daily Living   Hygiene/Grooming independent   Oral Hygiene independent   Dress independent;street clothes   Laundry unable to complete   Room Organization independent       Pt had a isolative shift. Pt only came out for meals. Pt said she was tired and didn't sleep well on Wednesday 9/20. Pt said she didn't have thoughts of hurting herself but had small urges to hurt others. Pt was pleasant to staff and said she wasn't anxious or depressed.

## 2017-09-22 NOTE — PROGRESS NOTES
"Pt.was admitted from [facility] after endorsing worsening homicidal ideation. Pt.stated she was unable to sleep for \"a couple of days. All I could think of was killing people\". Pt.denied having any particular plan or person in mind. She is jah for safety of staff and peers while inpatient. Placed on assault precaution. She denied any suicide or self injury thoughts.  Room blocked for continued homicidal thoughts with auditory hallucination. 15-minutes safety check initiated. Utox positive for cannabis. Pt.went to sleep after signing in herself and answering a few admission questions. Denied any elopement history. Has not had a flu vaccine this season. However, she refused to get on while inpatient. Will continue to monitor.   "

## 2017-09-23 PROCEDURE — 25000132 ZZH RX MED GY IP 250 OP 250 PS 637: Performed by: NURSE PRACTITIONER

## 2017-09-23 PROCEDURE — 25000132 ZZH RX MED GY IP 250 OP 250 PS 637: Performed by: PSYCHIATRY & NEUROLOGY

## 2017-09-23 PROCEDURE — 12400007 ZZH R&B MH INTERMEDIATE UMMC

## 2017-09-23 RX ADMIN — QUETIAPINE FUMARATE 200 MG: 200 TABLET, FILM COATED ORAL at 20:20

## 2017-09-23 RX ADMIN — LITHIUM CARBONATE 600 MG: 300 TABLET, EXTENDED RELEASE ORAL at 20:21

## 2017-09-23 RX ADMIN — CLONAZEPAM 1 MG: 1 TABLET ORAL at 20:21

## 2017-09-23 RX ADMIN — FLUOXETINE 20 MG: 20 CAPSULE ORAL at 08:59

## 2017-09-23 RX ADMIN — ACYCLOVIR 400 MG: 200 CAPSULE ORAL at 20:22

## 2017-09-23 RX ADMIN — FLUTICASONE PROPIONATE 2 SPRAY: 50 SPRAY, METERED NASAL at 20:20

## 2017-09-23 RX ADMIN — ACYCLOVIR 400 MG: 200 CAPSULE ORAL at 08:59

## 2017-09-23 RX ADMIN — NICOTINE POLACRILEX 4 MG: 2 LOZENGE ORAL at 09:03

## 2017-09-23 RX ADMIN — LORATADINE 10 MG: 10 TABLET ORAL at 20:22

## 2017-09-23 RX ADMIN — QUETIAPINE FUMARATE 25 MG: 25 TABLET ORAL at 13:08

## 2017-09-23 RX ADMIN — RISPERIDONE 4 MG: 2 TABLET ORAL at 11:15

## 2017-09-23 RX ADMIN — NICOTINE POLACRILEX 4 MG: 2 LOZENGE ORAL at 17:17

## 2017-09-23 RX ADMIN — NICOTINE POLACRILEX 4 MG: 2 LOZENGE ORAL at 20:19

## 2017-09-23 RX ADMIN — NICOTINE POLACRILEX 4 MG: 2 LOZENGE ORAL at 19:01

## 2017-09-23 RX ADMIN — LORAZEPAM 2 MG: 1 TABLET ORAL at 19:01

## 2017-09-23 RX ADMIN — RISPERIDONE 4 MG: 2 TABLET ORAL at 20:21

## 2017-09-23 RX ADMIN — NICOTINE POLACRILEX 4 MG: 2 LOZENGE ORAL at 11:08

## 2017-09-23 RX ADMIN — LORAZEPAM 2 MG: 1 TABLET ORAL at 11:15

## 2017-09-23 RX ADMIN — EMTRICITABINE AND TENOFOVIR DISOPROXIL FUMARATE 1 TABLET: 200; 300 TABLET, FILM COATED ORAL at 09:00

## 2017-09-23 RX ADMIN — NICOTINE POLACRILEX 4 MG: 2 LOZENGE ORAL at 13:08

## 2017-09-23 ASSESSMENT — ACTIVITIES OF DAILY LIVING (ADL)
DRESS: INDEPENDENT
GROOMING: INDEPENDENT
ORAL_HYGIENE: INDEPENDENT
DRESS: INDEPENDENT
LAUNDRY: WITH SUPERVISION
ORAL_HYGIENE: INDEPENDENT
LAUNDRY: WITH SUPERVISION
GROOMING: INDEPENDENT

## 2017-09-23 NOTE — PROGRESS NOTES
09/23/17 1100   Behavioral Health   Hallucinations denies / not responding to hallucinations   Thinking poor concentration   Orientation person: oriented;place: oriented;date: oriented;time: oriented   Memory baseline memory   Insight poor   Judgement impaired   Eye Contact at examiner   Affect blunted, flat   Mood mood is calm   Physical Appearance/Attire attire appropriate to age and situation   Hygiene neglected grooming - unclean body, hair, teeth   Activity isolative;withdrawn   Speech clear;coherent   Psychomotor / Gait balanced;steady   Safety   Suicidality status 15   Elopement status 15   Psycho Education   Type of Intervention 1:1 intervention   Response participates with cues/redirection   Hours 0.5   Treatment Detail (check in)   Activities of Daily Living   Hygiene/Grooming independent   Oral Hygiene independent   Dress independent   Laundry with supervision   Room Organization independent   Activity   Activity Assistance Provided independent   Behavioral Health Interventions   Behavioral Disturbance maintain safety precautions;monitor need to revise level of observation;maintain safe secure environment   Social and Therapeutic Interventions (Behavioral Disturbance) encourage socialization with peers;encourage effective boundaries with peers;encourage participation in therapeutic groups and milieu activities   Pt spend this shift isolative and withdrawn into  her room.Pt denies all mental health symptoms.Pt states, her goals for today is to read and rest.Pt came out for all meals.

## 2017-09-23 NOTE — PROGRESS NOTES
Pt denies all symptoms including SI, SIB. Goal for the day was to see  during visiting hours. Calm, isolative during shift.     09/22/17 4972   Behavioral Health   Hallucinations denies / not responding to hallucinations   Thinking poor concentration   Orientation person: oriented;place: oriented   Memory baseline memory   Insight poor   Judgement impaired   Eye Contact at examiner   Affect blunted, flat   Mood mood is calm   Physical Appearance/Attire attire appropriate to age and situation   Hygiene neglected grooming - unclean body, hair, teeth   Suicidality other (see comments)  (denies)   Self Injury other (see comment)  (denies)   Elopement (N/A)   Activity isolative;withdrawn   Speech clear;coherent   Medication Sensitivity no stated side effects;no observed side effects   Psychomotor / Gait balanced;steady   Psycho Education   Type of Intervention 1:1 intervention   Response participates, initiates socially appropriate   Hours 0.5   Treatment Detail check in   Activities of Daily Living   Hygiene/Grooming independent   Oral Hygiene independent   Dress independent   Laundry with supervision   Room Organization independent   Behavioral Health Interventions   Behavioral Disturbance maintain safety precautions;monitor need to revise level of observation;maintain safe secure environment;reality orientation;simple, clear language;decrease environmental stimulation;encourage clear communication of needs;redirection of intrusive behaviors;redirection of aggressive behaviors;encourage nutrition and hydration;encourage participation / independence with adls;provide emotional support;establish therapeutic relationship;provide positive feedback for use of effective coping skills;build upon strengths;monitor need for prn medication;monitor confusion, memory loss, decision making ability and reorient / intervent as needed   Social and Therapeutic Interventions (Behavioral Disturbance) encourage socialization with  peers;encourage effective boundaries with peers;encourage participation in therapeutic groups and milieu activities

## 2017-09-24 PROCEDURE — 25000128 H RX IP 250 OP 636: Performed by: NURSE PRACTITIONER

## 2017-09-24 PROCEDURE — 25000132 ZZH RX MED GY IP 250 OP 250 PS 637: Performed by: PSYCHIATRY & NEUROLOGY

## 2017-09-24 PROCEDURE — 25000132 ZZH RX MED GY IP 250 OP 250 PS 637: Performed by: NURSE PRACTITIONER

## 2017-09-24 PROCEDURE — 12400007 ZZH R&B MH INTERMEDIATE UMMC

## 2017-09-24 RX ADMIN — NICOTINE POLACRILEX 8 MG: 2 LOZENGE ORAL at 21:49

## 2017-09-24 RX ADMIN — LORATADINE 10 MG: 10 TABLET ORAL at 20:36

## 2017-09-24 RX ADMIN — CLONAZEPAM 1 MG: 1 TABLET ORAL at 20:36

## 2017-09-24 RX ADMIN — NICOTINE POLACRILEX 4 MG: 2 LOZENGE ORAL at 13:52

## 2017-09-24 RX ADMIN — RISPERIDONE 4 MG: 2 TABLET ORAL at 20:35

## 2017-09-24 RX ADMIN — ACYCLOVIR 400 MG: 200 CAPSULE ORAL at 20:37

## 2017-09-24 RX ADMIN — ESTRADIOL VALERATE 2 MG: 20 INJECTION, SOLUTION INTRAMUSCULAR at 11:49

## 2017-09-24 RX ADMIN — FLUOXETINE 20 MG: 20 CAPSULE ORAL at 10:05

## 2017-09-24 RX ADMIN — RISPERIDONE 4 MG: 2 TABLET ORAL at 10:04

## 2017-09-24 RX ADMIN — NICOTINE POLACRILEX 4 MG: 2 LOZENGE ORAL at 10:05

## 2017-09-24 RX ADMIN — NICOTINE POLACRILEX 4 MG: 2 LOZENGE ORAL at 15:32

## 2017-09-24 RX ADMIN — FLUTICASONE PROPIONATE 2 SPRAY: 50 SPRAY, METERED NASAL at 20:38

## 2017-09-24 RX ADMIN — QUETIAPINE FUMARATE 200 MG: 200 TABLET, FILM COATED ORAL at 20:35

## 2017-09-24 RX ADMIN — LITHIUM CARBONATE 600 MG: 300 TABLET, EXTENDED RELEASE ORAL at 20:34

## 2017-09-24 RX ADMIN — LORAZEPAM 2 MG: 1 TABLET ORAL at 10:04

## 2017-09-24 RX ADMIN — NICOTINE POLACRILEX 4 MG: 2 LOZENGE ORAL at 19:07

## 2017-09-24 RX ADMIN — LORAZEPAM 2 MG: 1 TABLET ORAL at 20:35

## 2017-09-24 RX ADMIN — ACYCLOVIR 400 MG: 200 CAPSULE ORAL at 10:01

## 2017-09-24 RX ADMIN — EMTRICITABINE AND TENOFOVIR DISOPROXIL FUMARATE 1 TABLET: 200; 300 TABLET, FILM COATED ORAL at 10:02

## 2017-09-24 RX ADMIN — QUETIAPINE FUMARATE 50 MG: 25 TABLET ORAL at 11:57

## 2017-09-24 RX ADMIN — NICOTINE POLACRILEX 4 MG: 2 LOZENGE ORAL at 11:57

## 2017-09-24 ASSESSMENT — ACTIVITIES OF DAILY LIVING (ADL)
ORAL_HYGIENE: INDEPENDENT
GROOMING: INDEPENDENT
DRESS: STREET CLOTHES
DRESS: STREET CLOTHES
LAUNDRY: UNABLE TO COMPLETE
HYGIENE/GROOMING: INDEPENDENT
LAUNDRY: UNABLE TO COMPLETE
ORAL_HYGIENE: INDEPENDENT

## 2017-09-24 NOTE — PROGRESS NOTES
Pt is mostly isolative to her room, and is withdrawn from peers.  Pt denies all mental health symptoms, including HI/SI/SIB.  Pt has a blunted affect but brightens a bit on approach.        09/24/17 1300   Behavioral Health   Hallucinations denies / not responding to hallucinations   Thinking poor concentration   Orientation person: oriented;place: oriented;date: oriented;time: oriented   Memory baseline memory   Insight poor   Judgement impaired   Eye Contact at examiner   Affect blunted, flat   Mood mood is calm   ADL Assessment (WDL) WDL   Suicidality (WDL) WDL   Self Injury (WDL) WDL   Elopement (WDL) WDL   Activity withdrawn;isolative   Speech (WDL) WDL   Psychomotor Gait (WDL) WDL   Psycho Education   Type of Intervention 1:1 intervention   Response participates, initiates socially appropriate   Hours 0.5   Treatment Detail check in   Activities of Daily Living   Hygiene/Grooming independent   Oral Hygiene independent   Dress street clothes   Laundry unable to complete   Room Organization independent

## 2017-09-24 NOTE — PROGRESS NOTES
09/23/17 2300   Behavioral Health   Hallucinations denies / not responding to hallucinations   Thinking poor concentration   Orientation time: oriented;date: oriented;place: oriented;person: oriented   Memory baseline memory   Insight poor   Judgement impaired   Eye Contact at examiner   Affect blunted, flat   Mood mood is calm   Physical Appearance/Attire attire appropriate to age and situation   Hygiene well groomed   Psycho Education   Type of Intervention 1:1 intervention   Response observes from a distance   Activities of Daily Living   Hygiene/Grooming independent   Oral Hygiene independent   Dress independent   Laundry with supervision   Room Organization independent   Activity   Activity Assistance Provided independent   Behavioral Health Interventions   Behavioral Disturbance maintain safety precautions;monitor need to revise level of observation;maintain safe secure environment;reality orientation;simple, clear language;decrease environmental stimulation;assist in development of alternative methods of expressive communication;encourage clear communication of needs;redirection of intrusive behaviors;redirection of aggressive behaviors;assist patient in developing safety plan;encourage nutrition and hydration;encourage participation / independence with adls;provide emotional support;establish therapeutic relationship;assist with developing & utilizing healthy coping strategies   Social and Therapeutic Interventions (Behavioral Disturbance) encourage socialization with peers;encourage effective boundaries with peers;encourage participation in therapeutic groups and milieu activities   patient is waiting discharge to treatment. Patient needed no re-direction. Patient spent the majority of the evening in the milieu. Full range affect. Baseline memory. No SI/SIB. No psychotic symptoms endorsed.

## 2017-09-25 VITALS
WEIGHT: 153 LBS | RESPIRATION RATE: 17 BRPM | BODY MASS INDEX: 20.75 KG/M2 | TEMPERATURE: 97.5 F | HEART RATE: 90 BPM | DIASTOLIC BLOOD PRESSURE: 72 MMHG | SYSTOLIC BLOOD PRESSURE: 103 MMHG | OXYGEN SATURATION: 99 %

## 2017-09-25 LAB — LITHIUM SERPL-SCNC: 0.6 MMOL/L (ref 0.6–1.2)

## 2017-09-25 PROCEDURE — 80178 ASSAY OF LITHIUM: CPT | Performed by: NURSE PRACTITIONER

## 2017-09-25 PROCEDURE — 25000132 ZZH RX MED GY IP 250 OP 250 PS 637: Performed by: NURSE PRACTITIONER

## 2017-09-25 PROCEDURE — 25000132 ZZH RX MED GY IP 250 OP 250 PS 637: Performed by: PSYCHIATRY & NEUROLOGY

## 2017-09-25 PROCEDURE — 99239 HOSP IP/OBS DSCHRG MGMT >30: CPT | Performed by: NURSE PRACTITIONER

## 2017-09-25 PROCEDURE — 36415 COLL VENOUS BLD VENIPUNCTURE: CPT | Performed by: NURSE PRACTITIONER

## 2017-09-25 RX ORDER — QUETIAPINE FUMARATE 300 MG/1
300 TABLET, FILM COATED ORAL AT BEDTIME
Qty: 30 TABLET | Refills: 2 | Status: SHIPPED | OUTPATIENT
Start: 2017-09-25 | End: 2017-10-23

## 2017-09-25 RX ORDER — QUETIAPINE FUMARATE 50 MG/1
50 TABLET, FILM COATED ORAL EVERY 4 HOURS PRN
Qty: 90 TABLET | Refills: 2 | Status: SHIPPED | OUTPATIENT
Start: 2017-09-25 | End: 2017-10-22

## 2017-09-25 RX ORDER — LITHIUM CARBONATE 300 MG/1
900 TABLET, FILM COATED, EXTENDED RELEASE ORAL AT BEDTIME
COMMUNITY
Start: 2017-09-25 | End: 2018-11-02

## 2017-09-25 RX ORDER — LORAZEPAM 2 MG/1
2 TABLET ORAL 2 TIMES DAILY
COMMUNITY
Start: 2017-09-25 | End: 2017-10-22

## 2017-09-25 RX ADMIN — RISPERIDONE 4 MG: 2 TABLET ORAL at 08:27

## 2017-09-25 RX ADMIN — FLUOXETINE 20 MG: 20 CAPSULE ORAL at 08:27

## 2017-09-25 RX ADMIN — ACYCLOVIR 400 MG: 200 CAPSULE ORAL at 08:27

## 2017-09-25 RX ADMIN — LORAZEPAM 2 MG: 1 TABLET ORAL at 08:27

## 2017-09-25 RX ADMIN — EMTRICITABINE AND TENOFOVIR DISOPROXIL FUMARATE 1 TABLET: 200; 300 TABLET, FILM COATED ORAL at 08:27

## 2017-09-25 ASSESSMENT — ACTIVITIES OF DAILY LIVING (ADL)
GROOMING: INDEPENDENT
ORAL_HYGIENE: INDEPENDENT
DRESS: INDEPENDENT
LAUNDRY: WITH SUPERVISION

## 2017-09-25 NOTE — PROGRESS NOTES
"Reviewed relapse prevention plan with pt    Intake appointment was offered to pt on Friday 9/29/17 with Ness Haskins @ 12:00  [facility]   [address]  [facility]  [phone]    Pt declined appointment.  Suggested to pt that she call [facility] Behavioral Health department in the future should she need assistance locating a therapist. She reports she was aware of this number and had used it before.  She reported that therapy \"made things worse\".  She did agree to keep the appointment scheduled with [facility] NP at [facility]  Pt offers that while she 'struggles with homicidal ideations' she has never acted on them and does not feel at risk to do so.  She reported her technique for managing the thoughts which involved time alone.  Pt feels stable and requests discharge.  She reports that she has a ride home.     Pt is now requesting the appointment, so this CTC called and appt now back on.    "

## 2017-09-25 NOTE — DISCHARGE SUMMARY
"Psychiatric Discharge Summary    Nay Lainez MRN# [id number]   Age: 26 year old YOB: 1990     Date of Admission:  9/21/2017  Date of Discharge:  9/25/2017  Admitting Physician:  Scott Hanna MD  Discharge Physician:  JUAN Hyde CNP (Contact: [phone])         Event Leading to Hospitalization:      From H&P 9/22/2017:    Nay Lainez is a 26-year-old male to female trangender individual admitted to [facility] on 9/21/2017.  She was admitted as a voluntary patient through the ER due to homicidal ideation.  She has chronic HI but for 2 days prior to admission her thoughts were more intense and she was unsure whether she could maintain safety.  She does not have a specific target in mind.  She has passive SI.  She has been having paranoia and auditory and visual hallucinations.  She was using marijuana daily prior to admission.  She was on [facility] in August and says the homicidal ideation and hallucinations worsened about a week after her discharge.  She is unable to identify any stressors.  She has been taking her meds as prescribed.      She has homicidal ideation with a plan to strangle someone.  She has no particular target in mind.  She recently had paranoid thoughts that others would kill her and she hid knives in her apartment.  She has been experiencing auditory hallucinations that are yelling at each other and she cannot decipher the words.  She has visual hallucinations of bugs.  When she shuts her eyes she reports seeing death and visions of herself killing others.   She has been sleeping 8 hours per night, sometimes less.  Mood has been depressed.  She endorses passive suicidal thoughts with no intent or plan.  She endorses lower interest.  Appetite has been good.  Energy has been low.  Motivation is low.  Concentration is poor.  She has feelings of hopelessness, helplessness and worthlessness.  She cries \"all the time.\"  She reports high " anxiety.  She denies irritability.  She denies feeling angry.  She has some racing thoughts.  She denies impulsivity.  She denies panic attacks.  She denies symptoms consistent with PTSD.  She denies current eating disorder symptoms.  She says she eats a lot and has gained 15# this year.  She denies symptoms consistent with OCD.        See full admission note by Dorothea Peter NP 9/22/2017 for details.           Diagnoses:     Schizoaffective disorder, bipolar type.    Cannabis use disorder.  Borderline personality disorder.  Male to female transgender.         Labs:        Ref. Range 9/25/2017 07:20   Lithium Level Latest Ref Range: 0.6 - 1.2 mmol/L 0.6        Ref. Range 9/21/2017 14:30   Amphetamine Qual Urine Latest Ref Range: NEG^Negative  Negative   Cocaine Qual Urine Latest Ref Range: NEG^Negative  Negative   Opiates Qualitative Urine Latest Ref Range: NEG^Negative  Negative   Cannabinoids Qual Urine Latest Ref Range: NEG^Negative  Positive (A)   Barbiturates Qual Urine Latest Ref Range: NEG^Negative  Negative   Benzodiazepine Qual Urine Latest Ref Range: NEG^Negative  Negative   Ethanol Qual Urine Latest Ref Range: NEG^Negative  Negative            Consults:     No consultations were requested during this admission.         Hospital Course:     Nay Lainez was admitted to [facility] with attending Jacques, Scott Hodge MD, under the direct care of Dorothea Peter NP as a voluntary patient. The patient was placed under status 15 (15 minute checks) to ensure patient safety.     [place] Prescription Monitoring was reviewed.  Risperdal 4mg BID was continued.  Ativan 2mg BID was continued.  Klonopin 1mg at HS was continued.  Prozac 20mg daily was continued.  Lithium 600mg at HS was continued (level 0.6).  Seroquel 200mg at HS was added.  Seroquel 25-50mg PO q 4 hours PRN was added.      Nay Lainez attended few groups.  She spent some time in the milieu.  Behavior was calm and cooperative.    She stated her mood  "was \"good.\"  She denied suicidal ideation.  She reported her homicidal ideation was minimal and that she would not act upon it.  Auditory and visual hallucinations were \"almost nonexistent.\"  Sleep was adequate.      Nay Lainez was released to home.  At the time of discharge Nay Lainez was determined to not be a danger to himself or others.          Discharge Medications:      Nay Lainez   Home Medication Instructions ROC:[id number]    Printed on:09/25/17 5935   Medication Information                      ACYCLOVIR PO  Take 400 mg by mouth 2 times daily              clonazePAM (KLONOPIN) 1 MG tablet  Take 1 mg by mouth At Bedtime   #0 dispensed           emtricitabine-tenofovir (TRUVADA) 200-300 MG per tablet  Take 1 tablet by mouth daily              estradiol valerate (DELESTROGEN) 20 MG/ML injection  Inject 2 mg into the muscle every 7 days Takes on Sundays              FLUoxetine (PROZAC) 10 MG capsule  Take 20 mg by mouth daily              fluticasone (FLONASE) 50 MCG/ACT nasal spray  Spray 2 sprays into both nostrils every evening              lithium (ESKALITH/LITHOBID) 300 MG CR tablet  Take 2 tablets (600 mg) by mouth At Bedtime             loratadine (CLARITIN) 10 MG tablet  Take 10 mg by mouth At Bedtime              LORazepam (ATIVAN) 2 MG tablet  Take 1 tablet (2 mg) by mouth 2 times daily  #0 dispensed           QUEtiapine (SEROQUEL) 300 MG tablet  Take 1 tablet (300 mg) by mouth At Bedtime             QUEtiapine (SEROQUEL) 50 MG tablet  Take 1 tablet (50 mg) by mouth every 4 hours as needed (agitation)             RisperiDONE (RISPERDAL PO)  Take 4 mg by mouth 2 times daily                        Psychiatric Examination:     Appearance:  awake, alert, adequate grooming, multiple tattoos  Attitude:  cooperative  Eye Contact:  minimal  Mood:  \"good\"  Affect:  normal range  Speech:  clear, coherent  Psychomotor Behavior:  no evidence of tardive dyskinesia, dystonia, or tics  Thought " Process:  linear and goal oriented  Associations:  no loose associations  Thought Content:  denies SI, homicidal ideation is minimal with no specific target and she states she will not act upon it, auditory hallucinations of screaming are minimal  Insight:  limited  Judgment: fair  Oriented to: date, time, person, and place  Attention Span and Concentration:  fair  Recent and Remote Memory:  fair  Language: Able to name objects, Able to repeat phrases and Able to read and write  Fund of Knowledge: appropriate  Muscle Strength and Tone: normal  Gait and Station: Normal    /72  Pulse 90  Temp 97  F (36.1  C)  Resp 16  Wt 69.4 kg (153 lb)  SpO2 99%  BMI 20.75 kg/m2           Discharge Plan:     Per discharge AVS:      Health Care Follow-up Appointments:     Psych NP -  Leatha Trujillo  Follow Appointment on 10/20/17 Friday @ 9:20am  Intake appointment on Friday 9/29/17 with Ness Haskins @ 12:00 was offered but you preferred to not see a therapist so this was cancelled.  (Please call the number on your insurance card should you want help scheduling an appointment for therapy in the future)  [facility]   [address] [place], [place] [address]  [phone] fax: [phone]     Primary Care Physician: Dr. Ventura Morelos      She was provided with scripts for a 30-day supply of Seroquel.  She has an ample supply of other medications at home.  She was advised to take her medications as prescribed and to abstain from alcohol and illicit substances.        Attestation:  The patient has been seen and evaluated by me,  JUAN Hyde CNP   Discharge time > 30 minutes

## 2017-09-25 NOTE — DISCHARGE INSTRUCTIONS
Behavioral Discharge Planning and Instructions      Summary:  You were admitted on 9/21/2017  due to Depression and homicidal ideations, auditory hallucinations.  You were treated by Dorothea Peter NP and discharged on 9/25/17 from [facility].      Principal Diagnosis:     Schizoaffective disorder, bipolar type.    Cannabis use disorder.  Borderline personality disorder.      Health Care Follow-up Appointments:     Psych NP -  Leatha Trujillo  Follow Appointment on 10/20/17 Friday @ 9:20am  Intake appointment on Friday 9/29/17 with Ness Haskins @ 12:00  [facility]   [address] [place] [address] [address]  [facility]  [phone] fax: [phone]     Primary Care Physician: Dr. Ventura Morelos     Attend all scheduled appointments with your outpatient providers. Call at least 24 hours in advance if you need to reschedule an appointment to ensure continued access to your outpatient providers.   Major Treatments, Procedures and Findings:  You were provided with: a psychiatric assessment, assessed for medical stability, medication evaluation and/or management and milieu management    Symptoms to Report: feeling more aggressive, mood getting worse, thoughts of suicide or thoughts of homicide, substance abuse.    Early warning signs can include: increased depression or anxiety sleep disturbances increased thoughts or behaviors of suicide or self-harm     Safety and Wellness:  Take all medicines as directed.  Make no changes unless your doctor suggests them.      Follow treatment recommendations.  Refrain from alcohol and non-prescribed drugs.  If there is a concern for safety, call 911.    Resources:   Crisis Intervention: 956.647.1741 or 870-829-2265 (TTY: 770.105.9021).  Call anytime for help.  National [place] on Mental Illness (www.[place].monse.org): 133.722.2866 or 007-824-3790.  Alcoholics Anonymous (www.alcoholics-anonymous.org): Check your phone book for your local chapter.  Suicide Awareness Voices of  "Education (SAVE) (www.save.org): 204-647-YHVA (7283)  National Suicide Prevention Line (www.mentalhealthmn.org): 256-511-UTPO (9247)  Mental Health Consumer/Survivor Network of [place] (www.mhcsn.net): 189.687.6316 or 196-223-9279  Mental Health Association of [place] (www.mentalhealth.org): 987.691.7569 or 541-711-5029  Self- Management and Recovery Training., SMART-- Toll free: 942.897.5473  www.[employer]  [facility] Crisis (COPE) Response - Adult 031 808-0078  Text 4 Life: txt \"LIFE\" to 23573 for immediate support and crisis intervention  Crisis text line: Text \"START\" to 396-490. Free, confidential, 24/7.  Crisis Intervention: 664.411.2726 or 630-787-0206. Call anytime for help.     The treatment team has appreciated the opportunity to work with you.     If you have any questions or concerns our unit number is [phone]  You may be receiving a follow-up phone call within the next three days from a representative from behavioral health.    You can be reached at [phone].        "

## 2017-09-25 NOTE — PLAN OF CARE
Problem: Depressive Symptoms  Goal: Depressive Symptoms  Signs and symptoms of listed problems will be absent or manageable.  Pt denies suicidal ideation and sib. Pt states she still has problems with some anxiety and restlessness. Pt has been with other patients more tonight and out in the lounge more. Pt's affect still remains flat. Pt denies that she's hearing voices. Pt pleasant and cooperative with staff.

## 2017-09-28 ENCOUNTER — MEDICAL CORRESPONDENCE (OUTPATIENT)
Dept: HEALTH INFORMATION MANAGEMENT | Facility: CLINIC | Age: 27
End: 2017-09-28

## 2017-10-22 ENCOUNTER — HOSPITAL ENCOUNTER (EMERGENCY)
Facility: CLINIC | Age: 27
Discharge: HOME | End: 2017-10-23
Attending: PSYCHIATRY & NEUROLOGY | Admitting: PSYCHIATRY & NEUROLOGY
Payer: COMMERCIAL

## 2017-10-22 DIAGNOSIS — R45.850 HOMICIDAL IDEATION: ICD-10-CM

## 2017-10-22 DIAGNOSIS — R45.851 SUICIDAL IDEATION: ICD-10-CM

## 2017-10-22 DIAGNOSIS — F25.0 SCHIZOAFFECTIVE DISORDER, BIPOLAR TYPE (H): ICD-10-CM

## 2017-10-22 LAB
AMPHETAMINES UR QL SCN: NEGATIVE
BARBITURATES UR QL: NEGATIVE
BENZODIAZ UR QL: POSITIVE
CANNABINOIDS UR QL SCN: NEGATIVE
COCAINE UR QL: NEGATIVE
ETHANOL UR QL SCN: NEGATIVE
HCG UR QL: NEGATIVE
OPIATES UR QL SCN: NEGATIVE

## 2017-10-22 PROCEDURE — 81025 URINE PREGNANCY TEST: CPT | Performed by: FAMILY MEDICINE

## 2017-10-22 PROCEDURE — 80320 DRUG SCREEN QUANTALCOHOLS: CPT | Performed by: FAMILY MEDICINE

## 2017-10-22 PROCEDURE — 80307 DRUG TEST PRSMV CHEM ANLYZR: CPT | Performed by: FAMILY MEDICINE

## 2017-10-22 PROCEDURE — 99284 EMERGENCY DEPT VISIT MOD MDM: CPT | Mod: Z6 | Performed by: PSYCHIATRY & NEUROLOGY

## 2017-10-22 PROCEDURE — 90791 PSYCH DIAGNOSTIC EVALUATION: CPT

## 2017-10-22 PROCEDURE — 99285 EMERGENCY DEPT VISIT HI MDM: CPT | Mod: 25

## 2017-10-22 ASSESSMENT — ENCOUNTER SYMPTOMS
SLEEP DISTURBANCE: 1
ENDOCRINE NEGATIVE: 1
GASTROINTESTINAL NEGATIVE: 1
EYES NEGATIVE: 1
NERVOUS/ANXIOUS: 1
CONSTITUTIONAL NEGATIVE: 1
RESPIRATORY NEGATIVE: 1
HALLUCINATIONS: 1
DECREASED CONCENTRATION: 1
NEUROLOGICAL NEGATIVE: 1
CARDIOVASCULAR NEGATIVE: 1
MUSCULOSKELETAL NEGATIVE: 1
HEMATOLOGIC/LYMPHATIC NEGATIVE: 1

## 2017-10-22 NOTE — ED AVS SNAPSHOT
[facility], [place], Emergency Department    [address]    [facility] [address]    Phone:  [phone]    Fax:  [phone]                                       Nay Lainez   MRN: [id number]    Department:  [facility], Emergency Department   Date of Visit:  10/22/2017           After Visit Summary Signature Page     I have received my discharge instructions, and my questions have been answered. I have discussed any challenges I see with this plan with the nurse or doctor.    ..........................................................................................................................................  Patient/Patient Representative Signature      ..........................................................................................................................................  Patient Representative Print Name and Relationship to Patient    ..................................................               ................................................  Date                                            Time    ..........................................................................................................................................  Reviewed by Signature/Title    ...................................................              ..............................................  Date                                                            Time

## 2017-10-22 NOTE — ED AVS SNAPSHOT
[facility], Emergency Department    [address]    [place] [address]    Phone:  [phone]    Fax:  [phone]                                       Nay Lainez   MRN: [id number]    Department:  [facility], Emergency Department   Date of Visit:  10/22/2017           Patient Information     Date Of Birth          1990        Your diagnoses for this visit were:     Schizoaffective disorder, bipolar type (H)        You were seen by Petr Joy MD and Rebeca Garcia MD.        Discharge Instructions       Continue to work with your current outpatient providers and take meds as previously prescribed.     Seek medical attention if safety concerns.     Future Appointments        Provider Department Dept Phone Center    10/27/2017 1:00 PM João Díaz MD [facility] Plastic and Reconstructive Surgery [phone] [facility]      24 Hour Appointment Hotline       To make an appointment at any [facility], call [phone]). If you don't have a family doctor or clinic, we will help you find one. [facility] clinics are conveniently located to serve the needs of you and your family.             Review of your medicines      Our records show that you are taking the medicines listed below. If these are incorrect, please call your family doctor or clinic.        Dose / Directions Last dose taken    acyclovir 400 MG tablet   Commonly known as:  ZOVIRAX   Dose:  400 mg        Take 400 mg by mouth 2 times daily   Refills:  0        chlorproMAZINE 25 MG tablet   Commonly known as:  THORAZINE   Dose:  25 mg        Take 25 mg by mouth 4 times daily   Refills:  0        diazepam 10 MG tablet   Commonly known as:  VALIUM   Dose:  10 mg        Take 10 mg by mouth 2 times daily   Refills:  0        estradiol valerate 20 MG/ML injection   Commonly known as:  DELESTROGEN   Dose:  2 mg        Inject 2 mg into the muscle every 7 days Takes on Sundays   Refills:  0        fluticasone 50 MCG/ACT  spray   Commonly known as:  FLONASE   Dose:  2 spray        Spray 2 sprays into both nostrils every evening   Refills:  0        lithium 300 MG CR tablet   Commonly known as:  ESKALITH/LITHOBID   Dose:  900 mg        Take 900 mg by mouth At Bedtime   Refills:  0        loratadine 10 MG tablet   Commonly known as:  CLARITIN   Dose:  10 mg        Take 10 mg by mouth At Bedtime   Refills:  0        PROZAC 20 MG capsule   Dose:  20 mg   Generic drug:  FLUoxetine        Take 20 mg by mouth daily   Refills:  0        QUEtiapine 150 mg half-tab   Commonly known as:  SEROquel   Dose:  150 mg        Take 150 mg by mouth At Bedtime   Refills:  0        RISPERDAL PO   Dose:  4 mg        Take 4 mg by mouth At Bedtime   Refills:  0        TRUVADA 200-300 MG per tablet   Dose:  1 tablet   Indication:  PrEP   Generic drug:  emtricitabine-tenofovir        Take 1 tablet by mouth daily   Refills:  0                Procedures and tests performed during your visit     Drug abuse screen 6 urine (chem dep)    HCG qualitative urine    Lithium level    Medication History IP Pharmacy Consult    Psychiatry IP Consult: increased paranoia with si/hi; Consultant may enter orders: Yes; Patient to be seen: STAT; Call back #: [phone]      Orders Needing Specimen Collection     None      Pending Results     No orders found for last 3 day(s).            Pending Culture Results     No orders found for last 3 day(s).            Pending Results Instructions     If you had any lab results that were not finalized at the time of your Discharge, you can call the ED Lab Result RN at [phone]. You will be contacted by this team for any positive Lab results or changes in treatment. The nurses are available 7 days a week from 10A to 6:30P.  You can leave a message 24 hours per day and they will return your call.        Thank you for choosing [facility]       Thank you for choosing [facility] for your care. Our goal is always to provide you with excellent  "care. Hearing back from our patients is one way we can continue to improve our services. Please take a few minutes to complete the written survey that you may receive in the mail after you visit with us. Thank you!        [employer] Information     [employer] lets you send messages to your doctor, view your test results, renew your prescriptions, schedule appointments and more. To sign up, go to [email or web address] . Click on \"Log in\" on the left side of the screen, which will take you to the Welcome page. Then click on \"Sign up Now\" on the right side of the page.     You will be asked to enter the access code listed below, as well as some personal information. Please follow the directions to create your username and password.     Your access code is: [email or web address]  Expires: 2017 10:37 AM     Your access code will  in 90 days. If you need help or a new code, please call your [facility] clinic or [phone].        Care EveryWhere ID     This is your Care EveryWhere ID. This could be used by other organizations to access your [facility] medical records  [id number]        Equal Access to Services     ELIESER CASILLAS : Hadii kimberly Stewart, doroteo mccabe, shahzad beck, denver mcdonald . So [facility] [phone].    ATENCIÓN: Si habla español, tiene a knight disposición servicios gratuitos de asistencia lingüística. Llame al [phone].    We comply with applicable federal civil rights laws and [place] laws. We do not discriminate on the basis of race, color, national origin, age, disability, sex, sexual orientation, or gender identity.            After Visit Summary       This is your record. Keep this with you and show to your community pharmacist(s) and doctor(s) at your next visit.                  "

## 2017-10-23 VITALS
OXYGEN SATURATION: 91 % | SYSTOLIC BLOOD PRESSURE: 108 MMHG | TEMPERATURE: 96.9 F | RESPIRATION RATE: 16 BRPM | DIASTOLIC BLOOD PRESSURE: 62 MMHG | BODY MASS INDEX: 21.92 KG/M2 | HEART RATE: 77 BPM | WEIGHT: 161.6 LBS

## 2017-10-23 LAB — LITHIUM SERPL-SCNC: <0.2 MMOL/L (ref 0.6–1.2)

## 2017-10-23 PROCEDURE — 25000132 ZZH RX MED GY IP 250 OP 250 PS 637: Performed by: PSYCHIATRY & NEUROLOGY

## 2017-10-23 PROCEDURE — 25000132 ZZH RX MED GY IP 250 OP 250 PS 637: Performed by: EMERGENCY MEDICINE

## 2017-10-23 PROCEDURE — 80178 ASSAY OF LITHIUM: CPT | Performed by: PSYCHIATRY & NEUROLOGY

## 2017-10-23 PROCEDURE — 99215 OFFICE O/P EST HI 40 MIN: CPT | Performed by: PSYCHIATRY & NEUROLOGY

## 2017-10-23 RX ORDER — ACYCLOVIR 200 MG/1
400 CAPSULE ORAL ONCE
Status: COMPLETED | OUTPATIENT
Start: 2017-10-23 | End: 2017-10-23

## 2017-10-23 RX ORDER — DIAZEPAM 10 MG
10 TABLET ORAL 2 TIMES DAILY
COMMUNITY
End: 2018-11-07

## 2017-10-23 RX ORDER — FLUTICASONE PROPIONATE 50 MCG
2 SPRAY, SUSPENSION (ML) NASAL EVERY EVENING
Status: DISCONTINUED | OUTPATIENT
Start: 2017-10-23 | End: 2017-10-23 | Stop reason: HOSPADM

## 2017-10-23 RX ORDER — CHLORPROMAZINE HYDROCHLORIDE 25 MG/1
25 TABLET, FILM COATED ORAL 4 TIMES DAILY
COMMUNITY
End: 2018-11-07

## 2017-10-23 RX ORDER — CHLORPROMAZINE HYDROCHLORIDE 25 MG/1
25 TABLET, FILM COATED ORAL 4 TIMES DAILY
Status: DISCONTINUED | OUTPATIENT
Start: 2017-10-23 | End: 2017-10-23 | Stop reason: HOSPADM

## 2017-10-23 RX ORDER — RISPERIDONE 4 MG/1
4 TABLET ORAL DAILY
Status: DISCONTINUED | OUTPATIENT
Start: 2017-10-23 | End: 2017-10-23

## 2017-10-23 RX ORDER — LITHIUM CARBONATE 450 MG
900 TABLET, EXTENDED RELEASE ORAL AT BEDTIME
Status: DISCONTINUED | OUTPATIENT
Start: 2017-10-23 | End: 2017-10-23 | Stop reason: HOSPADM

## 2017-10-23 RX ORDER — EMTRICITABINE AND TENOFOVIR DISOPROXIL FUMARATE 200; 300 MG/1; MG/1
1 TABLET, FILM COATED ORAL DAILY
Status: DISCONTINUED | OUTPATIENT
Start: 2017-10-24 | End: 2017-10-23 | Stop reason: HOSPADM

## 2017-10-23 RX ORDER — ACYCLOVIR 400 MG/1
400 TABLET ORAL 2 TIMES DAILY
COMMUNITY
End: 2018-11-07

## 2017-10-23 RX ORDER — ACYCLOVIR 200 MG/1
400 CAPSULE ORAL 2 TIMES DAILY
Status: DISCONTINUED | OUTPATIENT
Start: 2017-10-23 | End: 2017-10-23

## 2017-10-23 RX ORDER — RISPERIDONE 4 MG/1
4 TABLET ORAL AT BEDTIME
Status: DISCONTINUED | OUTPATIENT
Start: 2017-10-23 | End: 2017-10-23 | Stop reason: HOSPADM

## 2017-10-23 RX ORDER — RISPERIDONE 4 MG/1
4 TABLET ORAL ONCE
Status: COMPLETED | OUTPATIENT
Start: 2017-10-23 | End: 2017-10-23

## 2017-10-23 RX ORDER — ESTRADIOL VALERATE 20 MG/ML
2 INJECTION INTRAMUSCULAR
Status: DISCONTINUED | OUTPATIENT
Start: 2017-10-29 | End: 2017-10-23 | Stop reason: HOSPADM

## 2017-10-23 RX ORDER — ACETAMINOPHEN 325 MG/1
650 TABLET ORAL ONCE
Status: COMPLETED | OUTPATIENT
Start: 2017-10-23 | End: 2017-10-23

## 2017-10-23 RX ORDER — EMTRICITABINE AND TENOFOVIR DISOPROXIL FUMARATE 200; 300 MG/1; MG/1
1 TABLET, FILM COATED ORAL DAILY
Status: DISCONTINUED | OUTPATIENT
Start: 2017-10-23 | End: 2017-10-23

## 2017-10-23 RX ORDER — LORATADINE 10 MG/1
10 TABLET ORAL AT BEDTIME
Status: DISCONTINUED | OUTPATIENT
Start: 2017-10-23 | End: 2017-10-23 | Stop reason: HOSPADM

## 2017-10-23 RX ORDER — DIAZEPAM 10 MG
10 TABLET ORAL 2 TIMES DAILY
Status: DISCONTINUED | OUTPATIENT
Start: 2017-10-23 | End: 2017-10-23 | Stop reason: HOSPADM

## 2017-10-23 RX ORDER — ACYCLOVIR 200 MG/1
400 CAPSULE ORAL 2 TIMES DAILY
Status: DISCONTINUED | OUTPATIENT
Start: 2017-10-23 | End: 2017-10-23 | Stop reason: HOSPADM

## 2017-10-23 RX ORDER — EMTRICITABINE AND TENOFOVIR DISOPROXIL FUMARATE 200; 300 MG/1; MG/1
1 TABLET, FILM COATED ORAL ONCE
Status: COMPLETED | OUTPATIENT
Start: 2017-10-23 | End: 2017-10-23

## 2017-10-23 RX ORDER — QUETIAPINE FUMARATE 100 MG/1
300 TABLET, FILM COATED ORAL AT BEDTIME
Status: DISCONTINUED | OUTPATIENT
Start: 2017-10-23 | End: 2017-10-23

## 2017-10-23 RX ORDER — CHLORPROMAZINE HYDROCHLORIDE 25 MG/1
25 TABLET, FILM COATED ORAL ONCE
Status: COMPLETED | OUTPATIENT
Start: 2017-10-23 | End: 2017-10-23

## 2017-10-23 RX ADMIN — FLUOXETINE 20 MG: 20 CAPSULE ORAL at 08:56

## 2017-10-23 RX ADMIN — EMTRICITABINE AND TENOFOVIR DISOPROXIL FUMARATE 1 TABLET: 200; 300 TABLET, FILM COATED ORAL at 08:46

## 2017-10-23 RX ADMIN — ACETAMINOPHEN 650 MG: 325 TABLET, FILM COATED ORAL at 14:08

## 2017-10-23 RX ADMIN — CHLORPROMAZINE HYDROCHLORIDE 25 MG: 25 TABLET, SUGAR COATED ORAL at 12:35

## 2017-10-23 RX ADMIN — RISPERIDONE 4 MG: 4 TABLET ORAL at 08:46

## 2017-10-23 RX ADMIN — CHLORPROMAZINE HYDROCHLORIDE 25 MG: 25 TABLET, SUGAR COATED ORAL at 08:45

## 2017-10-23 RX ADMIN — DIAZEPAM 10 MG: 10 TABLET ORAL at 11:34

## 2017-10-23 RX ADMIN — ACYCLOVIR 400 MG: 200 CAPSULE ORAL at 08:47

## 2017-10-23 NOTE — PLAN OF CARE
Pt ate a good lunch Medicated with Tylenol for h/a. Pt states shes feeling better and would like to be reassessed and go home. Glennr notified

## 2017-10-23 NOTE — ED NOTES
Pt arrives to [facility] ([place] Psych Associate explains process to pt; they will meet with a doctor and a mental health accessor and a plan will be determined from there. Pt offered nutrition, fluids and comfort measures.

## 2017-10-23 NOTE — ED PROVIDER NOTES
Pt received in sign-out from attending on prior shift     Briefly, 26-year-old male to female transgender patient, schizoaffective disorder with increasing hallucinations, paranoia admitted for psychotic symptoms as well as suicidality and homicidality.  Patient is voluntary. If the patient decides she feels well and wishes to leave, should be reevaluated by back .  Calm and cooperative at time of sign out        /63  Pulse 92  Temp 96.9  F (36.1  C)  Resp 16  Wt 73.3 kg (161 lb 9.6 oz)  SpO2 94%  BMI 21.92 kg/m2  Gen: no acute distress, cooperative  Unlabored breathing  RRR    Will continue to monitor, awaiting bed           Javid Bishop MD  10/23/17 8993

## 2017-10-23 NOTE — ED NOTES
"Patient says she is feeling better and would like to go home.  Initially she was having \"one of her psychotic episodes that cause her to cry and be delusion.  At that time she was having thoughts to strangle others.\"  She says she never has actually acted on it.  Usually her episodes go away after 24-48 hours.  Do to being in the ER so long, she is feeling better and ready to go home.  She received meds while in the ED.  She can contract for safety and has outpatient providers that she can continue to work with.  She says she may have an episode like this once in a month.  She denies si/hi.  No delusions at this time.  She was reassessed by DEC  who feels that she is safe and improved and is able to be d/c home per patient's request.     Rebeca Garcia MD  10/23/17 1513    "

## 2017-10-23 NOTE — PHARMACY-ADMISSION MEDICATION HISTORY
Admission medication history interview status for the 10/22/2017 admission is complete. See Epic admission navigator for allergy information, pharmacy, prior to admission medications and immunization status.     Medication history interview sources:  Patient, Epic electronic medical record (discharged from [facility] on 09/25/17)    Changes made to PTA medication list (reason)  Added: diazepam  Deleted: quetiapine 50 mg po Q4hprn, lorazepam, clonazepam  Changed: lithium (dose change), risperidone (dose change), quetiapine (dose change)    Additional medication history information (including reliability of information, actions taken by pharmacist): Patient was a reliable historian and able to confirm the medications from the discharge on 09/25/17). The patient reported being tapered off quetiapine to 150 mg then the plan is to taper off. The patient also reported the dose of lithium was increased from 600 mg to 900 mg. The patient reported starting Valium which replaced lorazepam and clonazepam.    Diazepam 10 mg last filled on 10/13/17 for quantity 60 for 30 day supply      Prior to Admission medications    Medication Sig Last Dose Taking? Auth Provider   acyclovir (ZOVIRAX) 400 MG tablet Take 400 mg by mouth 2 times daily 10/23/2017 at AM Yes Unknown, Entered By History   chlorproMAZINE (THORAZINE) 25 MG tablet Take 25 mg by mouth 4 times daily 10/23/2017 at 1235 Yes Unknown, Entered By History   diazepam (VALIUM) 10 MG tablet Take 10 mg by mouth 2 times daily 10/23/2017 at AM Yes Unknown, Entered By History   FLUoxetine (PROZAC) 20 MG capsule Take 20 mg by mouth daily  Yes Unknown, Entered By History   QUEtiapine (SEROQUEL) 150 mg half-tab Take 150 mg by mouth At Bedtime  Yes Unknown, Entered By History   lithium (ESKALITH/LITHOBID) 300 MG CR tablet Take 900 mg by mouth At Bedtime  10/21/2017 at Unknown time Yes Tyra Peter APRN CNP   RisperiDONE (RISPERDAL PO) Take 4 mg by mouth At Bedtime  10/22/2017 at  Unknown time Yes Reported, Patient   estradiol valerate (DELESTROGEN) 20 MG/ML injection Inject 2 mg into the muscle every 7 days Takes on Sundays  10/14/2017 at Unknown time Yes Unknown, Entered By History   emtricitabine-tenofovir (TRUVADA) 200-300 MG per tablet Take 1 tablet by mouth daily  10/23/2017 at AM Yes Reported, Patient   fluticasone (FLONASE) 50 MCG/ACT nasal spray Spray 2 sprays into both nostrils every evening   Yes Reported, Patient   loratadine (CLARITIN) 10 MG tablet Take 10 mg by mouth At Bedtime   Yes Reported, Patient       Medication history completed by: Ophelia Baum, PharmD, BCPP

## 2017-10-23 NOTE — ED NOTES
Pt has been up to bathroom and ate breakfast otherwise resting. She states she is still feeling homicidal.

## 2017-10-23 NOTE — ED PROVIDER NOTES
"  History     Chief Complaint   Patient presents with     Paranoid     \"I feel like everyone is trying to kill me and I want to kill everyone\"     The history is provided by the patient and medical records.     Nay Lainez is a 26 year old male to female transgendered individual who is here accompanied by her . She has history of schizoaffective disorder. She has history of being hospitalized here a number of times. She sees a psychiatric provider who has been adjusting her meds to target her symptom concern of hallucinations, paranoia and thoughts of harm to self and others. Her quetiapine and risperidone are being titrated off and thorazine was added and is titrated upward. Her Ativan and Klonopin are switched over to Valium. Patient denies using drugs. She is taking her meds as prescribed. She takes Truvada for HIV prophylaxis. Patient has no acute medical concerns. She has had sleep and appetite changes.    Please see [facility] Crisis Assessment on 10/22/17 in [facility] for further details.    PERSONAL MEDICAL HISTORY  Past Medical History:   Diagnosis Date     Anxiety      Depressive disorder      Male-to-female transgender person      PAST SURGICAL HISTORY  Past Surgical History:   Procedure Laterality Date     BREAST SURGERY      augmentation     COSMETIC SURGERY      lip and nose nurgeries     FAMILY HISTORY  No family history on file.  SOCIAL HISTORY  Social History   Substance Use Topics     Smoking status: Current Every Day Smoker     Packs/day: 1.00     Smokeless tobacco: Never Used     Alcohol use No     MEDICATIONS  No current facility-administered medications for this encounter.      Current Outpatient Prescriptions   Medication     DiazePAM (VALIUM PO)     ChlorproMAZINE HCl (THORAZINE PO)     lithium (ESKALITH/LITHOBID) 300 MG CR tablet     QUEtiapine (SEROQUEL) 300 MG tablet     RisperiDONE (RISPERDAL PO)     estradiol valerate (DELESTROGEN) 20 MG/ML injection     FLUoxetine (PROZAC) 10 MG capsule "     emtricitabine-tenofovir (TRUVADA) 200-300 MG per tablet     ACYCLOVIR PO     [DISCONTINUED] QUEtiapine (SEROQUEL) 50 MG tablet     fluticasone (FLONASE) 50 MCG/ACT nasal spray     loratadine (CLARITIN) 10 MG tablet     ALLERGIES  No Known Allergies      I have reviewed the Medications, Allergies, Past Medical and Surgical History, and Social History in the Epic system.    Review of Systems   Constitutional: Negative.    HENT: Negative.    Eyes: Negative.    Respiratory: Negative.    Cardiovascular: Negative.    Gastrointestinal: Negative.    Endocrine: Negative.    Genitourinary: Negative.    Musculoskeletal: Negative.    Skin: Negative.    Neurological: Negative.    Hematological: Negative.    Psychiatric/Behavioral: Positive for decreased concentration, hallucinations, sleep disturbance and suicidal ideas. The patient is nervous/anxious.    All other systems reviewed and are negative.      Physical Exam   BP: 120/63  Pulse: 92  Temp: 96.9  F (36.1  C)  Resp: 16  Weight: 73.3 kg (161 lb 9.6 oz)  SpO2: 94 %      Physical Exam   Constitutional: He appears well-developed and well-nourished.   HENT:   Head: Normocephalic.   Eyes: Pupils are equal, round, and reactive to light.   Neck: Normal range of motion.   Cardiovascular: Normal rate.    Pulmonary/Chest: Effort normal.   Abdominal: Soft.   Musculoskeletal: Normal range of motion.   Neurological: He is alert.   Skin: Skin is warm.   Psychiatric: His speech is normal. Judgment normal. He is withdrawn. He is not agitated, not aggressive, not hyperactive and not combative. Thought content is paranoid. Cognition and memory are normal. He exhibits a depressed mood. He expresses suicidal ideation.   Nursing note and vitals reviewed.      ED Course     ED Course     Procedures    Labs Ordered and Resulted from Time of ED Arrival Up to the Time of Departure from the ED - No data to display         Assessments & Plan (with Medical Decision Making)   Patient with  schizoaffective disorder who feels unstable and is having thoughts of harm to self and others. She is able to maintain behavioral control. She feels unsafe. There was discussion of trying ECT as meds are not helping. Patient would like admission. She is referred for admission.    I have reviewed the nursing notes.    I have reviewed the findings, diagnosis, plan and need for follow up with the patient.    New Prescriptions    No medications on file       Final diagnoses:   Schizoaffective disorder, bipolar type (H)   Suicidal ideation   Homicidal ideation       10/22/2017   [facility], [place], EMERGENCY DEPARTMENT     Petr Joy MD  10/22/17 4167

## 2017-10-23 NOTE — H&P
"[facility], [place]   Psychiatric History & Physical  Admission date: 10/22/2017  Nay Lainez  [id number]  10/23/17    Time: 73 minutes on encounter, >50% of which was spent in counseling and/or coordination of care consisting of: communication and education with the patient, communication with family and or friends, lab/image/study evaluation, support staff communication, and other sources pertinent to excellent patient care.          Chief Complaint:   \"I'm worried people are trying to kill me\"        HPI:   Nay Lainez with a past medical history of depression, anxiety, schizoaffective disorder, cannabis use, borderline personality disorder, eating disorder, ADHD was admitted 10/22/2017 for thoughts of harming others, paranoia, and thoughts of hurting self.    Nay according to records has been having auditory hallucinations, paranoia, thoughts of harming other people by strangling, and thoughts of ending her own life. Recent medication changes include decreases and quetiapine and risperidone to start Thorazine and titrate this medication up. Other medication changes include lorazepam and clonazepam going to diazepam.    Nay reports to me that she believes people are trying to kill her for the past 3 days and labs her current medications have never helped with this and neither have previous medications. She has previously tried Thorazine but not at 100 mg and it does make her tired throughout the day but this resolved when she previously took it for about 1-2 weeks. He diazepam that she is now taking does help her anxiety and she has never been on more attending 30 mg of fluoxetine to help with depressive symptoms or anxiety symptoms. She also takes 900 mg of lithium at night. She is currently taking quetiapine 150 mg and risperidone 4 mg. When describing homicidal thoughts this has been going on for about 2 years with the wish to strangle someone to watch them die. She " has never been violent and has never attempted this act. There is no specific person that she is targeting. She currently denies thoughts of harming herself but previously had thoughts of ending her life. She does feel hopeless and helpless and has attention concentration difficulties with low energy. She does not have anhedonia or guilty feeling has not had any recent sleep difficulties. When describing auditory hallucinations she hears people screaming this is been going on for years about twice per day sometimes worse and sometimes better. She denies any post stress disorder symptoms, any obsessive-compulsive disorder symptoms, any special messages from the television and radio. Panic disorder symptoms and no gambling pornography or sexual addiction or shopping problems. She does have social anxiety and previous manic episodes. She also previously had eating disorder symptoms but that is currently improved.    Physically had weight gain of about 30 pounds on current medications and has improvement in her appetite based on the medications.        Past Psychiatric History:     Describes depression symptoms starting as a teenager and the auditory hallucinations starting at the age of 19. She is been hospitalized at least 12 times and has never attempted suicide and never been violent currently have any legal charges relation or parole. No previous treatment agreement injury seizures or electroconvulsive therapy treatment. She did previously do dialectical behavioral therapy and has a psychiatrist through [facility]. [facility] she has a therapist by the name of Chelle Ramírez. Previous medication trials include Ritalin, Vyvanse, Strattera, Intuniv, Adderall, fluoxetine, citalopram, bupropion, haloperidol, venlafaxine, olanzapine, risperidone, quetiapine, Navane.          Substance Use and History:     Started smoking cigarettes at age 18 currently smokes 1 pack per day, cannabis use  starting at age 24 sometimes. No DUIs or legal troubles related to substance.          Past Medical History:   PAST MEDICAL HISTORY:   Past Medical History:   Diagnosis Date     Anxiety      Depressive disorder      Male-to-female transgender person        PAST SURGICAL HISTORY:   Past Surgical History:   Procedure Laterality Date     BREAST SURGERY      augmentation     COSMETIC SURGERY      lip and nose nurgeries             Family History:   FAMILY HISTORY: No family history on file.        Social History:   SOCIAL HISTORY:   Social History     Social History     Marital status:      Spouse name: N/A     Number of children: N/A     Years of education: N/A     Social History Main Topics     Smoking status: Current Every Day Smoker     Packs/day: 1.00     Smokeless tobacco: Never Used     Alcohol use No     Drug use: Yes     Special: Marijuana      Comment: every few days     Sexual activity: Not Asked     Other Topics Concern     None     Social History Narrative    Born in [place] and primarily raised by mother as father was traveling for business. No abuse or growing up completed school on time and went to some college. 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  about 3 years ago and lives with  and apartment. Never in the  no children and no access to guns. Erns income by prostitution.            Physical ROS:   The patient endorsed the above issues. The remainder of 10-point review of systems was negative except as noted in HPI.         PTA Medications:     (Not in a hospital admission)       Allergies:   No Known Allergies       Labs:     Recent Results (from the past 48 hour(s))   HCG qualitative urine    Collection Time: 10/22/17  8:52 PM   Result Value Ref Range    HCG Qual Urine Negative NEG^Negative   Drug abuse screen 6 urine (chem dep)    Collection Time: 10/22/17  8:52 PM   Result Value Ref Range    Amphetamine Qual Urine  Negative NEG^Negative    Barbiturates Qual Urine Negative NEG^Negative    Benzodiazepine Qual Urine Positive (A) NEG^Negative    Cannabinoids Qual Urine Negative NEG^Negative    Cocaine Qual Urine Negative NEG^Negative    Ethanol Qual Urine Negative NEG^Negative    Opiates Qualitative Urine Negative NEG^Negative   Lithium level    Collection Time: 10/23/17  9:04 AM   Result Value Ref Range    Lithium Level <0.20 (L) 0.60 - 1.20 mmol/L          Physical and Psychiatric Examination:     /62  Pulse 77  Temp 96.9  F (36.1  C)  Resp 16  Wt 73.3 kg (161 lb 9.6 oz)  SpO2 91%  BMI 21.92 kg/m2  Weight is 161 lbs 9.6 oz  Body mass index is 21.92 kg/(m^2).                                           Last 4 weights:  Wt Readings from Last 4 Encounters:   10/22/17 73.3 kg (161 lb 9.6 oz)   09/24/17 69.4 kg (153 lb)   08/20/17 66.7 kg (147 lb)   05/25/17 58.5 kg (129 lb)       Physical Exam:  I have reviewed the physical exam as documented by Rufino on 10/27 and agree with findings and assessment and have no additional findings to add at this time.    Mental Status Exam:  Nay is a 26-year-old male transitioning to female transgender tattoos and piercings and a shaved head. Speech is of an appropriate rate and tone. Her behavior is lying in bed without abnormal movements. Affect is tired appearing. Mood describes as not good. Thought content consists of the above thoughts of harming self at times but not currently and thoughts of harming other people possible delusional content. Thought process is ruminative without looseness of association. Describes abnormal perceptions however these are not very compelling. Attention and concentration is limited. Cognition and fund of knowledge appears normal. Long-term/short-term/remote memory appears limited. Insight and judgment are both limited.         Admission Diagnoses:   Borderline personality disorder  Rule out schizoaffective disorder  Cannabis use disorder  History of  possible eating disorder  History of possible ADHD  History of major depressive disorder  Rule out malingering         Assessment & Plan:     Assessment:  Nay describes thoughts of homicide and also screaming auditory hallucinations though does not appear distracted throughout conversation. She has never had any violence that I am able to find or any other severe symptoms. It seems she routinely presents voluntarily to the inpatient psychiatry unit. She has had these vague auditory hallucinations and command hallucinations. Would have staff members monitor her during hospitalization and see if the auditory hallucinations do appear as if she is interacting with something else in the room and not only just reporting auditory hallucinations. I'm somewhat skeptical of the diagnosis of schizoaffective or schizophrenia and this could be more indicative of her borderline personality disorder and attention seeking. Found not to have severe mood disorder type symptoms would not give her electroconvulsive therapy as she would be treating probable personality disorder. Further evidence of this is when describing thoughts of other people trying to kill her she does not appear anxious and is resting comfortably in bed. Possibly benefit from outpatient DBT or partial hospitalization. She is unlikely to attempt suicide based on protective factors never attempting suicide previously she is additionally unlikely to attempt harming other people that she could be placed in dangerous situations if she is involved in prostitution.    Plan:  Voluntary admission to inpatient psychiatry, could hold  Monitor and discuss current medication regimen  Possibly interested in electroconvulsive therapy             Javid Joseph  [facility]      The following document has been created with voice recognition software and may contain unintentional word substitutions.

## 2017-10-23 NOTE — DISCHARGE INSTRUCTIONS
Continue to work with your current outpatient providers and take meds as previously prescribed.     Seek medical attention if safety concerns.

## 2017-10-27 ENCOUNTER — OFFICE VISIT (OUTPATIENT)
Dept: PLASTIC SURGERY | Facility: CLINIC | Age: 27
End: 2017-10-27

## 2017-10-27 VITALS
SYSTOLIC BLOOD PRESSURE: 108 MMHG | OXYGEN SATURATION: 99 % | BODY MASS INDEX: 21.97 KG/M2 | DIASTOLIC BLOOD PRESSURE: 77 MMHG | WEIGHT: 162 LBS | HEART RATE: 80 BPM

## 2017-10-27 DIAGNOSIS — F64.0 GENDER DYSPHORIA IN ADULT: Primary | ICD-10-CM

## 2017-10-27 ASSESSMENT — PAIN SCALES - GENERAL: PAINLEVEL: NO PAIN (0)

## 2017-10-27 NOTE — NURSING NOTE
Chief Complaint   Patient presents with     Consult     vaginoplasty        Vitals:    10/27/17 1302   BP: 108/77   Pulse: 80   SpO2: 99%   Weight: 162 lb       Body mass index is 21.97 kg/(m^2).    Terrance MONTANO

## 2017-10-27 NOTE — LETTER
"10/27/2017       RE: Nay Lainez  [address]  [facility] [id number]     Dear Colleague,    Thank you for referring your patient, Nay Lainez, to the [facility] PLASTIC AND RECONSTRUCTIVE SURGERY at [facility]. Please see a copy of my visit note below.    REFERRING PROVIDER: Tamy    REASON FOR CONSULTATION: Gender dysphoria, requesting \"sex reassignment surgery\"    HPI: 26 year old male to female individual who is referred to us for \"sex reassignment surgery.\" Nay is specifically interested in vaginoplasty. She has already undergone three separate rhinoplasties with the last one in Sep 2016, and breast augmentation in Oct 2016, all with a local plastic surgeon. She is very satisfied with her results and wishes to continue with genital surgery to complete her transition. Reports she \"hates\" her penis and that it disgusts her when she is touched there, making it difficult to have sex. For many years she has been hiding her penis by tucking and wearing compression underwear.    Nay explained to us that she had always felt different as a child. She secretly wore Mom's clothes when she was not around. But it wasn't until she saw a drag performer on TV in 2012 that she realized gender transition could be possible. Although she came out as being huggins at the age of 12, it just never felt right until she admitted she is transgender. She transitioned to the female gender and began hormonal therapy under the guidance of Dr. Almaraz five years ago. She has a supportive transgender . She is here with us with a supportive friend. She has history of working as a .    MEDS:   Prior to Admission medications    Medication Sig Start Date End Date Taking? Authorizing Provider   acyclovir (ZOVIRAX) 400 MG tablet Take 400 mg by mouth 2 times daily   Yes Unknown, Entered By History   chlorproMAZINE (THORAZINE) 25 MG tablet Take 25 mg by mouth 4 times " daily   Yes Unknown, Entered By History   diazepam (VALIUM) 10 MG tablet Take 10 mg by mouth 2 times daily   Yes Unknown, Entered By History   FLUoxetine (PROZAC) 20 MG capsule Take 20 mg by mouth daily   Yes Unknown, Entered By History   lithium (ESKALITH/LITHOBID) 300 MG CR tablet Take 900 mg by mouth At Bedtime  9/25/17  Yes Tyra Peter APRN CNP   RisperiDONE (RISPERDAL PO) Take 4 mg by mouth At Bedtime    Yes Reported, Patient   estradiol valerate (DELESTROGEN) 20 MG/ML injection Inject 2 mg into the muscle every 7 days Takes on Sundays    Yes Unknown, Entered By History   emtricitabine-tenofovir (TRUVADA) 200-300 MG per tablet Take 1 tablet by mouth daily    Yes Reported, Patient   fluticasone (FLONASE) 50 MCG/ACT nasal spray Spray 2 sprays into both nostrils every evening    Yes Reported, Patient   loratadine (CLARITIN) 10 MG tablet Take 10 mg by mouth At Bedtime    Yes Reported, Patient       ALLERGIES: No Known Allergies    PMH:   Past Medical History:   Diagnosis Date     Anxiety      Depressive disorder      Male-to-female transgender person    Has history of cirrhosis at age 19 secondary to alcohol abuse, but reports her liver has full recovered since.    PSH:   Past Surgical History:   Procedure Laterality Date     BREAST SURGERY      augmentation     COSMETIC SURGERY      lip and nose nurgeries   Reports history of good response to propofol during surgery.    SH:   Social History   Substance Use Topics     Smoking status: Current Every Day Smoker     Packs/day: 1.00     Smokeless tobacco: Never Used     Alcohol use No   Works as a .    FH: None    ROS: Denies chest pain, shortness of breath, MI, CVA, urinary issues, DVT, PE, and bleeding disorders.    PHYSICAL EXAMINATION: /77  Pulse 80  Wt 162 lb  SpO2 99%  BMI 21.97 kg/m2  Gen: In no acute distress. Multiple piercings and tattoos scattered across her body.  On exam of her penis, shaft skin length is 6.5 cm. It is  circumcised. There is a piercing at the frenulum and also in the perineal region. There is intact sensation in the glans. Testes are present but without palpable irregularities. Scrotum without irregularities or lesions.    ASSESSMENT: Gender dysphoria, requesting vaginoplasty.    PLAN: We discussed the vaginoplasty procedure, the required 1 - 2 weeks hospital inpatient stay, and the need for lifelong dilation or frequent intercourse. Patient will require scrotal skin grafting to augment the neovagina as penile shaft skin is less than 10 cm. She is specifically requesting a large neoclitoris and wishes to bring in reference photos at her next follow up visit. In the meantime, I have asked the patient to prepare two letters of support from her therapists and to also quit smoking. She understands the elective nature of this procedure and is motivated to quit smoking. We will see her back in 4 weeks at which time we will revisit these items, including potential depilation, and also take preoperative photos.    Total time spent with patient was 30 min, of which greater than 50% was in counseling.    Again, thank you for allowing me to participate in the care of your patient.      Sincerely,    João Díaz MD

## 2017-10-28 NOTE — PROGRESS NOTES
"REFERRING PROVIDER: Tamy    REASON FOR CONSULTATION: Gender dysphoria, requesting \"sex reassignment surgery\"    HPI: 26 year old male to female individual who is referred to us for \"sex reassignment surgery.\" Nay is specifically interested in vaginoplasty. She has already undergone three separate rhinoplasties with the last one in Sep 2016, and breast augmentation in Oct 2016, all with a local plastic surgeon. She is very satisfied with her results and wishes to continue with genital surgery to complete her transition. Reports she \"hates\" her penis and that it disgusts her when she is touched there, making it difficult to have sex. For many years she has been hiding her penis by tucking and wearing compression underwear.    Nay explained to us that she had always felt different as a child. She secretly wore Mom's clothes when she was not around. But it wasn't until she saw a drag performer on TV in 2012 that she realized gender transition could be possible. Although she came out as being huggins at the age of 12, it just never felt right until she admitted she is transgender. She transitioned to the female gender and began hormonal therapy under the guidance of Dr. Almaraz five years ago. She has a supportive transgender . She is here with us with a supportive friend. She has history of working as a .    MEDS:   Prior to Admission medications    Medication Sig Start Date End Date Taking? Authorizing Provider   acyclovir (ZOVIRAX) 400 MG tablet Take 400 mg by mouth 2 times daily   Yes Unknown, Entered By History   chlorproMAZINE (THORAZINE) 25 MG tablet Take 25 mg by mouth 4 times daily   Yes Unknown, Entered By History   diazepam (VALIUM) 10 MG tablet Take 10 mg by mouth 2 times daily   Yes Unknown, Entered By History   FLUoxetine (PROZAC) 20 MG capsule Take 20 mg by mouth daily   Yes Unknown, Entered By History   lithium (ESKALITH/LITHOBID) 300 MG CR tablet Take 900 mg by mouth At Bedtime  " 9/25/17  Yes Tyra Peter, APRN CNP   RisperiDONE (RISPERDAL PO) Take 4 mg by mouth At Bedtime    Yes Reported, Patient   estradiol valerate (DELESTROGEN) 20 MG/ML injection Inject 2 mg into the muscle every 7 days Takes on Sundays    Yes Unknown, Entered By History   emtricitabine-tenofovir (TRUVADA) 200-300 MG per tablet Take 1 tablet by mouth daily    Yes Reported, Patient   fluticasone (FLONASE) 50 MCG/ACT nasal spray Spray 2 sprays into both nostrils every evening    Yes Reported, Patient   loratadine (CLARITIN) 10 MG tablet Take 10 mg by mouth At Bedtime    Yes Reported, Patient       ALLERGIES: No Known Allergies    PMH:   Past Medical History:   Diagnosis Date     Anxiety      Depressive disorder      Male-to-female transgender person    Has history of cirrhosis at age 19 secondary to alcohol abuse, but reports her liver has full recovered since.    PSH:   Past Surgical History:   Procedure Laterality Date     BREAST SURGERY      augmentation     COSMETIC SURGERY      lip and nose nurgeries   Reports history of good response to propofol during surgery.    SH:   Social History   Substance Use Topics     Smoking status: Current Every Day Smoker     Packs/day: 1.00     Smokeless tobacco: Never Used     Alcohol use No   Works as a [employer].    FH: None    ROS: Denies chest pain, shortness of breath, MI, CVA, urinary issues, DVT, PE, and bleeding disorders.    PHYSICAL EXAMINATION: /77  Pulse 80  Wt 162 lb  SpO2 99%  BMI 21.97 kg/m2  Gen: In no acute distress. Multiple piercings and tattoos scattered across her body.  On exam of her penis, shaft skin length is 6.5 cm. It is circumcised. There is a piercing at the frenulum and also in the perineal region. There is intact sensation in the glans. Testes are present but without palpable irregularities. Scrotum without irregularities or lesions.    ASSESSMENT: Gender dysphoria, requesting vaginoplasty.    PLAN: We discussed the vaginoplasty  procedure, the required 1 - 2 weeks hospital inpatient stay, and the need for lifelong dilation or frequent intercourse. Patient will require scrotal skin grafting to augment the neovagina as penile shaft skin is less than 10 cm. She is specifically requesting a large neoclitoris and wishes to bring in reference photos at her next follow up visit. In the meantime, I have asked the patient to prepare two letters of support from her therapists and to also quit smoking. She understands the elective nature of this procedure and is motivated to quit smoking. We will see her back in 4 weeks at which time we will revisit these items, including potential depilation, and also take preoperative photos.    Total time spent with patient was 30 min, of which greater than 50% was in counseling.

## 2017-11-01 ENCOUNTER — MEDICAL CORRESPONDENCE (OUTPATIENT)
Dept: HEALTH INFORMATION MANAGEMENT | Facility: CLINIC | Age: 27
End: 2017-11-01

## 2017-11-14 ENCOUNTER — OFFICE VISIT (OUTPATIENT)
Dept: PLASTIC SURGERY | Facility: CLINIC | Age: 27
End: 2017-11-14

## 2017-11-14 VITALS
TEMPERATURE: 98.2 F | SYSTOLIC BLOOD PRESSURE: 119 MMHG | BODY MASS INDEX: 21.5 KG/M2 | DIASTOLIC BLOOD PRESSURE: 81 MMHG | WEIGHT: 158.7 LBS | HEIGHT: 72 IN | OXYGEN SATURATION: 98 % | HEART RATE: 90 BPM

## 2017-11-14 DIAGNOSIS — F64.0 GENDER DYSPHORIA IN ADULT: Primary | ICD-10-CM

## 2017-11-14 ASSESSMENT — PAIN SCALES - GENERAL: PAINLEVEL: NO PAIN (0)

## 2017-11-14 NOTE — LETTER
11/14/2017       RE: Nay Lainez  [address]  [facility] [address]     Dear Colleague,    Thank you for referring your patient, Nay Lainez, to the [facility] PLASTIC AND RECONSTRUCTIVE SURGERY at [facility]. Please see a copy of my visit note below.    Patient returns for follow up of her gender dysphoria and request for vaginoplasty.    INTERVAL HISTORY: Nay comes in today with her  Luis Antonio and friend Carlos. She continues to be interested in vaginoplasty and has obtained two letters of support. She even brought with her some reference photos of the type of neoclitoris she wishes to have. She reports she has never had any urinary problems. Does not wish to have children in the future and is not interested in sperm banking. Has spoken to Dr. Almaraz regarding post orchiectomy hormone adjustments. Is not interested in preoperative depilation due to pain. Has not yet quit smoking tobacco.    Patient reports the last time she had an orgasm was a month ago. She usually achieves orgasm by stroking her penile shaft.    PHYSICAL EXAMINATION: /81 (BP Location: Left arm, Patient Position: Chair, Cuff Size: Adult Regular)  Pulse 90  Temp 98.2  F (36.8  C) (Oral)  Ht 6'  Wt 158 lb 11.2 oz  SpO2 98%  BMI 21.52 kg/m2  Gen: In no acute distress. Again, multiple tattoos and piercings scattered across her entire body.  Repeat genital exam was performed. This is unchanged from her previous examination. Circumcised penile shaft skin length 6.5 cm. There is a piercing at the frenulum and also at the perineum. The glans is healthy with intact sensation. Bilateral testes are present with no palpable irregularity. Scrotum is small but without any lesion. Suprapubic region is shaved.    ASSESSMENT: Gender dysphoria, requesting vaginoplasty.    PLAN: Today we again discussed the vaginoplasty procedure with reference photos going through the incisions and  postoperative course. She understands the risks involved, including bleeding, infection, injury to surrounding structures including the rectum, urethral issues, neovaginal stenosis, blood clots, scarring, delayed wound healing, and need for revision surgery. Patient was reminded of need for lifelong dilation. I again explained the absolute need for quitting tobacco prior to the procedure. We [facility] where she will be evaluated prior to committing to surgery. In the meantime, we will work on obtaining prior authorization. Patient will see me back once she has completed her evaluation with the [facility] at which time we may check her smoking cessation with a cotinine test. Photos were taken today.    Total time spent with patient was 15 min of which greater than 50% was in counseling.    Again, thank you for allowing me to participate in the care of your patient.      Sincerely,    João Díaz MD

## 2017-11-14 NOTE — NURSING NOTE
Chief Complaint   Patient presents with     Clinic Care Coordination - Follow-up     Return patient.        Vitals:    11/14/17 1657   BP: 119/81   BP Location: Left arm   Patient Position: Chair   Cuff Size: Adult Regular   Pulse: 90   Temp: 98.2  F (36.8  C)   TempSrc: Oral   SpO2: 98%   Weight: 158 lb 11.2 oz   Height: 6'       Body mass index is 21.52 kg/(m^2).    Yara DELGADO LPN

## 2017-11-15 NOTE — PROGRESS NOTES
Patient returns for follow up of her gender dysphoria and request for vaginoplasty.    INTERVAL HISTORY: Nay comes in today with her  Luis Antonio and friend Carlos. She continues to be interested in vaginoplasty and has obtained two letters of support. She even brought with her some reference photos of the type of neoclitoris she wishes to have. She reports she has never had any urinary problems. Does not wish to have children in the future and is not interested in sperm banking. Has spoken to Dr. Almaraz regarding post orchiectomy hormone adjustments. Is not interested in preoperative depilation due to pain. Has not yet quit smoking tobacco.    Patient reports the last time she had an orgasm was a month ago. She usually achieves orgasm by stroking her penile shaft.    PHYSICAL EXAMINATION: /81 (BP Location: Left arm, Patient Position: Chair, Cuff Size: Adult Regular)  Pulse 90  Temp 98.2  F (36.8  C) (Oral)  Ht 6'  Wt 158 lb 11.2 oz  SpO2 98%  BMI 21.52 kg/m2  Gen: In no acute distress. Again, multiple tattoos and piercings scattered across her entire body.  Repeat genital exam was performed. This is unchanged from her previous examination. Circumcised penile shaft skin length 6.5 cm. There is a piercing at the frenulum and also at the perineum. The glans is healthy with intact sensation. Bilateral testes are present with no palpable irregularity. Scrotum is small but without any lesion. Suprapubic region is shaved.    ASSESSMENT: Gender dysphoria, requesting vaginoplasty.    PLAN: Today we again discussed the vaginoplasty procedure with reference photos going through the incisions and postoperative course. She understands the risks involved, including bleeding, infection, injury to surrounding structures including the rectum, urethral issues, neovaginal stenosis, blood clots, scarring, delayed wound healing, and need for revision surgery. Patient was reminded of need for lifelong dilation. I again  explained the absolute need for quitting tobacco prior to the procedure. We will [facility], where she will be evaluated prior to committing to surgery. In the meantime, we will work on obtaining prior authorization. Patient will see me back once she has completed her evaluation with the [facility] at which time we may check her smoking cessation with a cotinine test. Photos were taken today.    Total time spent with patient was 15 min of which greater than 50% was in counseling.

## 2017-11-17 ENCOUNTER — TELEPHONE (OUTPATIENT)
Dept: OTHER | Facility: OUTPATIENT CENTER | Age: 27
End: 2017-11-17

## 2017-11-17 ENCOUNTER — TELEPHONE (OUTPATIENT)
Dept: SURGERY | Facility: CLINIC | Age: 27
End: 2017-11-17

## 2017-11-17 NOTE — TELEPHONE ENCOUNTER
[facility] Telephone Intake    Date:  2017  Client Name:  Nay Lainez  Preferred Name:     MRN:  [id number]   :  1990       Age:  26 year old     Presenting Problem / Reason for Assessment   (Clinical History &Symptoms):     Pt looking for letter of support for vaginoplasty w/ Dr. Díaz. Pt was scheduled for 1 appt, but is aware Dr. Kearns may request follow-up appointment/s to complete letter. Surgery has not been scheduled yet.     Suggested Program:  TG    Seen Other Providers (if so, where):  M.D. :  Dr. Whitehead at [facility]  Therapist:  Yes-Dr. Chelle Ramírez-[facility]  Psychiatrist:  Leatha Gallegos-[facility]    Is presenting concern primarily sexual or mental health:      Couples:  n/a    Medications:    Thorazine  Lithium  Prozac  Ativan  Estradiol valerate-injection  Truvada    Referral Source: Dr. Díaz    Follow Up:    Insurance Benefits to be evaluated.  Note will be entered when validated.    Patient wishes to be contacted regarding Insurance benefits: No    Please Verify Registration    Please send Welcome Packet and document date sent.

## 2017-11-17 NOTE — TELEPHONE ENCOUNTER
PER [facility]U @ [facility] - NO CO-PAY, $4000 IND DEDUCT (0MET) NO CO-INS, W. AN OOPM $4500 (MET) AUTH CALLED IN FOR COLLEEN - SD, , M/C COUSEL EXCLUDED. PATIENT DID NOT WANT TO BE CONTACTED RE INSU.

## 2017-11-22 ENCOUNTER — TELEPHONE (OUTPATIENT)
Dept: SURGERY | Facility: CLINIC | Age: 27
End: 2017-11-22

## 2017-11-28 ENCOUNTER — TELEPHONE (OUTPATIENT)
Dept: SURGERY | Facility: CLINIC | Age: 27
End: 2017-11-28

## 2017-11-28 NOTE — TELEPHONE ENCOUNTER
Received an approval from [employer]-[id number], but location is not correct, it is for [facility].  [email or web address], she took information and will send off to prior authorization team to correct

## 2017-11-29 ENCOUNTER — TELEPHONE (OUTPATIENT)
Dept: SURGERY | Facility: CLINIC | Age: 27
End: 2017-11-29

## 2017-11-29 NOTE — TELEPHONE ENCOUNTER
Received prior authorization approval for surgery with Dr Díaz -[facility]#[id number].  Span date is 11/22/17-11/21/18

## 2017-12-04 ENCOUNTER — TELEPHONE (OUTPATIENT)
Dept: SURGERY | Facility: CLINIC | Age: 27
End: 2017-12-04

## 2017-12-04 NOTE — TELEPHONE ENCOUNTER
Received voice messagel from patient, informed me that I may leave a more detailed message.  So I left a voice message letting her know of the approved PA with [facility]

## 2017-12-11 ENCOUNTER — OFFICE VISIT (OUTPATIENT)
Dept: OTHER | Facility: OUTPATIENT CENTER | Age: 27
End: 2017-12-11

## 2017-12-11 DIAGNOSIS — F60.9 PERSONALITY DISORDER IN ADULT (H): ICD-10-CM

## 2017-12-11 DIAGNOSIS — F25.0 SCHIZOAFFECTIVE DISORDER, BIPOLAR TYPE (H): ICD-10-CM

## 2017-12-11 DIAGNOSIS — F64.0 GENDER DYSPHORIA IN ADOLESCENT AND ADULT: Primary | ICD-10-CM

## 2017-12-11 NOTE — PROGRESS NOTES
[facility]  Program in Human Sexuality  Department of Family Medicine & Community Health  [facility]   [address]               [place] [address]  Phone: [phone]  Fax: [phone]  [email or web address]    Transgender Diagnostic (TG) Diagnostic Assessment Interview  Date of Service: 12/21/17  Client Name: Nay Lainez  YOB: 1990  26 year old  MRN:  [id number]  Gender/Gender Identity: transwoman  Treating Provider: Dr. Dorothea Kearns  Program:  TG  Type of Session: Assessment  Present in Session: patient only  Number of Minutes:  60                         Ethnicity:      Referral Source: Dr. João Díaz, evaluation for gender affirmation surgery    Chief Complaint/Presenting Problem and Goals:    Nay Lainez is a 26 year old transfemale identified  person who is [facility] at the referral of Dr. João Díaz for evaluation prior to gender affirmation surgery, vaiginoplasty. Ms. Lainez currently lives in the [place], [place] with her  and is currently not employed.   Mr. Lainez stated she has received services at the [facility] from [phone] for restrictive eating and was diagnosed with anti-social personality.  She stated she was actively drinking at the time and eating got in the way of her eating patterns. Stated she is currently sober, has been sober since June, 2017.   Ms. Lainez She reported she socially transitioned six years ago. She stated she remembers feeling different since she was a child. She reported she played dress up a lot as a child, and felt more feminine as a child. Stated she came out as a huggins man when she was 12 years old, but it did not feel right. Stated she had known what the term transgender meant but when she saw a show about [employer], at age 20, where one of the contestants came out as a transgender woman that she recognized  herself. Stated she then pursued hormone therapy and began psychotherapy. Stated her parents were initially not supportive, but eventually went to [employer] support group, and they are supportive currently. Stated they have agreed to pay for her surgery co-pay. She stated her sister is supportive. Ms. Lainez stated she legally changed her name in 2017, but has not changed her legal gender marker for reasons to keep her legal marriage rights given the political climate.   Ms. Lainez reported she and her  have been  for three years, after dating for one month. Stated she and her  have six rats.   Ms. Lainez reported she developed a higher level of anatomic dysphoria and hated her annemarie about a year. 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 Stated she and her  had not been sexual in four months because of her dysphoria. Stated she was recently sexual using her current anatomy, and the prospect of having surgery soon has allowed her to use her current sexually. When asked about her feelings about her future sexual functioning, she stated penetrative sex is not a priority. Ms. Lainez reported she is anticipating she feel more physically comfortablae and socially comfortable. She reported she and her , who is also transgender, have discussed their post surgical sexual functioning.   When asked about her aftercare plan, Ms. Lainez reported she had  not thought that far ahead yet.  She stated she is not worried about dilation, and has not had genital hair removal.   Ms. Lainez has been on feminizing hormone therapy since 2011, prescribed by Dr. Landis. She has had breast augmentation two years ago, and several facial feminization surgeries including rhinoplasty and a lower lip lift over the past few years.   Ms. Lainez reported she has been  approved for gender affirmation surgery by her insurance through [employer].     Mental Health History   Ms. Lainez reported she is currently under the care of Dr. Chelle Ramírez for psychotherapy. She sees Dr. Ramírez every two weeks and has been compliant with her tretent plan.  She reported she has seen Dr. Ramírez since 2011 for gender related concerns as well as other mental health concerns. Ms. Lainez reported she has been diagnosed with anti-social personality disorder, schizoaffective disorder, bipolar type and was diagnosed 3-4 months ago when she was hospitalized at [facility] for homicidal ideation after having a psychotic break. Stated she was having hallucinations and had a  psychotic break.  Stated she is currently prescribed 900mg lithium and 125mg thorazine. She stated she has gone through a long list of anti-psychotics and her current regimen is working better than her past medications. She stated her medications are managed by Leatha Jeong NP since January, 2017. She stated she sees her once per month and has been compliant with her medications.     Substance Use:   Nay reported she quit drinking in June, 2017. She reported she is a cigarette smoker, and is quitting on 12/22/17 to help with her recovery frmo surgery.    Medical History   Nay reported she is currently taking Truvada and Acyclovir.       Relationships/Social History    Family History   No covered due to time      Educational History   Not reported.     Occupational history   Nay reported she is currently unemployed and previously worked as a sex worker.     Legal Issues   None reported    _________________________________________________________________________     CONCLUSIONS    Strengths and Liabilities:   Nay is motivated for surgery and engaged in the process. Although she evidences guardedness, she is open about her feelings and willing to cooperate.     Mental Status   Appearance:  Casually  dressed, Well groomed, Dressed appropriately for weather and Appears stated age  Behavior/relationship to examiner/demeanor:  Guarded  Orientation: Oriented to person, place, time and situation  Speech Rate:  Normal  Speech Spontaneity:  Normal  Mood:  calm and neutral  Affect:  Appropriate/mood-congruent  Thought Process (Associations):  Logical, Linear and Goal directed  Thought Content:  no evidence of suicidal or homicidal ideation and no overt psychosis  Abnormal Perception:  None  Attention/Concentration:  Normal  Language:  Intact  Insight:  Adequate  Judgment:  Good      DSM-5 Diagnosis:  Gender dysphoria  Schizoaffective, bipolar type  Unspecified personality disorder      Interactive Complexity  Communication difficulties present during current the psychiatric procedure included:  1. The need to manage maladaptive communication (related to e.g. high anxiety, high reactivity, repeated questions, or disagreement, etc.) among participants that complicated delivery of care.    Conclusions/Recommendations/Initial Treatment Goals:     The client is a 26 year old  person assigned male at birth who identifies as female. Based on the client's current report of symptoms, client meets criteria for gender dysphoria, schizoaffective disorder, bipolar type, and unspecified personality disorder.  The client reports a history of drug/alcohol abuse, and reports she has been sober since June, 2017.  Client denies safety concerns. Based on the client's reported symptoms and impact on functioning, the plan for the patient is:  1. Referral to Dr. Díaz for further consultation. Another appointment may be needed for further assessment for readiness for gender affirmation surgery. Patient signed release of information to contact her treating psychologist, Dr. Chelle Ramírez. Review of the WPATH SOC and current ability to meet the criteria for readiness for surgery should be emphasized. Given the patient's recent mental  health history (recent hospitalization, history of psychiatric symptoms) it may be beneficial for the patient to evidence a period of 3-6 months of psychiatric stability prior to undergoing surgery. It also appears the patient has not fully planned an aftercare plan for surgery, and it would benefit her to consider what her plans are for long term recovery. Additional sessions may be needed to write the referral letter and fully prepare the patient for genital surgery.          Tyra Kearns, PhD, LP    Coordinator, Transgender Health Services  Program in Human Sexuality, [facility]  Department of Family Medicine and Community Health  [facility]

## 2017-12-11 NOTE — MR AVS SNAPSHOT
After Visit Summary   2017    Nay Lainez    MRN: [id number]           Patient Information     Date Of Birth          1990        Visit Information        Provider Department      2017 10:00 AM Tyra Kearns LP [facility]        Today's Diagnoses     Gender dysphoria in adolescent and adult    -  1    Schizoaffective disorder, bipolar type (H)        Personality disorder in adult           Follow-ups after your visit        Who to contact     Please call your clinic at [phone] to:    Ask questions about your health    Make or cancel appointments    Discuss your medicines    Learn about your test results    Speak to your doctor   If you have compliments or concerns about an experience at your clinic, or if you wish to file a complaint, please contact [facility] Patient Relations at [phone] or email us at [email or web address]         Additional Information About Your Visit        MyChart Information     PaperVt is an electronic gateway that provides easy, online access to your medical records. With [employer], you can request a clinic appointment, read your test results, renew a prescription or communicate with your care team.     To sign up for PaperVt visit the website at [email or web address]   You will be asked to enter the access code listed below, as well as some personal information. Please follow the directions to create your username and password.     Your access code is: [email or web address]  Expires: 3/12/2018  4:59 PM     Your access code will  in 90 days. If you need help or a new code, please contact your [facility] or call [phone] for assistance.        Care EveryWhere ID     This is your Care EveryWhere ID. This could be used by other organizations to access your [facility] medical records  [id number]         Blood Pressure from Last 3 Encounters:   17  119/81   10/27/17 108/77   10/23/17 108/62    Weight from Last 3 Encounters:   11/14/17 72 kg (158 lb 11.2 oz)   10/27/17 73.5 kg (162 lb)   10/22/17 73.3 kg (161 lb 9.6 oz)              We Performed the Following     Diagnostic Assessment (complete) [40293]        Primary Care Provider Office Phone # Fax #    Ventura Morelos [phone]       [facility] [place] [address] [facility] [address]        Equal Access to Services     ELIESER CASILLAS : Hadii aad ku hadasho Soomaali, waaxda luqadaha, qaybta kaalmada adebriandayaheri, denver mcdonald . So [facility] [place] [facility] [phone].    ATENCIÓN: Si habla español, tiene a knight disposición servicios gratuitos de asistencia lingüística. Llame al [phone].    We comply with applicable federal civil rights laws and [place] laws. We do not discriminate on the basis of race, color, national origin, age, disability, sex, sexual orientation, or gender identity.            Thank you!     Thank you for choosing [facility]  for your care. Our goal is always to provide you with excellent care. Hearing back from our patients is one way we can continue to improve our services. Please take a few minutes to complete the written survey that you may receive in the mail after your visit with us. Thank you!             Your Updated Medication List - Protect others around you: Learn how to safely use, store and throw away your medicines at www.disposemymeds.org.          This list is accurate as of: 12/11/17 11:59 PM.  Always use your most recent med list.                   Brand Name Dispense Instructions for use Diagnosis    acyclovir 400 MG tablet    ZOVIRAX     Take 400 mg by mouth 2 times daily        chlorproMAZINE 25 MG tablet    THORAZINE     Take 25 mg by mouth 4 times daily        diazepam 10 MG tablet    VALIUM     Take 10 mg by mouth 2 times daily        estradiol valerate 20 MG/ML injection    DELESTROGEN     Inject 2 mg into  the muscle every 7 days Takes on Sundays        fluticasone 50 MCG/ACT spray    FLONASE     Spray 2 sprays into both nostrils every evening        lithium 300 MG CR tablet    ESKALITH/LITHOBID     Take 900 mg by mouth At Bedtime        loratadine 10 MG tablet    CLARITIN     Take 10 mg by mouth At Bedtime        PROZAC 20 MG capsule   Generic drug:  FLUoxetine      Take 20 mg by mouth daily        RISPERDAL PO      Take 4 mg by mouth At Bedtime        TRUVADA 200-300 MG per tablet   Generic drug:  emtricitabine-tenofovir      Take 1 tablet by mouth daily

## 2017-12-12 ENCOUNTER — OFFICE VISIT (OUTPATIENT)
Dept: PLASTIC SURGERY | Facility: CLINIC | Age: 27
End: 2017-12-12

## 2017-12-12 DIAGNOSIS — F64.0 GENDER DYSPHORIA IN ADULT: Primary | ICD-10-CM

## 2017-12-12 NOTE — LETTER
12/12/2017       RE: Nay Lainez  [address]  [place] [place] [address]     Dear Colleague,    Thank you for referring your patient, Nay Lainez, to the [facility] at [facility]. Please see a copy of my visit note below.    Patient returns for follow-up of gender dysphoria.    INTERVAL HISTORY: Patient in the meantime has seen Dr. Dorothea Kearns at the [facility].  Dr. Kearns is willing to potentially draft a letter of support for the patient as long as patient is able to meet with her for more sessions.  Patient reports that she has not quit smoking yet.    PHYSICAL EXAMINATION:  General: In no acute distress.  Mild smell of cigarette smoke.  Physical exam has been deferred today.     ASSESSMENT:  Gender dysphoria, requesting vaginoplasty.    PLAN: We discussed the need for the patient to return and see Dr. Dorothea Kearns.  Patient will undergo full evaluation with Dr. Kearns for potentially a letter of support.  I have reiterated today that patient needs to quit smoking prior to surgery.  Patient will require 4 weeks smoke-free before surgery.  We will consider running a cotinine test.  I will see the patient back once she has been fully evaluated by the [facility].    Total time spent with patient was 15 min of which greater than 50% was in counseling.    Again, thank you for allowing me to participate in the care of your patient.      Sincerely,    João Díaz MD

## 2017-12-12 NOTE — PROGRESS NOTES
Patient returns for follow-up of gender dysphoria.    INTERVAL HISTORY: Patient in the meantime has [facility]  Dr. Kearns is willing to potentially draft a letter of support for the patient as long as patient is able to meet with her for more sessions.  Patient reports that she has not quit smoking yet.    PHYSICAL EXAMINATION:  General: In no acute distress.  Mild smell of cigarette smoke.  Physical exam has been deferred today.     ASSESSMENT:  Gender dysphoria, requesting vaginoplasty.    PLAN: We discussed the need for the patient to return and see Dr. Dorothea Kearns.  Patient will undergo full evaluation with Dr. Kearns for potentially a letter of support.  I have reiterated today that patient needs to quit smoking prior to surgery.  Patient will require 4 weeks smoke-free before surgery.  We will consider running a cotinine test.  I will see the patient back once she has been fully [facility] by the [facility].    Total time spent with patient was 15 min of which greater than 50% was in counseling.

## 2017-12-12 NOTE — MR AVS SNAPSHOT
After Visit Summary   2017    Nay Lainez    MRN: [id number]           Patient Information     Date Of Birth          1990        Visit Information        Provider Department      2017 4:00 PM João Díaz MD [facility] Plastic [facility] Re[facility]ive Surgery        Today's Diagnoses     Gender dysphoria in adult    -  1       Follow-ups after your visit        Who to contact     Please call your clinic at [phone] to:    Ask questions about your health    Make or cancel appointments    Discuss your medicines    Learn about your test results    Speak to your doctor   If you have compliments or concerns about an experience at your clinic, or if you wish to file a complaint, please contact [facility] Patient Relations at [phone] or email us at [email or web address]         Additional Information About Your Visit        MyChart Information     Feidee is an electronic gateway that provides easy, online access to your medical records. With Feidee, you can request a clinic appointment, read your test results, renew a prescription or communicate with your care team.     To sign up for Sojo Studiost visit the website at [email or web address]   You will be asked to enter the access code listed below, as well as some personal information. Please follow the directions to create your username and password.     Your access code is: [email or web address]  Expires: 3/12/2018  4:59 PM     Your access code will  in 90 days. If you need help or a new code, please contact your [facility] Clinic or call [phone] for assistance.        Care EveryWhere ID     This is your Care EveryWhere ID. This could be used by other organizations to access your [place] medical records  [id number]         Blood Pressure from Last 3 Encounters:   17 119/81   10/27/17 108/77   10/23/17 108/62    Weight from Last 3 Encounters:   17 158 lb  11.2 oz   10/27/17 162 lb   10/22/17 161 lb 9.6 oz              Today, you had the following     No orders found for display       Primary Care Provider Office Phone # Fax #    Ventura Morelos [phone]       [facility] [place] [address]  [facility] [address]        Equal Access to Services     ELIESER CASILLAS : Hadii aad ku hadasho Soomaali, waaxda luqadaha, qaybta kaalmada adeegyada, waxay idiin hayaan adeeg khramash lanatachan . So [facility] [phone].    ATENCIÓN: Si habla español, tiene a knight disposición servicios gratuitos de asistencia lingüística. Llame al [phone].    We comply with applicable federal civil rights laws and [place] laws. We do not discriminate on the basis of race, color, national origin, age, disability, sex, sexual orientation, or gender identity.            Thank you!     Thank you for choosing [facility] PLASTIC AND RECONSTRUCTIVE SURGERY  for your care. Our goal is always to provide you with excellent care. Hearing back from our patients is one way we can continue to improve our services. Please take a few minutes to complete the written survey that you may receive in the mail after your visit with us. Thank you!             Your Updated Medication List - Protect others around you: Learn how to safely use, store and throw away your medicines at www.disposemymeds.org.          This list is accurate as of: 12/12/17  4:59 PM.  Always use your most recent med list.                   Brand Name Dispense Instructions for use Diagnosis    acyclovir 400 MG tablet    ZOVIRAX     Take 400 mg by mouth 2 times daily        chlorproMAZINE 25 MG tablet    THORAZINE     Take 25 mg by mouth 4 times daily        diazepam 10 MG tablet    VALIUM     Take 10 mg by mouth 2 times daily        estradiol valerate 20 MG/ML injection    DELESTROGEN     Inject 2 mg into the muscle every 7 days Takes on Sundays        fluticasone 50 MCG/ACT spray    FLONASE     Spray 2 sprays into  both nostrils every evening        lithium 300 MG CR tablet    ESKALITH/LITHOBID     Take 900 mg by mouth At Bedtime        loratadine 10 MG tablet    CLARITIN     Take 10 mg by mouth At Bedtime        PROZAC 20 MG capsule   Generic drug:  FLUoxetine      Take 20 mg by mouth daily        RISPERDAL PO      Take 4 mg by mouth At Bedtime        TRUVADA 200-300 MG per tablet   Generic drug:  emtricitabine-tenofovir      Take 1 tablet by mouth daily

## 2017-12-21 ENCOUNTER — OFFICE VISIT (OUTPATIENT)
Dept: OTHER | Facility: OUTPATIENT CENTER | Age: 27
End: 2017-12-21

## 2017-12-21 DIAGNOSIS — F69 UNSPECIFIED DISORDER OF ADULT PERSONALITY AND BEHAVIOR: ICD-10-CM

## 2017-12-21 DIAGNOSIS — F25.0 SCHIZOAFFECTIVE DISORDER, BIPOLAR TYPE (H): ICD-10-CM

## 2017-12-21 DIAGNOSIS — F64.0 GENDER DYSPHORIA IN ADOLESCENT AND ADULT: Primary | ICD-10-CM

## 2017-12-21 NOTE — MR AVS SNAPSHOT
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           Follow-ups after your visit        Who to contact     Please call your clinic at [phone] to:    Ask questions about your health    Make or cancel appointments    Discuss your medicines    Learn about your test results    Speak to your doctor   If you have compliments or concerns about an experience at your clinic, or if you wish to file a complaint, please contact [facility] Patient Relations at [phone] or email us at [email or web address]         Additional Information About Your Visit        MyChart Information     Clean Energy Systemst is an electronic gateway that provides easy, online access to your medical records. With HelloSign, you can request a clinic appointment, read your test results, renew a prescription or communicate with your care team.     To sign up for [employer] Systemst visit the website at [email or web address]   You will be asked to enter the access code listed below, as well as some personal information. Please follow the directions to create your username and password.     Your access code is: [email or web address]  Expires: 3/12/2018  4:59 PM     Your access code will  in 90 days. If you need help or a new code, please contact your [facility] Phys[facility] Clinic or call [phone] for assistance.        Care EveryWhere ID     This is your Care EveryWhere ID. This could be used by other organizations to access your [place] medical records  [id number]         Blood Pressure from Last 3  Encounters:   11/14/17 119/81   10/27/17 108/77   10/23/17 108/62    Weight from Last 3 Encounters:   11/14/17 72 kg (158 lb 11.2 oz)   10/27/17 73.5 kg (162 lb)   10/22/17 73.3 kg (161 lb 9.6 oz)              We Performed the Following     Individual Psychotherapy (53+ min) [16020]     Psychotherapy Interactive Complexity [28378]        Primary Care Provider Office Phone # Fax #    Ventura Morelos [phone]       [place] [facility] [address] [facility] [address]        Equal Access to Services     ELIESER CASILLAS : Hadii kimberly mercedes hadasho Somartha, waaxda luqadaha, qaybta kaalmada adeegyada, denver winkler. So [facility] [phone].    ATENCIÓN: Si habla español, tiene a knight disposición servicios gratuitos de asistencia lingüística. Ll[facility] [phone].    We comply with applicable federal civil rights laws and [place] laws. We do not discriminate on the basis of race, color, national origin, age, disability, sex, sexual orientation, or gender identity.            Thank you!     Thank you for choosing [facility]  for your care. Our goal is always to provide you with excellent care. Hearing back from our patients is one way we can continue to improve our services. Please take a few minutes to complete the written survey that you may receive in the mail after your visit with us. Thank you!             Your Updated Medication List - Protect others around you: Learn how to safely use, store and throw away your medicines at www.disposemymeds.org.          This list is accurate as of: 12/21/17 11:59 AM.  Always use your most recent med list.                   Brand Name Dispense Instructions for use Diagnosis    acyclovir 400 MG tablet    ZOVIRAX     Take 400 mg by mouth 2 times daily        chlorproMAZINE 25 MG tablet    THORAZINE     Take 25 mg by mouth 4 times daily        diazepam 10 MG tablet    VALIUM     Take 10 mg by mouth 2 times daily         estradiol valerate 20 MG/ML injection    DELESTROGEN     Inject 2 mg into the muscle every 7 days Takes on Sundays        fluticasone 50 MCG/ACT spray    FLONASE     Spray 2 sprays into both nostrils every evening        lithium 300 MG CR tablet    ESKALITH/LITHOBID     Take 900 mg by mouth At Bedtime        loratadine 10 MG tablet    CLARITIN     Take 10 mg by mouth At Bedtime        PROZAC 20 MG capsule   Generic drug:  FLUoxetine      Take 20 mg by mouth daily        RISPERDAL PO      Take 4 mg by mouth At Bedtime        TRUVADA 200-300 MG per tablet   Generic drug:  emtricitabine-tenofovir      Take 1 tablet by mouth daily

## 2017-12-21 NOTE — PROGRESS NOTES
[facility] -  Case Progress Note    Date of Service: Dec 21, 2017  Client Name: Nay Lainez  YOB: 1990  MRN:  [id number]  Treating Provider: Tyra Kearns LP  Type of Session: Individual  Present in Session: patient only   Number of Minutes:  55    Current Symptoms/Status:  History of gender dysphoria, schizoaffective disorder, unspecified personality disorder    Progress Toward Treatment Goals:   Completed evaluation for secondary letter for surgery, compelted consultation with Dr. Díaz    Intervention: Modality and Description:  Psychological interview, assessment, IPT/psychoeducational therapy    Response to Intervention:  Nay stated she had her follow up consult with Dr. Díaz and he had told her she needed a secondary letter and that she needed to quit smoking. Stated he had also told her that the OR will not be ready for a few more months, and she will have to wait. Stated she had swore at him and stormed out of his office. Stated she later had called back and apologized for her outburst. Stated she is frustrated with the process of preparing for surgery and being evaluated. Discussed the [facility] SOC, and the intention of the evaluation process to prepare her for surgery.     Reviewed her psychiatric history and her treatment compliance. Discussed her aftercare plan and her support system. Stated she has her  to take care of her and she will not be working. Stated she will be able to dilate and that she has recovered from surgery well in the past.     Had her complete the BIS and the GDS-GS. (see file) she reports a high level of anatomic dysphoria and desire to change her genitals.     Began letter writing process for her secondary letter and reviewed the next steps. She will return to Dr. Díaz for another consult prior to surgery.     Assignment:  none    Interactive Complexity:  There are four specific communication difficulties that complicate the work of  the primary psychiatric procedure.  Interactive complexity (+42496) may be reported when at least one of these difficulties is present.    Communication difficulties present during current the psychiatric procedure include:  1. The need to manage maladaptive communication (related to e.g. high anxiety, high reactivity, repeated questions, or disagreement) among participants that complicates delivery of care.    Diagnosis:   Gender dysphoria  Schizoaffective disorder - bipolar type  Unspecified personality disorder    Plan / Need for Future Services:  Return for therapy if needed. Continue with care at [facility].       Tyra Kearns, LP

## 2018-01-22 ENCOUNTER — TELEPHONE (OUTPATIENT)
Dept: OTHER | Facility: OUTPATIENT CENTER | Age: 28
End: 2018-01-22

## 2018-01-22 NOTE — TELEPHONE ENCOUNTER
Called and left  for treating psychologist, hCelle Ramírez, at [facility], to get her perspective on Nay's well being and treatment. Asked her to call me back.

## 2018-02-13 ENCOUNTER — CARE COORDINATION (OUTPATIENT)
Dept: PLASTIC SURGERY | Facility: CLINIC | Age: 28
End: 2018-02-13

## 2018-02-13 NOTE — PROGRESS NOTES
Pt called asking when she could schedule her lower surgery.  Discussed with Dr. Díaz.  He would like to plan surgery for April.  I contacted Tiffanie and told her we would aim for April.  We will begin prior authorization and let her know when completed so she can schedule.

## 2018-02-22 DIAGNOSIS — F64.0 GENDER DYSPHORIA IN ADULT: Primary | ICD-10-CM

## 2018-03-06 ENCOUNTER — OFFICE VISIT (OUTPATIENT)
Dept: PLASTIC SURGERY | Facility: CLINIC | Age: 28
End: 2018-03-06

## 2018-03-06 DIAGNOSIS — F64.0 GENDER DYSPHORIA IN ADULT: Primary | ICD-10-CM

## 2018-03-06 NOTE — LETTER
3/6/2018       RE: Nay Lainez  [address]  [facility] [address]     Dear Colleague,    Thank you for referring your patient, Nay Lainez, to the [facility] PLASTIC AND RECONSTRUCTIVE SURGERY at [facility]. Please see a copy of my visit note below.    Patient returns for a follow up visit for gender dysphoria.    INTERVAL HISTORY: [facility] and has received a letter of support from Dr. eKarns. Patient continues to be interested in undergoing vaginoplasty. Has not quit smoking yet.    PHYSICAL EXAMINATION:  General: In no acute distress.  Genital exam was deferred today.    ASSESSMENT: Gender dysphoria, requesting vaginoplasty.    PLAN: We went over the procedure in detail again. Patient understands the gravity of this operation and its irreversible nature. She understands the need for postoperative admission to the inpatient floor and possibly a 1 - 2 week stay depending on the postoperative course. We discussed the risks involved with vaginoplasty again. Patient expressed understanding and her desire to proceed despite the risk of rectal injury, rectovaginal fistula, urinary stream abnormalities, vaginal shrinkage and stenosis, bleeding, infection, and wound healing difficulties in addition to other risks. I recommended smoking cessation. I also explained to her that a cotinine test will be given preop and that surgery cannot be performed with a positive finding. We also discussed the need to halt estradiol hormones 4 weeks prior to surgery to reduce the risk of DVT. We will plan for a May 3, 2018 surgical date, and so April 3, 2018 will be the last date for hormones. All questions were answered.    Total time spent with patient was 15 min of which greater than 50% was in counseling.    Again, thank you for allowing me to participate in the care of your patient.      Sincerely,    João Díaz MD

## 2018-03-06 NOTE — NURSING NOTE
Chief Complaint   Patient presents with     Consult     f/u        There were no vitals filed for this visit.    There is no height or weight on file to calculate BMI.    Terrance MONTANO

## 2018-03-07 ENCOUNTER — HOSPITAL ENCOUNTER (INPATIENT)
Facility: CLINIC | Age: 28
Setting detail: SURGERY ADMIT
End: 2018-03-07
Admitting: PLASTIC SURGERY
Payer: COMMERCIAL

## 2018-03-07 DIAGNOSIS — F64.0 GENDER DYSPHORIA IN ADULT: Primary | ICD-10-CM

## 2018-03-07 NOTE — PROGRESS NOTES
Patient returns for a follow up visit for gender dysphoria.    INTERVAL HISTORY: Patient has [facility] at the [facility] and has received a letter of support from Dr. Kearns. Patient continues to be interested in undergoing vaginoplasty. Has not quit smoking yet.    PHYSICAL EXAMINATION:  General: In no acute distress.  Genital exam was deferred today.    ASSESSMENT: Gender dysphoria, requesting vaginoplasty.    PLAN: We went over the procedure in detail again. Patient understands the gravity of this operation and its irreversible nature. She understands the need for postoperative admission to the inpatient floor and possibly a 1 - 2 week stay depending on the postoperative course. We discussed the risks involved with vaginoplasty again. Patient expressed understanding and her desire to proceed despite the risk of rectal injury, rectovaginal fistula, urinary stream abnormalities, vaginal shrinkage and stenosis, bleeding, infection, and wound healing difficulties in addition to other risks. I recommended smoking cessation. I also explained to her that a cotinine test will be given preop and that surgery cannot be performed with a positive finding. We also discussed the need to halt estradiol hormones 4 weeks prior to surgery to reduce the risk of DVT. We will plan for a May 3, 2018 surgical date, and so April 3, 2018 will be the last date for hormones. All questions were answered.    Total time spent with patient was 15 min of which greater than 50% was in counseling.

## 2018-03-09 ENCOUNTER — TELEPHONE (OUTPATIENT)
Dept: PLASTIC SURGERY | Facility: CLINIC | Age: 28
End: 2018-03-09

## 2018-04-30 ENCOUNTER — TELEPHONE (OUTPATIENT)
Dept: PLASTIC SURGERY | Facility: CLINIC | Age: 28
End: 2018-04-30

## 2018-04-30 NOTE — TELEPHONE ENCOUNTER
Pt called and left Vm asking when she can reschedule vaginoplasty. Writer called back and left VM stating writer sent a message to Dr. Díaz and Kassie, surgery scheduler, to find a date. Writer asked Kassie to call pt when date has been found. Writer asked pt over VM where she is at with smoking cessation and to call back to discuss.

## 2018-05-01 ENCOUNTER — HOSPITAL ENCOUNTER (INPATIENT)
Facility: CLINIC | Age: 28
Setting detail: SURGERY ADMIT
End: 2018-05-01
Admitting: PLASTIC SURGERY
Payer: COMMERCIAL

## 2018-06-04 ENCOUNTER — TELEPHONE (OUTPATIENT)
Dept: PLASTIC SURGERY | Facility: CLINIC | Age: 28
End: 2018-06-04

## 2018-06-04 NOTE — TELEPHONE ENCOUNTER
Pt called stating she is about to take her next hormone shot but wanted to make sure she could do this. Writer asked Florida Garcia, THIERRY care coordinator, who stated yes she can take this hormone shot but no more prior to surgery on 7/12/18. Pt stated she understood.

## 2018-06-20 ENCOUNTER — CARE COORDINATION (OUTPATIENT)
Dept: PLASTIC SURGERY | Facility: CLINIC | Age: 28
End: 2018-06-20

## 2018-06-20 ENCOUNTER — TELEPHONE (OUTPATIENT)
Dept: PLASTIC SURGERY | Facility: CLINIC | Age: 28
End: 2018-06-20

## 2018-06-20 NOTE — PROGRESS NOTES
"Pt called surgery scheduler and cancelled surgery due to \"not being emotionally ready\" for it.  She will call back when she would like to reschedule.  "

## 2018-09-12 ENCOUNTER — TELEPHONE (OUTPATIENT)
Dept: PLASTIC SURGERY | Facility: CLINIC | Age: 28
End: 2018-09-12

## 2018-09-12 DIAGNOSIS — F64.0 GENDER DYSPHORIA IN ADULT: Primary | ICD-10-CM

## 2018-09-12 NOTE — TELEPHONE ENCOUNTER
PT (Nay, She/Her) called back asking if Dr. Díaz would perform or refer her for an orchiectomy. Pt is not ready to undergo vaginoplasty. Writer discussed with Dr. Díaz and instructed to place a [facility] [place]. Kendall. PT has two letters of support from [facility] and [facility] (although addressed to Dr. Díaz).     Plan: [facility] referral placed. Writer to ask Dr. Dorothea Kearns at [facility] to address letter to Dr. Kendall for [facility].

## 2018-09-17 ENCOUNTER — PRE VISIT (OUTPATIENT)
Dept: UROLOGY | Facility: CLINIC | Age: 28
End: 2018-09-17

## 2018-09-17 NOTE — TELEPHONE ENCOUNTER
MEDICAL RECORDS REQUEST   [facility]  Urology Clinic  [address] [place] [address]  PHONE: [phone]  Fax: [phone]        FUTURE VISIT INFORMATION                                                   Nay Lainez, : 1990 scheduled for future visit at [facility]    APPOINTMENT INFORMATION:    Date: 10/16/2018    Provider:  Javid Romano    Reason for Visit/Diagnosis: Consult for Gender    REFERRAL INFORMATION:    Referring provider:  João Díaz    Specialty: MD    Referring providers clinic:  Plastic    Clinic contact number: [phone]     RECORDS REQUESTED FOR VISIT                                                     NOTES  STATUS/DETAILS   OFFICE NOTE from referring provider  yes   OFFICE NOTE from other specialist  yes   DISCHARGE SUMMARY from hospital  no   DISCHARGE REPORT from the ER  no   OPERATIVE REPORT  no   MEDICATION LIST  yes       PRE-VISIT CHECKLIST      Record collection complete Yes   Appointment appropriately scheduled           (right time/right provider) Yes   MyChart activation Yes   Questionnaire complete If no, please explain in process     Completed by: Alyssa Lopes

## 2018-09-21 ENCOUNTER — TELEPHONE (OUTPATIENT)
Dept: UROLOGY | Facility: CLINIC | Age: 28
End: 2018-09-21

## 2018-09-21 NOTE — TELEPHONE ENCOUNTER
I spoke to patient to let the patient know per Dr. Kendall- can do the Orchiectomy @ the ASC under sedation but no local.      Andrea Hernandez MA

## 2018-09-21 NOTE — TELEPHONE ENCOUNTER
[facility] Call Center    Phone Message    May a detailed message be left on voicemail: yes    Reason for Call: Other: Patient wanted to see if it is possible to have upcoming orchiectomy procedure done under local anesthesia verses general anesthesia? Please call patient to discuss.      Action Taken: Message routed to:  [facility] Uro

## 2018-10-05 ENCOUNTER — TELEPHONE (OUTPATIENT)
Dept: PLASTIC SURGERY | Facility: CLINIC | Age: 28
End: 2018-10-05

## 2018-10-05 NOTE — TELEPHONE ENCOUNTER
"Pt wanted to check on her LOS for Urology consult/orchiectomy. Pts [facility] letter needs to be updated since its dated 11/1/17. Pt stated she would work on getting that updated and try to bring new letter into Urology consult on 10/16/18.  [facility] at [facility] needs to update language \"Dr. Díaz\" and \"Vaginoplasty\" - writer stated he would send message to Dr. Kearns to see how she would like to proceed with updating letter.       "

## 2018-10-08 ENCOUNTER — MEDICAL CORRESPONDENCE (OUTPATIENT)
Dept: HEALTH INFORMATION MANAGEMENT | Facility: CLINIC | Age: 28
End: 2018-10-08

## 2018-10-09 ENCOUNTER — PRE VISIT (OUTPATIENT)
Dept: UROLOGY | Facility: CLINIC | Age: 28
End: 2018-10-09

## 2018-10-09 NOTE — TELEPHONE ENCOUNTER
Patient with history of gender dysphoria coming in for orchiectomy consult. Patient chart reviewed, no need for call, all records available and ready for appointment.

## 2018-10-16 ENCOUNTER — ALLIED HEALTH/NURSE VISIT (OUTPATIENT)
Dept: UROLOGY | Facility: CLINIC | Age: 28
End: 2018-10-16

## 2018-10-16 ENCOUNTER — OFFICE VISIT (OUTPATIENT)
Dept: UROLOGY | Facility: CLINIC | Age: 28
End: 2018-10-16

## 2018-10-16 VITALS
DIASTOLIC BLOOD PRESSURE: 66 MMHG | HEART RATE: 88 BPM | SYSTOLIC BLOOD PRESSURE: 100 MMHG | BODY MASS INDEX: 19.14 KG/M2 | WEIGHT: 141.3 LBS | HEIGHT: 72 IN

## 2018-10-16 DIAGNOSIS — F64.0 GENDER DYSPHORIA IN ADULT: Primary | ICD-10-CM

## 2018-10-16 RX ORDER — CEFAZOLIN SODIUM 1 G/50ML
1 INJECTION, SOLUTION INTRAVENOUS SEE ADMIN INSTRUCTIONS
Status: CANCELLED | OUTPATIENT
Start: 2018-10-16

## 2018-10-16 RX ORDER — CEFAZOLIN SODIUM 2 G/50ML
2 SOLUTION INTRAVENOUS
Status: CANCELLED | OUTPATIENT
Start: 2018-10-16

## 2018-10-16 ASSESSMENT — ENCOUNTER SYMPTOMS
TINGLING: 0
INCREASED ENERGY: 0
FEVER: 0
WEAKNESS: 0
ALTERED TEMPERATURE REGULATION: 0
PARALYSIS: 0
DEPRESSION: 1
NUMBNESS: 0
DECREASED APPETITE: 0
WEIGHT GAIN: 1
PANIC: 1
LOSS OF CONSCIOUSNESS: 0
TREMORS: 0
INSOMNIA: 1
DIZZINESS: 0
MEMORY LOSS: 1
DECREASED CONCENTRATION: 1
HEADACHES: 0
POLYPHAGIA: 1
POLYDIPSIA: 0
DISTURBANCES IN COORDINATION: 1
NIGHT SWEATS: 0
FATIGUE: 1
SPEECH CHANGE: 1
CHILLS: 0
SEIZURES: 0
HALLUCINATIONS: 1
NERVOUS/ANXIOUS: 1
WEIGHT LOSS: 0

## 2018-10-16 ASSESSMENT — PAIN SCALES - GENERAL: PAINLEVEL: NO PAIN (0)

## 2018-10-16 NOTE — LETTER
10/16/2018       RE: Nay Lainez  [address]  [facility] [address]     Dear Colleague,    Thank you for referring your patient, Nay Lainez, to the [facility] UROLOGY AND INST FOR PROSTATE AND UROLOGIC CANCERS at [facility]. Please see a copy of my visit note below.    [facility] Consult H&P    Name: Nay Lainez    MRN: [id number]   YOB: 1990                 Chief Complaint:   Gender Dysphoria          History of Present Illness:   Nay Lainez is a 27 year old transgender female  seen in consultation for gender dysphoria    Patient has been living as a female for 6 years  Preferred pronouns are: her/hers (female)  The patient has been on exogenous hormones since: estrogen, spironolactone.  In terms of an intimate relationship, the patient is  to a transmale.  In terms of fertility, the patient: no interested in kids.    The patient has obtained 2 letters of support. They are in EPIC. They are adequate.    She has schizophrenia but is well controlled. No hospitalization or ER visit in > 1 year.    The patient has previously undergone breast augmentation, rhinoplasty, upper lip lift and lower lip tuck - Dr. Paul/Manjit @ [facility]. No complaints     Long-term surgical goals for the patient include: Orchiectomy and Vaginoplasty. Want to think a little further and delay vaginoplasty until we are more experienced.    The patient is here today expressing interest in Orchiectomy.         Past Medical History:     Past Medical History:   Diagnosis Date     Anxiety      Depressive disorder      Male-to-female transgender person             Past Surgical History:     Past Surgical History:   Procedure Laterality Date     BREAST SURGERY      augmentation     COSMETIC SURGERY      lip and nose nurgeries            Social History:     Social History   Substance Use Topics     Smoking status: Current  Every Day Smoker     Packs/day: 1.00     Types: Cigarettes     Smokeless tobacco: Never Used     Alcohol use No            Family History:   None relevant         Allergies:   No Known Allergies         Medications:     Current Outpatient Prescriptions   Medication Sig     acyclovir (ZOVIRAX) 400 MG tablet Take 400 mg by mouth 2 times daily     chlorproMAZINE (THORAZINE) 25 MG tablet Take 25 mg by mouth 4 times daily     diazepam (VALIUM) 10 MG tablet Take 10 mg by mouth 2 times daily     emtricitabine-tenofovir (TRUVADA) 200-300 MG per tablet Take 1 tablet by mouth daily      estradiol valerate (DELESTROGEN) 20 MG/ML injection Inject 2 mg into the muscle every 7 days Takes on Sundays      FLUoxetine (PROZAC) 20 MG capsule Take 20 mg by mouth daily     fluticasone (FLONASE) 50 MCG/ACT nasal spray Spray 2 sprays into both nostrils every evening      lithium (ESKALITH/LITHOBID) 300 MG CR tablet Take 900 mg by mouth At Bedtime      loratadine (CLARITIN) 10 MG tablet Take 10 mg by mouth At Bedtime      RisperiDONE (RISPERDAL PO) Take 4 mg by mouth At Bedtime      No current facility-administered medications for this visit.           Physical Exam:   /66  Pulse 88  Ht 1.829 m (6')  Wt 64.1 kg (141 lb 4.8 oz)  BMI 19.16 kg/m2  General: age-appropriate in NAD  HEENT: Head AT/NC, EOMI, CN Grossly intact  Resp: no respiratory distress, lung sounds clear.  CV: heart rate regular, S1, S2.  Lymph: No cervical, supraclavicular or axillary lymphadenopathy  Back: bony spine is non-tender, flanks are nontender  Abdomen: not obese, soft, non-distended, non-tender. No organomegaly  : testicles normal without atrophy or masses and penis normal without urethral discharge  LE: no edema.   Neuro: grossly intact  Motor: excellent strength throughout  Skin: clear of rashes or ecchymoses.          Outside records:    I spent 10 minutes reviewing records.         Assessment and Plan:   27 year old transgender female with gender  dysphoria    FOR TRANSGENDER ORCHIECTOMY PATIENTS:  The criteria for genital surgery are specific to the type of surgery being requested.  Criteria for orchiectomy in MtF patients (WPATH version 7)    1. Persistent, well documented gender dysphoria;  2. Capacity to make a fully informed decision and to consent for treatment;  3. Age of consent (>18 years old)  4. If significant medical or mental health concerns are present, they must be well controlled.  5. 12 continuous months of hormone therapy as appropriate to the patient s gender goals (unless  the patient has a medical contraindication or is otherwise unable or unwilling to take  Hormones).  6. Two letters of support    The aim of hormone therapy prior to gonadectomy is primarily to introduce a period of reversible  estrogen or testosterone suppression, before the patient undergoes irreversible surgical intervention.    She meets all criteria today.    I reviewed the steps of orchiectomy. I reviewed the surgical procedure. I reviewed the risks and benefits including bleeding, infection and irreversible nature of the procedure.     Ultimately, she desires vaginoplasty but she wants to wait longer until more have been done at this center.    Javid Kendall MD   Reconstructive Urology  [facility]

## 2018-10-16 NOTE — PROGRESS NOTES
Pre Op Teaching Flowsheet       Pre and Post op Patient Education  Relevant Diagnosis:  Gender Dysphoria in adult  Teaching Topic:  Pre and post op teaching for Bilateral orchiectomy  Person Involved in teaching:  Nay Lainez      Motivation Level:  Asks Questions: Yes  Eager to Learn:  Yes  Cooperative: Yes  Receptive (willing/able to accept information):  Yes  Patient demonstrates understanding of the following:  Date and time of surgery:  11/2/18 at 0930  Location of surgery: [facility]- 5th Floor  History and Physical and any other testing necessary prior to surgery: Yes  Required time line for completion of History and Physical and any pre-op testing: Yes    NPO Guidelines: NPO per Anesthesia Guidelines    Patient demonstrates understanding of the following:  Patient understands the need for a responsible adult to drive them home and someone to stay with them for the first 24 hours post-operatively: YES   Pre-op bowel prep: No, explain  Pre-op showering/scrub information with Hibiclens Soap: Yes  Medications to take the day of surgery:  Per PCP  Blood thinner medications discussed and when to stop (if applicable):  Yes  Diabetes medication management (if applicable):  N/A  Discussed pain control after surgery: pain scale, pain medications and pain management techniques  Infection Prevention: Patient demonstrates understanding of the following:  Patient instructed on hand hygiene:  Yes  Surgical procedure site care taught: Yes  Signs and symptoms of infection taught:  Yes  Wound care will be taught at the time of discharge.  Central venous catheter care will be taught at the time of discharge (if applicable).    Post-op follow-up:  Discussed how to contact the hospital, nurse, and clinic scheduling staff if necessary.    Instructional materials used/given/mailed:  [facility] Booklet, post op teaching sheet, Map, Soap, and arrival/location information.    Surgical  instructions given to patient in clinic: Yes.    Instructional Materials given:  Before your surgery packet , Medications to avoid before surgery , Showering or Bathing instructions before surgery  and What to expect after surgery    Post-op appointment/testing scheduled per MD orders: Yes    Total time with patient: 10 minutes    Raven Acevedo RN, BSN  Urology Care Coordinator

## 2018-10-16 NOTE — MR AVS SNAPSHOT
After Visit Summary   10/16/2018    Nay Lainez    MRN: [id number]           Patient Information     Date Of Birth          1990        Visit Information        Provider Department      10/16/2018 1:00 PM Javid Kendall MD [facility]rology and [facility] for Prostate and Urologic Cancers        Today's Diagnoses     Gender dysphoria in adult    -  1       Follow-ups after your visit        Your next 10 appointments already scheduled     2018  8:45 AM CST   (Arrive by 8:30 AM)   Post-Op with Javid Kendall MD   [facility] and [facility] for Prostate and Urologic Cancers ([facility] [place])    [address] [place] [address] [phone]              Who to contact     Please call your clinic at [phone] to:    Ask questions about your health    Make or cancel appointments    Discuss your medicines    Learn about your test results    Speak to your doctor            Additional Information About Your Visit        MyChart Information     XenSourcet is an electronic gateway that provides easy, online access to your medical records. With Towandas book, you can request a clinic appointment, read your test results, renew a prescription or communicate with your care team.     To sign up for XenSourcet visit the website at [email or web address]   You will be asked to enter the access code listed below, as well as some personal information. Please follow the directions to create your username and password.     Your access code is: [email or web address]  Expires: 2018  6:31 AM     Your access code will  in 90 days. If you need help or a new code, please contact your [facility] or call [phone] for assistance.        Care EveryWhere ID     This is your Care EveryWhere ID. This could be used by other organizations to access your [place] medical records  [id number]        Your Vitals Were     Pulse Height BMI (Body Mass  Index)             88 1.829 m (6') 19.16 kg/m2          Blood Pressure from Last 3 Encounters:   10/16/18 100/66   11/14/17 119/81   10/27/17 108/77    Weight from Last 3 Encounters:   10/16/18 64.1 kg (141 lb 4.8 oz)   11/14/17 72 kg (158 lb 11.2 oz)   10/27/17 73.5 kg (162 lb)              We Performed the Following     Nikole-Operative Worksheet  (Urology General)        Primary Care Provider Office Phone # Fax #    Ventura Morelos [phone]       [facility] [address] [facility] [address]        Equal Access to Services     ELIESER CASILLAS : Hadii kimberly mercedes hadasho Somartha, waaxda luqadaha, qaybta kaalmada adeegyada, denver jassoin vania mcdonald . So [facility] [phone].    ATENCIÓN: Si habla español, tiene a knight disposición servicios gratuitos de asistencia lingüística. Llame al [phone].    We comply with applicable federal civil rights laws and [place] laws. We do not discriminate on the basis of race, color, national origin, age, disability, sex, sexual orientation, or gender identity.            Thank you!     Thank you for choosing [facility] UROLOGY AND [facility] FOR PROSTATE AND UROLOGIC CANCERS  for your care. Our goal is always to provide you with excellent care. Hearing back from our patients is one way we can continue to improve our services. Please take a few minutes to complete the written survey that you may receive in the mail after your visit with us. Thank you!             Your Updated Medication List - Protect others around you: Learn how to safely use, store and throw away your medicines at www.disposemymeds.org.          This list is accurate as of 10/16/18  1:53 PM.  Always use your most recent med list.                   Brand Name Dispense Instructions for use Diagnosis    acyclovir 400 MG tablet    ZOVIRAX     Take 400 mg by mouth 2 times daily        chlorproMAZINE 25 MG tablet    THORAZINE     Take 25 mg by mouth 4 times daily         diazepam 10 MG tablet    VALIUM     Take 10 mg by mouth 2 times daily        estradiol valerate 20 MG/ML injection    DELESTROGEN     Inject 2 mg into the muscle every 7 days Takes on Sundays        fluticasone 50 MCG/ACT spray    FLONASE     Spray 2 sprays into both nostrils every evening        lithium 300 MG CR tablet    ESKALITH/LITHOBID     Take 900 mg by mouth At Bedtime        loratadine 10 MG tablet    CLARITIN     Take 10 mg by mouth At Bedtime        PROZAC 20 MG capsule   Generic drug:  FLUoxetine      Take 20 mg by mouth daily        RISPERDAL PO      Take 4 mg by mouth At Bedtime        TRUVADA 200-300 MG per tablet   Generic drug:  emtricitabine-tenofovir      Take 1 tablet by mouth daily

## 2018-10-16 NOTE — MR AVS SNAPSHOT
After Visit Summary   10/16/2018    Nay Lainez    MRN: [id number]           Patient Information     Date Of Birth          1990        Visit Information        Provider Department      10/16/2018 2:00 PM Nurse, [facility] and [facility] for Prostate and Urologic Cancers        Today's Diagnoses     Gender dysphoria in adult    -  1       Follow-ups after your visit        Your next 10 appointments already scheduled     Nov 02, 2018   Procedure with Javid Kendall MD   [facility] ([facility] [place])    [address] [place] [address]  [address] [facility] [id number] [phone]           Located in the Clinics and Surgery Center at [address] [place] [address].   parking is very convenient and highly recommended.  is a $6 flat rate fee.  Both  and self parkers should enter the main arrival plaza from [facility]; parking attendants will direct you based on your parking preference.            Nov 20, 2018  8:45 AM CST   (Arrive by 8:30 AM)   Post-Op with Javid Kendall MD   [facility]rology and [facility] for Prostate and Urologic Cancers ([facility])    [address] [place] [address]  [address] [facility] [id number] [phone]              Who to contact     Please call your clinic at [phone] to:    Ask questions about your health    Make or cancel appointments    Discuss your medicines    Learn about your test results    Speak to your doctor            Additional Information About Your Visit        MyChart Information     360SHOPt is an electronic gateway that provides easy, online access to your medical records. With EduKoala, you can request a clinic appointment, read your test results, renew a prescription or communicate with your care team.     To sign up for 360SHOPt visit the website at [email or web address]   You will be asked to enter the access code listed below,  as well as some personal information. Please follow the directions to create your username and password.     Your access code is: [email or web address]  Expires: 2018  6:31 AM     Your access code will  in 90 days. If you need help or a new code, please contact your [facility] or call [phone] for assistance.        Care EveryWhere ID     This is your Care EveryWhere ID. This could be used by other organizations to access your [place] medical records  [id number]         Blood Pressure from Last 3 Encounters:   10/16/18 100/66   17 119/81   10/27/17 108/77    Weight from Last 3 Encounters:   10/16/18 64.1 kg (141 lb 4.8 oz)   17 72 kg (158 lb 11.2 oz)   10/27/17 73.5 kg (162 lb)              Today, you had the following     No orders found for display       Primary Care Provider Office Phone # Fax #    Ventura Morelos [phone]       [facility] [place] [address] [place] [address]        Equal Access to Services     ELIESER CASILLAS : Hadii aad ku hadasho Soomaali, waaxda luqadaha, qaybta kaalmada adeegyada, denver mcdonald . So [facility] [phone].    ATENCIÓN: Si habla español, tiene a knight disposición servicios gratuitos de asistencia lingüística. Jo Anname al [phone].    We comply with applicable federal civil rights laws and [place] laws. We do not discriminate on the basis of race, color, national origin, age, disability, sex, sexual orientation, or gender identity.            Thank you!     Thank you for choosing [facility] UROLOGY AND [facility] FOR PROSTATE AND UROLOGIC CANCERS  for your care. Our goal is always to provide you with excellent care. Hearing back from our patients is one way we can continue to improve our services. Please take a few minutes to complete the written survey that you may receive in the mail after your visit with us. Thank you!             Your Updated Medication List - Protect  others around you: Learn how to safely use, store and throw away your medicines at www.disposemymeds.org.          This list is accurate as of 10/16/18  2:13 PM.  Always use your most recent med list.                   Brand Name Dispense Instructions for use Diagnosis    acyclovir 400 MG tablet    ZOVIRAX     Take 400 mg by mouth 2 times daily        chlorproMAZINE 25 MG tablet    THORAZINE     Take 25 mg by mouth 4 times daily        diazepam 10 MG tablet    VALIUM     Take 10 mg by mouth 2 times daily        estradiol valerate 20 MG/ML injection    DELESTROGEN     Inject 2 mg into the muscle every 7 days Takes on Sundays        fluticasone 50 MCG/ACT spray    FLONASE     Spray 2 sprays into both nostrils every evening        lithium 300 MG CR tablet    ESKALITH/LITHOBID     Take 900 mg by mouth At Bedtime        loratadine 10 MG tablet    CLARITIN     Take 10 mg by mouth At Bedtime        PROZAC 20 MG capsule   Generic drug:  FLUoxetine      Take 20 mg by mouth daily        RISPERDAL PO      Take 4 mg by mouth At Bedtime        TRUVADA 200-300 MG per tablet   Generic drug:  emtricitabine-tenofovir      Take 1 tablet by mouth daily

## 2018-10-23 ENCOUNTER — TELEPHONE (OUTPATIENT)
Dept: UROLOGY | Facility: CLINIC | Age: 28
End: 2018-10-23

## 2018-10-23 NOTE — TELEPHONE ENCOUNTER
[facility] Call Center    Phone Message    May a detailed message be left on voicemail: yes    Reason for Call: Other: Pt is wondering if her insurance has been preapproved for surgery. I told Pt to check with her insurance but that i would send a message as well. If there any information regarding pre approval, Please call Pt back.      Action Taken: Message routed to:  [facility] ([facility]): URO

## 2018-10-23 NOTE — TELEPHONE ENCOUNTER
Message forwarded to Annika Whiting financial counselor to assist patient.    Raven Acevedo RN, BSN  Care Coordinator- Reconstructive Urology

## 2018-10-23 NOTE — TELEPHONE ENCOUNTER
[facility] Call Center    Phone Message    May a detailed message be left on voicemail: yes    Reason for Call: Other: Pt called her insurance, and was told that they had not received the Prior Authorization. Please call pt to discuss. Pt is having surgery on 11/2, and stated her insurance requires this ASAP. Pt did not have fax number, but had a phone number: [phone], Pre-Certification Line. Pt stated when it is sent it needs to be marked 'Urgent'.     Action Taken: Message routed to:  [facility] ([facility]): Urology

## 2018-11-01 ENCOUNTER — ANESTHESIA EVENT (OUTPATIENT)
Dept: SURGERY | Facility: AMBULATORY SURGERY CENTER | Age: 28
End: 2018-11-01

## 2018-11-01 NOTE — ANESTHESIA PREPROCEDURE EVALUATION
Anesthesia Pre-Procedure Evaluation    Patient: Nay Lainez   MRN:     [id number] Gender:   male   Age:    27 year old :      1990        Preoperative Diagnosis: Gender Dysphoria   Procedure(s):  Bilateral Orchiectomy     Past Medical History:   Diagnosis Date     Anxiety      Depressive disorder      Male-to-female transgender person       Past Surgical History:   Procedure Laterality Date     BREAST SURGERY      augmentation     COSMETIC SURGERY      lip and nose nurgeries          Anesthesia Evaluation     .             ROS/MED HX    ENT/Pulmonary:  - neg pulmonary ROS     Neurologic:  - neg neurologic ROS     Cardiovascular:         METS/Exercise Tolerance:  >4 METS   Hematologic:  - neg hematologic  ROS       Musculoskeletal:  - neg musculoskeletal ROS       GI/Hepatic:  - neg GI/hepatic ROS       Renal/Genitourinary:  - ROS Renal section negative       Endo:  - neg endo ROS       Psychiatric: Comment: ADD, PD    (+) psychiatric history anxiety, bipolar, depression, schizophrenia and other (comment)      Infectious Disease:  - neg infectious disease ROS       Malignancy:      - no malignancy   Other:    - neg other ROS                 [facility] AN PHYSICAL EXAM    Lab Results   Component Value Date    WBC 5.4 2017    HGB 14.8 2017    HCT 41.5 2017     2017     2017    POTASSIUM 4.1 2017    CHLORIDE 109 2017    CO2 22 2017    BUN 9 2017    CR 0.73 2017    GLC 89 2017    CARRIE 8.5 2017    PHOS 3.7 03/10/2017    MAG 2.0 03/10/2017    ALBUMIN 3.6 2017    PROTTOTAL 6.7 (L) 2017    ALT 23 2017    AST 15 2017    ALKPHOS 102 2017    BILITOTAL 0.5 2017    TSH 0.57 2017    HCG Negative 10/22/2017       Preop Vitals  BP Readings from Last 3 Encounters:   10/16/18 100/66   17 119/81   10/27/17 108/77    Pulse Readings from Last 3 Encounters:   10/16/18 88   17 90   10/27/17 80       Resp Readings from Last 3 Encounters:   10/23/17 16   09/25/17 17   08/20/17 16    SpO2 Readings from Last 3 Encounters:   11/14/17 98%   10/27/17 99%   10/23/17 91%      Temp Readings from Last 1 Encounters:   11/14/17 36.8  C (98.2  F) (Oral)    Ht Readings from Last 1 Encounters:   10/16/18 1.829 m (6')      Wt Readings from Last 1 Encounters:   10/16/18 64.1 kg (141 lb 4.8 oz)    Estimated body mass index is 19.16 kg/(m^2) as calculated from the following:    Height as of 10/16/18: 1.829 m (6').    Weight as of 10/16/18: 64.1 kg (141 lb 4.8 oz).     LDA:            Assessment:   ASA SCORE: 2    NPO Status: > 6 hours since completed Solid Foods   Documentation: H&P complete; Preop Testing complete; Consents complete   Proceeding: Proceed without further delay  Tobacco Use:  NO Active use of Tobacco/UNKNOWN Tobacco use status     Plan:   Anes. Type:  General   Pre-Induction: Midazolam IV; Acetaminophen PO   Induction:  IV (Standard)   Airway: LMA   Access/Monitoring: PIV   Maintenance: Balanced   Emergence: Procedure Site   Logistics: Same Day Surgery     Postop Pain/Sedation Strategy:  Standard-Options: Opioids PRN     PONV Management:  Adult Risk Factors:, Non-Smoker, Postop Opioids  Prevention: Ondansetron; Dexamethasone     CONSENT: Direct conversation   Plan and risks discussed with: Patient   Blood Products: Consent Deferred (Minimal Blood Loss)     Comments for Plan/Consent:  Plan:  GA with routine monitors    MD Rogerio Hammond MD

## 2018-11-02 ENCOUNTER — HOSPITAL ENCOUNTER (OUTPATIENT)
Facility: AMBULATORY SURGERY CENTER | Age: 28
End: 2018-11-02
Attending: UROLOGY

## 2018-11-02 ENCOUNTER — ANESTHESIA (OUTPATIENT)
Dept: SURGERY | Facility: AMBULATORY SURGERY CENTER | Age: 28
End: 2018-11-02

## 2018-11-02 ENCOUNTER — SURGERY (OUTPATIENT)
Age: 28
End: 2018-11-02

## 2018-11-02 VITALS
TEMPERATURE: 98.6 F | SYSTOLIC BLOOD PRESSURE: 99 MMHG | OXYGEN SATURATION: 96 % | RESPIRATION RATE: 16 BRPM | DIASTOLIC BLOOD PRESSURE: 68 MMHG

## 2018-11-02 DIAGNOSIS — F64.9 GENDER DYSPHORIA: Primary | ICD-10-CM

## 2018-11-02 RX ORDER — FENTANYL CITRATE 50 UG/ML
25-50 INJECTION, SOLUTION INTRAMUSCULAR; INTRAVENOUS
Status: DISCONTINUED | OUTPATIENT
Start: 2018-11-02 | End: 2018-11-03 | Stop reason: HOSPADM

## 2018-11-02 RX ORDER — ACETAMINOPHEN 325 MG/1
975 TABLET ORAL ONCE
Status: DISCONTINUED | OUTPATIENT
Start: 2018-11-02 | End: 2018-11-02 | Stop reason: HOSPADM

## 2018-11-02 RX ORDER — ONDANSETRON 4 MG/1
4 TABLET, ORALLY DISINTEGRATING ORAL EVERY 30 MIN PRN
Status: DISCONTINUED | OUTPATIENT
Start: 2018-11-02 | End: 2018-11-03 | Stop reason: HOSPADM

## 2018-11-02 RX ORDER — DIAZEPAM 10 MG/2ML
2.5 INJECTION, SOLUTION INTRAMUSCULAR; INTRAVENOUS
Status: DISCONTINUED | OUTPATIENT
Start: 2018-11-02 | End: 2018-11-02 | Stop reason: HOSPADM

## 2018-11-02 RX ORDER — SODIUM CHLORIDE, SODIUM LACTATE, POTASSIUM CHLORIDE, CALCIUM CHLORIDE 600; 310; 30; 20 MG/100ML; MG/100ML; MG/100ML; MG/100ML
INJECTION, SOLUTION INTRAVENOUS CONTINUOUS
Status: DISCONTINUED | OUTPATIENT
Start: 2018-11-02 | End: 2018-11-03 | Stop reason: HOSPADM

## 2018-11-02 RX ORDER — HYDROCODONE BITARTRATE AND ACETAMINOPHEN 5; 325 MG/1; MG/1
2 TABLET ORAL ONCE
Status: DISCONTINUED | OUTPATIENT
Start: 2018-11-02 | End: 2018-11-03 | Stop reason: HOSPADM

## 2018-11-02 RX ORDER — LIDOCAINE 40 MG/G
CREAM TOPICAL
Status: DISCONTINUED | OUTPATIENT
Start: 2018-11-02 | End: 2018-11-02 | Stop reason: HOSPADM

## 2018-11-02 RX ORDER — GABAPENTIN 300 MG/1
300 CAPSULE ORAL ONCE
Status: DISCONTINUED | OUTPATIENT
Start: 2018-11-02 | End: 2018-11-02 | Stop reason: HOSPADM

## 2018-11-02 RX ORDER — AMOXICILLIN 250 MG
1-2 CAPSULE ORAL 2 TIMES DAILY
Qty: 30 TABLET | Refills: 0 | Status: SHIPPED | OUTPATIENT
Start: 2018-11-02 | End: 2018-11-07

## 2018-11-02 RX ORDER — MEPERIDINE HYDROCHLORIDE 25 MG/ML
12.5 INJECTION INTRAMUSCULAR; INTRAVENOUS; SUBCUTANEOUS
Status: DISCONTINUED | OUTPATIENT
Start: 2018-11-02 | End: 2018-11-03 | Stop reason: HOSPADM

## 2018-11-02 RX ORDER — OXYCODONE HCL 5 MG/5 ML
5 SOLUTION, ORAL ORAL EVERY 4 HOURS PRN
Status: DISCONTINUED | OUTPATIENT
Start: 2018-11-02 | End: 2018-11-03 | Stop reason: HOSPADM

## 2018-11-02 RX ORDER — ONDANSETRON 2 MG/ML
4 INJECTION INTRAMUSCULAR; INTRAVENOUS EVERY 30 MIN PRN
Status: DISCONTINUED | OUTPATIENT
Start: 2018-11-02 | End: 2018-11-03 | Stop reason: HOSPADM

## 2018-11-02 RX ORDER — PROPOFOL 10 MG/ML
INJECTION, EMULSION INTRAVENOUS CONTINUOUS PRN
Status: DISCONTINUED | OUTPATIENT
Start: 2018-11-02 | End: 2018-11-02

## 2018-11-02 RX ORDER — NALOXONE HYDROCHLORIDE 0.4 MG/ML
.1-.4 INJECTION, SOLUTION INTRAMUSCULAR; INTRAVENOUS; SUBCUTANEOUS
Status: DISCONTINUED | OUTPATIENT
Start: 2018-11-02 | End: 2018-11-03 | Stop reason: HOSPADM

## 2018-11-02 RX ORDER — FENTANYL CITRATE 50 UG/ML
25-50 INJECTION, SOLUTION INTRAMUSCULAR; INTRAVENOUS
Status: DISCONTINUED | OUTPATIENT
Start: 2018-11-02 | End: 2018-11-02 | Stop reason: HOSPADM

## 2018-11-02 RX ORDER — ACETAMINOPHEN 325 MG/1
650 TABLET ORAL ONCE
Status: DISCONTINUED | OUTPATIENT
Start: 2018-11-02 | End: 2018-11-03 | Stop reason: HOSPADM

## 2018-11-02 RX ORDER — PROPOFOL 10 MG/ML
INJECTION, EMULSION INTRAVENOUS PRN
Status: DISCONTINUED | OUTPATIENT
Start: 2018-11-02 | End: 2018-11-02

## 2018-11-02 RX ORDER — ACETAMINOPHEN 325 MG/1
975 TABLET ORAL ONCE
Status: COMPLETED | OUTPATIENT
Start: 2018-11-02 | End: 2018-11-02

## 2018-11-02 RX ORDER — HYDROCODONE BITARTRATE AND ACETAMINOPHEN 5; 325 MG/1; MG/1
1-2 TABLET ORAL EVERY 4 HOURS PRN
Qty: 15 TABLET | Refills: 0 | Status: SHIPPED | OUTPATIENT
Start: 2018-11-02 | End: 2018-11-07

## 2018-11-02 RX ORDER — CEFAZOLIN SODIUM 2 G/50ML
2 SOLUTION INTRAVENOUS
Status: COMPLETED | OUTPATIENT
Start: 2018-11-02 | End: 2018-11-02

## 2018-11-02 RX ORDER — GABAPENTIN 300 MG/1
300 CAPSULE ORAL ONCE
Status: COMPLETED | OUTPATIENT
Start: 2018-11-02 | End: 2018-11-02

## 2018-11-02 RX ORDER — CEFAZOLIN SODIUM 1 G/50ML
1 SOLUTION INTRAVENOUS SEE ADMIN INSTRUCTIONS
Status: DISCONTINUED | OUTPATIENT
Start: 2018-11-02 | End: 2018-11-02 | Stop reason: HOSPADM

## 2018-11-02 RX ORDER — SODIUM CHLORIDE, SODIUM LACTATE, POTASSIUM CHLORIDE, CALCIUM CHLORIDE 600; 310; 30; 20 MG/100ML; MG/100ML; MG/100ML; MG/100ML
INJECTION, SOLUTION INTRAVENOUS CONTINUOUS
Status: DISCONTINUED | OUTPATIENT
Start: 2018-11-02 | End: 2018-11-02 | Stop reason: HOSPADM

## 2018-11-02 RX ORDER — FENTANYL CITRATE 50 UG/ML
INJECTION, SOLUTION INTRAMUSCULAR; INTRAVENOUS PRN
Status: DISCONTINUED | OUTPATIENT
Start: 2018-11-02 | End: 2018-11-02

## 2018-11-02 RX ORDER — ACETAMINOPHEN 325 MG/1
650 TABLET ORAL EVERY 4 HOURS PRN
Qty: 50 TABLET | Refills: 0 | Status: SHIPPED | OUTPATIENT
Start: 2018-11-02 | End: 2018-11-19

## 2018-11-02 RX ORDER — HYDROMORPHONE HYDROCHLORIDE 1 MG/ML
.3-.5 INJECTION, SOLUTION INTRAMUSCULAR; INTRAVENOUS; SUBCUTANEOUS EVERY 10 MIN PRN
Status: DISCONTINUED | OUTPATIENT
Start: 2018-11-02 | End: 2018-11-03 | Stop reason: HOSPADM

## 2018-11-02 RX ORDER — IBUPROFEN 200 MG
600 TABLET ORAL ONCE
Status: DISCONTINUED | OUTPATIENT
Start: 2018-11-02 | End: 2018-11-03 | Stop reason: HOSPADM

## 2018-11-02 RX ORDER — OXYCODONE HYDROCHLORIDE 5 MG/1
5 TABLET ORAL EVERY 4 HOURS PRN
Status: DISCONTINUED | OUTPATIENT
Start: 2018-11-02 | End: 2018-11-03 | Stop reason: HOSPADM

## 2018-11-02 RX ORDER — BUPIVACAINE HYDROCHLORIDE AND EPINEPHRINE 5; 5 MG/ML; UG/ML
INJECTION, SOLUTION PERINEURAL PRN
Status: DISCONTINUED | OUTPATIENT
Start: 2018-11-02 | End: 2018-11-02 | Stop reason: HOSPADM

## 2018-11-02 RX ORDER — HYDRALAZINE HYDROCHLORIDE 20 MG/ML
2.5-5 INJECTION INTRAMUSCULAR; INTRAVENOUS EVERY 10 MIN PRN
Status: DISCONTINUED | OUTPATIENT
Start: 2018-11-02 | End: 2018-11-02 | Stop reason: HOSPADM

## 2018-11-02 RX ORDER — IBUPROFEN 600 MG/1
600 TABLET, FILM COATED ORAL EVERY 6 HOURS PRN
Qty: 30 TABLET | Refills: 0 | Status: SHIPPED | OUTPATIENT
Start: 2018-11-02 | End: 2018-11-19

## 2018-11-02 RX ADMIN — CEFAZOLIN SODIUM 2 G: 2 SOLUTION INTRAVENOUS at 09:20

## 2018-11-02 RX ADMIN — GABAPENTIN 300 MG: 300 CAPSULE ORAL at 08:19

## 2018-11-02 RX ADMIN — ACETAMINOPHEN 975 MG: 325 TABLET ORAL at 08:19

## 2018-11-02 RX ADMIN — PROPOFOL 50 MG: 10 INJECTION, EMULSION INTRAVENOUS at 09:15

## 2018-11-02 RX ADMIN — BUPIVACAINE HYDROCHLORIDE AND EPINEPHRINE 2 ML: 5; 5 INJECTION, SOLUTION PERINEURAL at 09:42

## 2018-11-02 RX ADMIN — FENTANYL CITRATE 50 MCG: 50 INJECTION, SOLUTION INTRAMUSCULAR; INTRAVENOUS at 09:15

## 2018-11-02 RX ADMIN — PROPOFOL 150 MCG/KG/MIN: 10 INJECTION, EMULSION INTRAVENOUS at 09:15

## 2018-11-02 RX ADMIN — Medication 0.5 MG: at 09:45

## 2018-11-02 RX ADMIN — PROPOFOL: 10 INJECTION, EMULSION INTRAVENOUS at 10:02

## 2018-11-02 RX ADMIN — SODIUM CHLORIDE, SODIUM LACTATE, POTASSIUM CHLORIDE, CALCIUM CHLORIDE: 600; 310; 30; 20 INJECTION, SOLUTION INTRAVENOUS at 08:19

## 2018-11-02 RX ADMIN — BUPIVACAINE HYDROCHLORIDE AND EPINEPHRINE 8 ML: 5; 5 INJECTION, SOLUTION PERINEURAL at 10:05

## 2018-11-02 RX ADMIN — FENTANYL CITRATE 50 MCG: 50 INJECTION, SOLUTION INTRAMUSCULAR; INTRAVENOUS at 09:19

## 2018-11-02 RX ADMIN — OXYCODONE HYDROCHLORIDE 5 MG: 5 TABLET ORAL at 10:41

## 2018-11-02 NOTE — OP NOTE
Operative Report  Date: 11/02/18    PREOPERATIVE DIAGNOSIS:            Gender Dysphoria  POSTOPERATIVE DIAGNOSIS:          Gender Dysphoria    PROCEDURES PERFORMED:   bilateral simple scrotal orchiectomy    STAFF SURGEON:  Javid Kendall MD   RESIDENT(S):   Patricia Gasca MD  ANESTHESIA:   MAC    ESTIMATED BLOOD LOSS: 5 mL.   IV FLUIDS: see dictated anesthesia record  COMPLICATIONS: None.   SPECIMEN:    bilateral testicles     SIGNIFICANT FINDINGS:   Ligation of the cord at the level approximately at the external ring    BRIEF OPERATIVE INDICATIONS: Nay Lainez is a 27 year old transgender female wishing to undergo bilateral orchiectomy for gender affirmation surgery. Risks discussed with patient.     DESCRIPTION OF PROCEDURE: After full informed voluntary consent was obtained, the patient was transported to the operating room, placed supine on the table. After adequate anesthesia was induced, she was prepped and draped in the usual sterile fashion. A timeout was taken to confirm correct patient, procedure and laterality      We began the procedure by marking a 3 cm scrotal incision beginning at the penoscrotal junction at the midline raphe. Injection of approximately 5cc 0.5% Marcaine was used for local anesthesia along this skip. Incision was made using a scalpel and electrocautery was used to carry dissection down through the dartos muscle . The left testicle was delivered into the wound. Tunica vaginalis was intact. Blunt and electrocautery dissection was continued proximally to the level of the external ring. Cremasterics were divided. The cord was divided into two segments and separately clamped. A second safety clamp was placed below this just distal to the external ring. The cord was sharply divided and passed off for pathology.   Each cord segment was then suture ligated with 0 vicryl suture, then a 0 vicryl suture ligature was placed below the safety clamp and tied.    The procedure as stated above was  then repeated on the right side.  Irrigation was performed and a cord block was done bilaterally with 0.5% Marcaine on remnant of spermatic cord stump. Hemostasis was excellent.  The dartos was closed with a running 3-0 vicryl suture followed by the skin with running 4-0 monocryl suture in simple fashion. Dermabond was applied. Supportive dressing was applied.     Patient tolerated the procedure well. No apparent complications. She was transported to the postanesthesia care unit in stable condition.    As attending surgeon, Javid VILLANUEVA MD, was scrubbed and present for the entire procedure.

## 2018-11-02 NOTE — IP AVS SNAPSHOT
MRN:[id number]                      After Visit Summary   11/2/2018    Nay Lainez    MRN: [id number]           Thank you!     Thank you for choosing [facility] for your care. Our goal is always to provide you with excellent care. Hearing back from our patients is one way we can continue to improve our services. Please take a few minutes to complete the written survey that you may receive in the mail after you visit with us. Thank you!        Patient Information     Date Of Birth          1990        About your hospital stay     You were admitted on:  November 2, 2018 [facility]    You were discharged on:  November 2, 2018       Who to Call     For medical emergencies, please call 911.  For non-urgent questions about your medical care, please call your primary care provider or clinic, [phone]  For questions related to your surgery, please call your surgery clinic        Attending Provider     Provider Javid Rueda MD Urology       Primary Care Provider Office Phone # Fax #    Ventura Morelos [phone]      After Care Instructions     Diet Instructions       Resume pre procedure diet            Discharge Instructions       Activity  - No strenuous exercise for 3 weeks.  - No lifting, pushing, pulling more than 15 pounds for 3 week.   - Do not strain with bowel movements.  - Do not drive until you can press the brake pedal quickly and fully without pain.   - Do not operate a motor vehicle while taking narcotic pain medications.     Incisions  - You may shower and get incisions wet starting 48 hrs after surgery.  - Do not scrub incisions or submerge wounds (bath, pool, hot tub, etc) for 2 weeks.   - Your stitches will fall out on their own in 2-4 weeks  - Leave incision open to air.  Cover with gauze only if needed for comfort or to protect clothing from drainage.     Medications  - Do not take  any additional Tylenol (acetaminophen) while using Percocet or Vicodin.  - Do not take more than 4,000mg of Tylenol (acetaminophen) in any 24 hour period, as this can cause liver damage.  - Take stool softeners such as Senna while you are using narcotics, but stop if you develop diarrhea.   - Wean yourself off of narcotic pain medications.     Follow-Up:  - Follow up with Dr. Kendall as previously scheduled   - Call or return sooner than your regularly scheduled visit if you develop any of the following:  fever, uncontrolled pain, uncontrolled nausea or vomiting, as well as increased redness, swelling, or drainage from your wound.  You may call the Urology Clinic during daytime hours at [phone].  If after hours, call [phone] and ask to speak with the Urology resident on call.            Encourage fluids       Encourage fluids at home to keep urine clear to light pink                  Your next 10 appointments already scheduled     Nov 20, 2018  8:45 AM CST   (Arrive by 8:30 AM)   Post-Op with Javid Kendall MD   [facility] ([facility])    [address] [place] [id number] [phone]              Further instructions from your care team       [facility]  Home Care Following Anesthesia  For 24 hours after surgery:  1. Get plenty of rest.  A responsible adult must stay with you for at least 24 hours after you leave the surgery center.  2. Do not drive or use heavy equipment.  If you have weakness or tingling, don't drive or use heavy equipment until this feeling goes away.   3. Do not drink alcohol.   4. Avoid strenuous or risky activities.  Ask for help when climbing stairs.  5. You may feel lightheaded.  IF so, sit for a few minutes before standing.  Have someone help you get up.   6. If you have nausea (feel sick to your stomach): Drink only clear liquids such as  apple juice, ginger ale, broth or 7-Up.  Rest may also help.  Be sure to drink enough fluids.  Move to a regular diet as you feel able.   7. You may have a slight fever.  Call the doctor if your fever is over 100 F (37.7 C) (taken under the tongue) or lasts longer than 24 hours.  8. You may have a dry mouth, a sore throat, muscle aches or trouble sleeping. These should go away after 24 hours.  9. Do not make important or legal decisions.               Tips for taking pain medications  To get the best pain relief possible, remember these points:    Take pain medications as directed, before pain becomes severe.    Pain medication can upset your stomach: taking it with food may help.    Constipation is a common side effect of pain medication. Drink plenty of  fluids.    Eat foods high in fiber. Take a stool softener if recommended by your doctor or pharmacist.    Do not drink alcohol, drive or operate machinery while taking pain medications.    Ask about other ways to control pain, such as with heat, ice or relaxation.    Tylenol/Acetaminophen Consumption  To help encourage the safe use of acetaminophen, the makers of TYLENOL  have lowered the maximum daily dose for single-ingredient Extra Strength TYLENOL  (acetaminophen) products sold in the [place] from 8 pills per day (4,000 mg) to 6 pills per day (3,000 mg). The dosing interval has also changed from 2 pills every 4-6 hours to 2 pills every 6 hours.    If you feel your pain relief is insufficient, you may take Tylenol/Acetaminophen in addition to your narcotic pain medication.     Be careful not to exceed 3,000 mg of Tylenol/Acetaminophen in a 24 hour period from all sources.    If you are taking extra strength Tylenol/acetaminophen (500 mg), the maximum dose is 6 tablets in 24 hours.    If you are taking regular strength acetaminophen (325 mg), the maximum dose is 9 tablets in 24 hours.    Call a doctor for any of the followin. Signs of infection (fever, growing  tenderness at the surgery site, a large amount of drainage or bleeding, severe pain, foul-smelling drainage, redness, swelling).  2. It has been over 8 to 10 hours since surgery and you are still not able to urinate (pass water).  3. Headache for over 24 hours.  4. Numbness, tingling or weakness the day after surgery (if you had spinal anesthesia).  Your doctor is:       Dr. Javid Kendall, Prostate and Urology: [phone]               Or dial [phone] and ask for the resident on call for:  Prostate Urology  For emergency care, call the:  [facility]:  [phone] (TTY for hearing impaired: [phone])                Pending Results     Date and Time Order Name Status Description    2018 0938 Surgical pathology exam In process             Admission Information     Date & Time Provider Department Dept. Phone    2018 Javid Kendall MD [facility] [phone]      Your Vitals Were     Blood Pressure Temperature Respirations Pulse Oximetry          103/67 98.3  F (36.8  C) (Temporal) 16 95%        MyChart Information     [employer] is an electronic gateway that provides easy, online access to your medical records. With [employer], you can request a clinic appointment, read your test results, renew a prescription or communicate with your care team.     To sign up for [employer] visit the website at [email or web address]   You will be asked to enter the access code listed below, as well as some personal information. Please follow the directions to create your username and password.     Your access code is: [email or web address]  Expires: 2018  6:31 AM     Your access code will  in 90 days. If you need help or a new code, please contact your [facility] or call [phone] for assistance.        Care EveryWhere ID     This is your Care EveryWhere ID. This could be used by other organizations to access your [facility]  records  [id number]        Equal Access to Services     ELIESER CASILLAS : Hadii aad ku hadtimurmarleny Somartha, waaxda luqadaha, qaybta kasilvioheri beck, denver milnerramajuan david winkler. So [facility] [phone].    ATENCIÓN: Si habla español, tiene a knihgt disposición servicios gratuitos de asistencia lingüística. Llame al [phone].    We comply with applicable federal civil rights laws and [place] laws. We do not discriminate on the basis of race, color, national origin, age, disability, sex, sexual orientation, or gender identity.               Review of your medicines      START taking        Dose / Directions    acetaminophen 325 MG tablet   Commonly known as:  TYLENOL   Used for:  Gender dysphoria        Dose:  650 mg   Take 2 tablets (650 mg) by mouth every 4 hours as needed for mild pain   Quantity:  50 tablet   Refills:  0       HYDROcodone-acetaminophen 5-325 MG per tablet   Commonly known as:  NORCO   Used for:  Gender dysphoria        Dose:  1-2 tablet   Take 1-2 tablets by mouth every 4 hours as needed for moderate to severe pain   Quantity:  15 tablet   Refills:  0       ibuprofen 600 MG tablet   Commonly known as:  ADVIL/MOTRIN   Used for:  Gender dysphoria        Dose:  600 mg   Take 1 tablet (600 mg) by mouth every 6 hours as needed for other (mild and/or inflammatory pain)   Quantity:  30 tablet   Refills:  0       senna-docusate 8.6-50 MG per tablet   Commonly known as:  SENOKOT-S;PERICOLACE   Used for:  Gender dysphoria        Dose:  1-2 tablet   Take 1-2 tablets by mouth 2 times daily   Quantity:  30 tablet   Refills:  0         CONTINUE these medicines which have NOT CHANGED        Dose / Directions    acyclovir 400 MG tablet   Commonly known as:  ZOVIRAX        Dose:  400 mg   Take 400 mg by mouth 2 times daily   Refills:  0       chlorproMAZINE 25 MG tablet   Commonly known as:  THORAZINE        Dose:  25 mg   Take 25 mg by mouth 4 times daily   Refills:  0       diazepam 10 MG tablet    Commonly known as:  VALIUM        Dose:  10 mg   Take 10 mg by mouth 2 times daily   Refills:  0       estradiol valerate 20 MG/ML injection   Commonly known as:  DELESTROGEN        Dose:  2 mg   Inject 2 mg into the muscle every 7 days Takes on Sundays   Refills:  0       fluticasone 50 MCG/ACT spray   Commonly known as:  FLONASE        Dose:  2 spray   Spray 2 sprays into both nostrils every evening   Refills:  0       loratadine 10 MG tablet   Commonly known as:  CLARITIN        Dose:  10 mg   Take 10 mg by mouth At Bedtime   Refills:  0       PROZAC 20 MG capsule   Generic drug:  FLUoxetine        Dose:  20 mg   Take 20 mg by mouth daily   Refills:  0       TRUVADA 200-300 MG per tablet   Indication:  PrEP   Generic drug:  emtricitabine-tenofovir        Dose:  1 tablet   Take 1 tablet by mouth daily   Refills:  0            Where to get your medicines      These medications were sent to [facility] [address] [address]    Hours:  TRANSPLANT PHONE NUMBER [phone] Phone:  [phone]     acetaminophen 325 MG tablet    ibuprofen 600 MG tablet    senna-docusate 8.6-50 MG per tablet         Some of these will need a paper prescription and others can be bought over the counter. Ask your nurse if you have questions.     Bring a paper prescription for each of these medications     HYDROcodone-acetaminophen 5-325 MG per tablet                Protect others around you: Learn how to safely use, store and throw away your medicines at www.disposemymeds.org.        Information about OPIOIDS     PRESCRIPTION OPIOIDS: WHAT YOU NEED TO KNOW   We gave you an opioid (narcotic) pain medicine. It is important to manage your pain, but opioids are not always the best choice. You should first try all the other options your care team gave you. Take this medicine for as short a time (and as few doses) as possible.    Some activities  can increase your pain, such as bandage changes or therapy sessions. It may help to take your pain medicine 30 to 60 minutes before these activities. Reduce your stress by getting enough sleep, working on hobbies you enjoy and practicing relaxation or meditation. Talk to your care team about ways to manage your pain beyond prescription opioids.    These medicines have risks:    DO NOT drive when on new or higher doses of pain medicine. These medicines can affect your alertness and reaction times, and you could be arrested for driving under the influence (DUI). If you need to use opioids long-term, talk to your care team about driving.    DO NOT operate heavy machinery    DO NOT do any other dangerous activities while taking these medicines.    DO NOT drink any alcohol while taking these medicines.     If the opioid prescribed includes acetaminophen, DO NOT take with any other medicines that contain acetaminophen. Read all labels carefully. Look for the word  acetaminophen  or  Tylenol.  Ask your pharmacist if you have questions or are unsure.    You can get addicted to pain medicines, especially if you have a history of addiction (chemical, alcohol or substance dependence). Talk to your care team about ways to reduce this risk.    All opioids tend to cause constipation. Drink plenty of water and eat foods that have a lot of fiber, such as fruits, vegetables, prune juice, apple juice and high-fiber cereal. Take a laxative (Miralax, milk of magnesia, Colace, Senna) if you don t move your bowels at least every other day. Other side effects include upset stomach, sleepiness, dizziness, throwing up, tolerance (needing more of the medicine to have the same effect), physical dependence and slowed breathing.    Store your pills in a secure place, locked if possible. We will not replace any lost or stolen medicine. If you don t finish your medicine, please throw away (dispose) as directed by your pharmacist. The [place]  Pollution Control Agency has more information about safe disposal: https://www.pca.[email or web address].us/living-green/managing-unwanted-medications             Medication List: This is a list of all your medications and when to take them. Check marks below indicate your daily home schedule. Keep this list as a reference.      Medications           Morning Afternoon Evening Bedtime As Needed    acetaminophen 325 MG tablet   Commonly known as:  TYLENOL   Take 2 tablets (650 mg) by mouth every 4 hours as needed for mild pain   Last time this was given:  975 mg on 11/2/2018  8:19 AM                                acyclovir 400 MG tablet   Commonly known as:  ZOVIRAX   Take 400 mg by mouth 2 times daily                                chlorproMAZINE 25 MG tablet   Commonly known as:  THORAZINE   Take 25 mg by mouth 4 times daily                                diazepam 10 MG tablet   Commonly known as:  VALIUM   Take 10 mg by mouth 2 times daily                                estradiol valerate 20 MG/ML injection   Commonly known as:  DELESTROGEN   Inject 2 mg into the muscle every 7 days Takes on Sundays                                fluticasone 50 MCG/ACT spray   Commonly known as:  FLONASE   Spray 2 sprays into both nostrils every evening                                HYDROcodone-acetaminophen 5-325 MG per tablet   Commonly known as:  NORCO   Take 1-2 tablets by mouth every 4 hours as needed for moderate to severe pain                                ibuprofen 600 MG tablet   Commonly known as:  ADVIL/MOTRIN   Take 1 tablet (600 mg) by mouth every 6 hours as needed for other (mild and/or inflammatory pain)                                loratadine 10 MG tablet   Commonly known as:  CLARITIN   Take 10 mg by mouth At Bedtime                                PROZAC 20 MG capsule   Take 20 mg by mouth daily   Generic drug:  FLUoxetine                                senna-docusate 8.6-50 MG per tablet   Commonly known as:   SENOKOT-S;PERICOLACE   Take 1-2 tablets by mouth 2 times daily                                TRUVADA 200-300 MG per tablet   Take 1 tablet by mouth daily   Generic drug:  emtricitabine-tenofovir

## 2018-11-02 NOTE — DISCHARGE INSTRUCTIONS
[facility]  Home Care Following Anesthesia  For 24 hours after surgery:  1. Get plenty of rest.  A responsible adult must stay with you for at least 24 hours after you leave the surgery center.  2. Do not drive or use heavy equipment.  If you have weakness or tingling, don't drive or use heavy equipment until this feeling goes away.   3. Do not drink alcohol.   4. Avoid strenuous or risky activities.  Ask for help when climbing stairs.  5. You may feel lightheaded.  IF so, sit for a few minutes before standing.  Have someone help you get up.   6. If you have nausea (feel sick to your stomach): Drink only clear liquids such as apple juice, ginger ale, broth or 7-Up.  Rest may also help.  Be sure to drink enough fluids.  Move to a regular diet as you feel able.   7. You may have a slight fever.  Call the doctor if your fever is over 100 F (37.7 C) (taken under the tongue) or lasts longer than 24 hours.  8. You may have a dry mouth, a sore throat, muscle aches or trouble sleeping. These should go away after 24 hours.  9. Do not make important or legal decisions.               Tips for taking pain medications  To get the best pain relief possible, remember these points:    Take pain medications as directed, before pain becomes severe.    Pain medication can upset your stomach: taking it with food may help.    Constipation is a common side effect of pain medication. Drink plenty of  fluids.    Eat foods high in fiber. Take a stool softener if recommended by your doctor or pharmacist.    Do not drink alcohol, drive or operate machinery while taking pain medications.    Ask about other ways to control pain, such as with heat, ice or relaxation.    Tylenol/Acetaminophen Consumption  To help encourage the safe use of acetaminophen, the makers of TYLENOL  have lowered the maximum daily dose for single-ingredient Extra Strength TYLENOL  (acetaminophen) products sold in the [place] from 8  pills per day (4,000 mg) to 6 pills per day (3,000 mg). The dosing interval has also changed from 2 pills every 4-6 hours to 2 pills every 6 hours.    If you feel your pain relief is insufficient, you may take Tylenol/Acetaminophen in addition to your narcotic pain medication.     Be careful not to exceed 3,000 mg of Tylenol/Acetaminophen in a 24 hour period from all sources.    If you are taking extra strength Tylenol/acetaminophen (500 mg), the maximum dose is 6 tablets in 24 hours.    If you are taking regular strength acetaminophen (325 mg), the maximum dose is 9 tablets in 24 hours.    Call a doctor for any of the followin. Signs of infection (fever, growing tenderness at the surgery site, a large amount of drainage or bleeding, severe pain, foul-smelling drainage, redness, swelling).  2. It has been over 8 to 10 hours since surgery and you are still not able to urinate (pass water).  3. Headache for over 24 hours.  4. Numbness, tingling or weakness the day after surgery (if you had spinal anesthesia).  Your doctor is:       Dr. Javid Kendall, Prostate and Urology: [phone]               Or dial [phone] and ask for the resident on call for:  Prostate Urology  For emergency care, call the:  [place] [facility]:  [phone] (TTY for hearing impaired: [phone])

## 2018-11-02 NOTE — ANESTHESIA CARE TRANSFER NOTE
Patient: Nay Lainez    Procedure(s):  Bilateral Orchiectomy    Diagnosis: Gender Dysphoria  Diagnosis Additional Information: No value filed.    Anesthesia Type:   No value filed.     Note:  Airway :Room Air  Patient transferred to:Phase II  Handoff Report: Identifed the Patient, Identified the Reponsible Provider, Reviewed the pertinent medical history, Discussed the surgical course, Reviewed Intra-OP anesthesia mangement and issues during anesthesia, Set expectations for post-procedure period and Allowed opportunity for questions and acknowledgement of understanding      Vitals: (Last set prior to Anesthesia Care Transfer)    CRNA VITALS  11/2/2018 0956 - 11/2/2018 1030      11/2/2018             Resp Rate (set): 10                Electronically Signed By: JUAN Evans CRNA  November 2, 2018  10:30 AM

## 2018-11-02 NOTE — IP AVS SNAPSHOT
[facility]    [address] [place] [address]    Phone:  [phone]    Fax:  [phone]                                       After Visit Summary   11/2/2018    Nay Lainez    MRN: [id number]           After Visit Summary Signature Page     I have received my discharge instructions, and my questions have been answered. I have discussed any challenges I see with this plan with the nurse or doctor.    ..........................................................................................................................................  Patient/Patient Representative Signature      ..........................................................................................................................................  Patient Representative Print Name and Relationship to Patient    ..................................................               ................................................  Date                                   Time    ..........................................................................................................................................  Reviewed by Signature/Title    ...................................................              ..............................................  Date                                               Time          22EPIC Rev 08/18

## 2018-11-02 NOTE — ANESTHESIA POSTPROCEDURE EVALUATION
Anesthesia POST Procedure Evaluation    Patient: Nay Lainez   MRN:     [id number] Gender:   male   Age:    27 year old :      1990        Preoperative Diagnosis: Gender Dysphoria   Procedure(s):  Bilateral Orchiectomy   Postop Comments: No value filed.       Anesthesia Type:  General    Reportable Event: NO     PAIN: Uncomplicated   Sign Out status: Comfortable, Well controlled pain     PONV: No PONV   Sign Out status:  No Nausea or Vomiting     Neuro/Psych: Uneventful perioperative course   Sign Out Status: Preoperative baseline; Age appropriate mentation     Airway/Resp.: Uneventful perioperative course   Sign Out Status: Non labored breathing, age appropriate RR; Resp. Status within EXPECTED Parameters     CV: Uneventful perioperative course   Sign Out status: Appropriate BP and perfusion indices; Appropriate HR/Rhythm     Disposition:   Sign Out in:  PACU  Disposition:  Phase II; Home  Recovery Course: Uneventful  Follow-Up: Not required           Last Anesthesia Record Vitals:  CRNA VITALS  2018 0956 - 2018 1056      2018             Resp Rate (set): 10          Last PACU/Preop Vitals:  Vitals:    18 0803 18 1029 18 1101   BP: 106/72 103/67 99/68   Resp: 16 16 16   Temp: 36.7  C (98.1  F) 36.8  C (98.3  F) 37  C (98.6  F)   SpO2: 100% 95% 96%         Electronically Signed By: Rogerio Garcia MD, 2018, 2:05 PM

## 2018-11-06 LAB — COPATH REPORT: NORMAL

## 2018-11-07 ENCOUNTER — HOSPITAL ENCOUNTER (INPATIENT)
Facility: CLINIC | Age: 28
LOS: 2 days | Discharge: HOME | DRG: 885 | End: 2018-11-09
Attending: EMERGENCY MEDICINE | Admitting: PSYCHIATRY & NEUROLOGY
Payer: COMMERCIAL

## 2018-11-07 DIAGNOSIS — F25.0 SCHIZOAFFECTIVE DISORDER, BIPOLAR TYPE (H): ICD-10-CM

## 2018-11-07 DIAGNOSIS — R44.0 AUDITORY HALLUCINATIONS: ICD-10-CM

## 2018-11-07 DIAGNOSIS — T65.94XA INGESTION OF SUBSTANCE, UNDETERMINED INTENT, INITIAL ENCOUNTER: ICD-10-CM

## 2018-11-07 PROBLEM — R45.851 SUICIDAL IDEATIONS: Status: ACTIVE | Noted: 2018-11-07

## 2018-11-07 LAB
ALBUMIN SERPL-MCNC: 4 G/DL (ref 3.4–5)
ALCOHOL BREATH TEST: 0.05 (ref 0–0.01)
ALP SERPL-CCNC: 73 U/L (ref 40–150)
ALT SERPL W P-5'-P-CCNC: 16 U/L (ref 0–70)
AMPHETAMINES UR QL SCN: NEGATIVE
ANION GAP SERPL CALCULATED.3IONS-SCNC: 6 MMOL/L (ref 3–14)
APAP SERPL-MCNC: 4 MG/L (ref 10–20)
AST SERPL W P-5'-P-CCNC: 16 U/L (ref 0–45)
BARBITURATES UR QL: NEGATIVE
BASOPHILS # BLD AUTO: 0 10E9/L (ref 0–0.2)
BASOPHILS NFR BLD AUTO: 0.4 %
BENZODIAZ UR QL: POSITIVE
BILIRUB SERPL-MCNC: 0.4 MG/DL (ref 0.2–1.3)
BUN SERPL-MCNC: 8 MG/DL (ref 7–30)
CALCIUM SERPL-MCNC: 8.5 MG/DL (ref 8.5–10.1)
CANNABINOIDS UR QL SCN: NEGATIVE
CHLORIDE SERPL-SCNC: 104 MMOL/L (ref 94–109)
CO2 SERPL-SCNC: 26 MMOL/L (ref 20–32)
COCAINE UR QL: NEGATIVE
CREAT SERPL-MCNC: 0.72 MG/DL (ref 0.66–1.25)
DIFFERENTIAL METHOD BLD: ABNORMAL
EOSINOPHIL # BLD AUTO: 0.1 10E9/L (ref 0–0.7)
EOSINOPHIL NFR BLD AUTO: 1.2 %
ERYTHROCYTE [DISTWIDTH] IN BLOOD BY AUTOMATED COUNT: 11.9 % (ref 10–15)
ETHANOL UR QL SCN: POSITIVE
GFR SERPL CREATININE-BSD FRML MDRD: >90 ML/MIN/1.7M2
GLUCOSE SERPL-MCNC: 77 MG/DL (ref 70–99)
HCT VFR BLD AUTO: 38.6 % (ref 40–53)
HGB BLD-MCNC: 13.7 G/DL (ref 13.3–17.7)
IMM GRANULOCYTES # BLD: 0 10E9/L (ref 0–0.4)
IMM GRANULOCYTES NFR BLD: 0.1 %
INTERPRETATION ECG - MUSE: NORMAL
LYMPHOCYTES # BLD AUTO: 1.4 10E9/L (ref 0.8–5.3)
LYMPHOCYTES NFR BLD AUTO: 18.5 %
MCH RBC QN AUTO: 34.5 PG (ref 26.5–33)
MCHC RBC AUTO-ENTMCNC: 35.5 G/DL (ref 31.5–36.5)
MCV RBC AUTO: 97 FL (ref 78–100)
MONOCYTES # BLD AUTO: 0.3 10E9/L (ref 0–1.3)
MONOCYTES NFR BLD AUTO: 4.6 %
NEUTROPHILS # BLD AUTO: 5.6 10E9/L (ref 1.6–8.3)
NEUTROPHILS NFR BLD AUTO: 75.2 %
NRBC # BLD AUTO: 0 10*3/UL
NRBC BLD AUTO-RTO: 0 /100
OPIATES UR QL SCN: NEGATIVE
PLATELET # BLD AUTO: 169 10E9/L (ref 150–450)
POTASSIUM SERPL-SCNC: 3.8 MMOL/L (ref 3.4–5.3)
PROT SERPL-MCNC: 7.6 G/DL (ref 6.8–8.8)
RBC # BLD AUTO: 3.97 10E12/L (ref 4.4–5.9)
SALICYLATES SERPL-MCNC: 6 MG/DL
SODIUM SERPL-SCNC: 136 MMOL/L (ref 133–144)
WBC # BLD AUTO: 7.5 10E9/L (ref 4–11)

## 2018-11-07 PROCEDURE — 99285 EMERGENCY DEPT VISIT HI MDM: CPT | Mod: 25 | Performed by: EMERGENCY MEDICINE

## 2018-11-07 PROCEDURE — 80307 DRUG TEST PRSMV CHEM ANLYZR: CPT | Performed by: EMERGENCY MEDICINE

## 2018-11-07 PROCEDURE — 80053 COMPREHEN METABOLIC PANEL: CPT | Performed by: EMERGENCY MEDICINE

## 2018-11-07 PROCEDURE — 80329 ANALGESICS NON-OPIOID 1 OR 2: CPT | Performed by: EMERGENCY MEDICINE

## 2018-11-07 PROCEDURE — 80320 DRUG SCREEN QUANTALCOHOLS: CPT | Performed by: EMERGENCY MEDICINE

## 2018-11-07 PROCEDURE — 12400007 ZZH R&B MH INTERMEDIATE UMMC

## 2018-11-07 PROCEDURE — 85025 COMPLETE CBC W/AUTO DIFF WBC: CPT | Performed by: EMERGENCY MEDICINE

## 2018-11-07 PROCEDURE — 99285 EMERGENCY DEPT VISIT HI MDM: CPT | Mod: Z6 | Performed by: EMERGENCY MEDICINE

## 2018-11-07 PROCEDURE — 25000132 ZZH RX MED GY IP 250 OP 250 PS 637: Performed by: NURSE PRACTITIONER

## 2018-11-07 RX ORDER — HYDROXYZINE HYDROCHLORIDE 25 MG/1
25 TABLET, FILM COATED ORAL EVERY 4 HOURS PRN
Status: DISCONTINUED | OUTPATIENT
Start: 2018-11-07 | End: 2018-11-08

## 2018-11-07 RX ORDER — FOLIC ACID 1 MG/1
1 TABLET ORAL DAILY
Status: DISCONTINUED | OUTPATIENT
Start: 2018-11-07 | End: 2018-11-09 | Stop reason: HOSPADM

## 2018-11-07 RX ORDER — BISACODYL 10 MG
10 SUPPOSITORY, RECTAL RECTAL DAILY PRN
Status: DISCONTINUED | OUTPATIENT
Start: 2018-11-07 | End: 2018-11-09 | Stop reason: HOSPADM

## 2018-11-07 RX ORDER — EMTRICITABINE AND TENOFOVIR DISOPROXIL FUMARATE 200; 300 MG/1; MG/1
1 TABLET, FILM COATED ORAL DAILY
Status: DISCONTINUED | OUTPATIENT
Start: 2018-11-08 | End: 2018-11-09 | Stop reason: HOSPADM

## 2018-11-07 RX ORDER — TRAZODONE HYDROCHLORIDE 50 MG/1
50 TABLET, FILM COATED ORAL
Status: DISCONTINUED | OUTPATIENT
Start: 2018-11-07 | End: 2018-11-09 | Stop reason: HOSPADM

## 2018-11-07 RX ORDER — OLANZAPINE 5 MG/1
5-10 TABLET ORAL
Status: DISCONTINUED | OUTPATIENT
Start: 2018-11-07 | End: 2018-11-09 | Stop reason: HOSPADM

## 2018-11-07 RX ORDER — ACETAMINOPHEN 325 MG/1
650 TABLET ORAL EVERY 4 HOURS PRN
Status: DISCONTINUED | OUTPATIENT
Start: 2018-11-07 | End: 2018-11-09 | Stop reason: HOSPADM

## 2018-11-07 RX ORDER — CHLORPROMAZINE HYDROCHLORIDE 100 MG/1
100 TABLET, FILM COATED ORAL 4 TIMES DAILY
Status: DISCONTINUED | OUTPATIENT
Start: 2018-11-07 | End: 2018-11-08

## 2018-11-07 RX ORDER — DIAZEPAM 5 MG
5-20 TABLET ORAL EVERY 30 MIN PRN
Status: DISCONTINUED | OUTPATIENT
Start: 2018-11-07 | End: 2018-11-08

## 2018-11-07 RX ORDER — POLYETHYLENE GLYCOL 3350 17 G
2-4 POWDER IN PACKET (EA) ORAL
Status: DISCONTINUED | OUTPATIENT
Start: 2018-11-07 | End: 2018-11-09 | Stop reason: HOSPADM

## 2018-11-07 RX ORDER — LANOLIN ALCOHOL/MO/W.PET/CERES
100 CREAM (GRAM) TOPICAL DAILY
Status: COMPLETED | OUTPATIENT
Start: 2018-11-07 | End: 2018-11-09

## 2018-11-07 RX ORDER — MULTIPLE VITAMINS W/ MINERALS TAB 9MG-400MCG
1 TAB ORAL DAILY
Status: DISCONTINUED | OUTPATIENT
Start: 2018-11-07 | End: 2018-11-09 | Stop reason: HOSPADM

## 2018-11-07 RX ORDER — OLANZAPINE 10 MG/2ML
5-10 INJECTION, POWDER, FOR SOLUTION INTRAMUSCULAR
Status: DISCONTINUED | OUTPATIENT
Start: 2018-11-07 | End: 2018-11-09 | Stop reason: HOSPADM

## 2018-11-07 RX ORDER — NICOTINE 21 MG/24HR
1 PATCH, TRANSDERMAL 24 HOURS TRANSDERMAL DAILY
Status: DISCONTINUED | OUTPATIENT
Start: 2018-11-07 | End: 2018-11-09 | Stop reason: HOSPADM

## 2018-11-07 RX ORDER — DIAZEPAM 10 MG
10 TABLET ORAL 4 TIMES DAILY
Status: ON HOLD | COMMUNITY
End: 2018-11-26

## 2018-11-07 RX ORDER — CHLORPROMAZINE HYDROCHLORIDE 100 MG/1
100 TABLET, FILM COATED ORAL 4 TIMES DAILY
Status: ON HOLD | COMMUNITY
End: 2018-11-09

## 2018-11-07 RX ORDER — IBUPROFEN 600 MG/1
600 TABLET, FILM COATED ORAL EVERY 6 HOURS PRN
Status: DISCONTINUED | OUTPATIENT
Start: 2018-11-07 | End: 2018-11-09 | Stop reason: HOSPADM

## 2018-11-07 RX ORDER — ATENOLOL 50 MG/1
50 TABLET ORAL DAILY PRN
Status: DISCONTINUED | OUTPATIENT
Start: 2018-11-07 | End: 2018-11-09 | Stop reason: HOSPADM

## 2018-11-07 RX ORDER — ESTRADIOL VALERATE 20 MG/ML
20 INJECTION INTRAMUSCULAR
Status: DISCONTINUED | OUTPATIENT
Start: 2018-11-07 | End: 2018-11-08

## 2018-11-07 RX ORDER — ALUMINA, MAGNESIA, AND SIMETHICONE 2400; 2400; 240 MG/30ML; MG/30ML; MG/30ML
30 SUSPENSION ORAL EVERY 4 HOURS PRN
Status: DISCONTINUED | OUTPATIENT
Start: 2018-11-07 | End: 2018-11-09 | Stop reason: HOSPADM

## 2018-11-07 RX ADMIN — MULTIPLE VITAMINS W/ MINERALS TAB 1 TABLET: TAB at 19:23

## 2018-11-07 RX ADMIN — CHLORPROMAZINE HYDROCHLORIDE 100 MG: 25 TABLET, SUGAR COATED ORAL at 19:23

## 2018-11-07 RX ADMIN — THIAMINE HCL TAB 100 MG 100 MG: 100 TAB at 19:23

## 2018-11-07 RX ADMIN — FOLIC ACID 1 MG: 1 TABLET ORAL at 19:23

## 2018-11-07 RX ADMIN — NICOTINE POLACRILEX 4 MG: 2 LOZENGE ORAL at 19:26

## 2018-11-07 ASSESSMENT — ACTIVITIES OF DAILY LIVING (ADL)
LAUNDRY: WITH SUPERVISION
GROOMING: INDEPENDENT
DRESS: INDEPENDENT
ORAL_HYGIENE: INDEPENDENT

## 2018-11-07 ASSESSMENT — ENCOUNTER SYMPTOMS
FEVER: 0
HALLUCINATIONS: 1
ABDOMINAL PAIN: 0
SLEEP DISTURBANCE: 1
VOMITING: 0
NAUSEA: 0
SHORTNESS OF BREATH: 0

## 2018-11-07 NOTE — ED NOTES
Bed: ED12  Expected date:   Expected time:   Means of arrival:   Comments:  [facility] 27[facility] female. Psych eval

## 2018-11-07 NOTE — PROGRESS NOTES
Patient belongings placed in locker:  1   1 cell phone with black case  1 black bathrobe with belt   1 pair of black shoes  1 pair of socks  2 lighters  1 [place] license   1 set of keys w/empty metal cylindrical case         A               Admission:  I am responsible for any personal items that are not sent to the safe or pharmacy.  [facility] is not responsible for loss, theft or damage of any property in my possession.    Signature:  _________________________________ Date: _______  Time: _____                                              Staff Signature:  ____________________________ Date: ________  Time: _____      2nd Staff person, if patient is unable/unwilling to sign:    Signature: ________________________________ Date: ________  Time: _____     Discharge:  [facility] has returned all of my personal belongings:    Signature: _________________________________ Date: ________  Time: _____                                          Staff Signature:  ____________________________ Date: ________  Time: _____

## 2018-11-07 NOTE — IP AVS SNAPSHOT
[facility]    [facility] [address]    [address] [facility] [address]    Phone:  [phone]                                       After Visit Summary   11/7/2018    Nay Lainez    MRN: [id number]           After Visit Summary Signature Page     I have received my discharge instructions, and my questions have been answered. I have discussed any challenges I see with this plan with the nurse or doctor.    ..........................................................................................................................................  Patient/Patient Representative Signature      ..........................................................................................................................................  Patient Representative Print Name and Relationship to Patient    ..................................................               ................................................  Date                                   Time    ..........................................................................................................................................  Reviewed by Signature/Title    ...................................................              ..............................................  Date                                               Time          22EPIC Rev 08/18

## 2018-11-07 NOTE — IP AVS SNAPSHOT
MRN:[id number]                      After Visit Summary   11/7/2018    Nay Lainez    MRN: [id number]           Patient Information     Date Of Birth          1990        Designated Caregiver       Most Recent Value    Caregiver    Will someone help with your care after discharge? yes    Name of designated caregiver Luis Antonio     Phone number of caregiver [phone]    Caregiver address [address] [place] [address]      About your hospital stay     You were admitted on:  November 7, 2018 You last received care in the:  [facility]    You were discharged on:  November 9, 2018       Who to Call     For medical emergencies, please call 911.  For non-urgent questions about your medical care, please call your primary care provider or clinic, [phone]          Attending Provider     Provider Specialty    Apoorva Campo MD Emergency Medicine    [facility]       Primary Care Provider Office Phone # Fax #    Ventura Morelos [phone]      Your next 10 appointments already scheduled     Nov 20, 2018  8:45 AM CST   (Arrive by 8:30 AM)   Post-Op with Javid Kendall MD   [facility] ([facility])    [address] [place] [address] [phone]              Additional Services     Medication Therapy Management Referral       MTM referral reason            antipsychotic medications: 2 or more prescribed     This service is designed to help you get the most from your medications.  A specially trained pharmacist will work closely with you and your doctors  to solve any problems related to your medications and to help you get the   best results from taking them.      The Medication Therapy Management staff will call you to schedule an appointment.                  Further instructions from your care team         Behavioral Discharge Planning and Instructions      Summary:  You were admitted on 11/7/2018 due to auditory hallucinations and insomnia.  You were treated by Debra Naegele, APRN, CNS and discharged on 11/9/2018 from [facility] to home to follow up with your outpatient providers. You have been given referrals for DBT.    Principal Diagnosis:   1.  Schizoaffective disorder, bipolar type  2.  Cannabis use disorder, in remission  3.  Borderline personality disorder    Health Care Follow-up Appointments:   Leatha Trujillo RN, CNS -  Friday, January 4, 2019 at 820am (You have been placed on her cancel list)  [facility] [address] [facility] [address] [place] [address]   Phone: [phone] Fax: [phone]    Psychiatry Intake - Thursday at 8:00am  Medication Management and Individual Therapy  [facility] - 2 and 3rd Floors, [facility] [address]   [address] [facility] [facility] [address] Phone: [phone] fax: [phone]  You must arrive by 8:00am.      Dialectical Behavioral Therapy (DBT) Referral Services:    [facility]  [address]  [place] [address]  [phone]    [facility]  [address] [facility] [address]  [place] [address]  [phone]    [facility] (coed groups)  [address] [facility]  [phone]  [phone]    [facility] (womenonly  groups)  [address] [place] [address]  [phone] (Intake)    [facility] (women only groups)  [facility]  [address]  [place] [address]  [phone]    [facility] (women only groups)  [address] [facility] [address] [facility] [address]  [place] [address]  [phone]    [facility]  [address] [facility] [address]  [place] [address]  [phone]    [facility]  [address] [facility]  [place] [address]  [phone]    [facility]  "[facility]  [address] [place] [address]  [phone]    Attend all scheduled appointments with your outpatient providers. Call at least 24 hours in advance if you need to reschedule an appointment to ensure continued access to your outpatient providers.   Major Treatments, Procedures and Findings:  You were provided with: a psychiatric assessment, assessed for medical stability, medication evaluation and/or management, group therapy and milieu management    Symptoms to Report: Feeling more aggressive, increased confusion, losing more sleep, mood getting worse or thoughts of suicide    Early warning signs can include: Increased depression or anxiety sleep disturbances increased thoughts or behaviors of suicide or self-harm  increased unusual thinking, such as paranoia or hearing voices    Safety and Wellness:  Take all medicines as directed.  Make no changes unless your doctor suggests them. Follow treatment recommendations.  Refrain from alcohol and non-prescribed drugs.  Ask your support system to help you reduce your access to items that could harm yourself or others. If there is a concern for safety, call 911.    Resources:   Crisis Intervention: 505.604.8115 or 980-312-7081 (TTY: 359.820.1111).  Call anytime for help.  National [place] on Mental Illness (www.[place].monse.org): 902.204.9013 or 613-360-2607.  Suicide Awareness Voices of Education (SAVE) (www.save.org): 262-278-RWBP (2312)  National Suicide Prevention Line (www.mentalhealthmn.org): 732-092-COEC (6887)  Mental Health Consumer/Survivor Network of [place] (www.mhcsn.net): 685.311.6920 or 835-186-7001  Mental Health Association of [place] (www.mentalhealth.org): 578.110.2190 or 031-509-2516  [facility] Crisis (COPE) Response - Adult 252 517-9337  Crisis text line: Text \"[place]\" to 909633. Free, confidential, 24/7.  Crisis Intervention: 898.723.4073 or 834-766-8624. Call anytime for help.   [facility] Crisis Team - Child: " "[phone]    The treatment team has appreciated the opportunity to work with you.     If you have any questions or concerns our unit number is [phone]  You may be receiving a follow-up phone call within the next three days from a representative from behavioral health.    _        Pending Results     Date and Time Order Name Status Description    2018 0900 EKG 12-lead, complete Preliminary             Admission Information     Date & Time Provider Department Dept. Phone    2018 Sachin Hernandez MD [facility] [phone]      Your Vitals Were     Blood Pressure Pulse Temperature Respirations Height Weight    105/60 94 98.5  F (36.9  C) (Tympanic) 16 1.803 m (5' 11\") 61.4 kg (135 lb 6.4 oz)    Pulse Oximetry BMI (Body Mass Index)                95% 18.88 kg/m2          [employer] Information     [employer] lets you send messages to your doctor, view your test results, renew your prescriptions, schedule appointments and more. To sign up, go to [email or web address] . Click on \"Log in\" on the left side of the screen, which will take you to the Welcome page. Then click on \"Sign up Now\" on the right side of the page.     You will be asked to enter the access code listed below, as well as some personal information. Please follow the directions to create your username and password.     Your access code is: [email or web address]  Expires: 2018  5:31 AM     Your access code will  in 90 days. If you need help or a new code, please call your [place] clinic or [phone].        Care EveryWhere ID     This is your Care EveryWhere ID. This could be used by other organizations to access your [place] medical records  [id number]        Equal Access to Services     ELIESER CASILLAS : Kristi Stewart, doroteo mccabe, shahzad kakelby beck, denver winkler. So [facility] [phone].    ATENCIÓN: Si habla español, tiene a knight disposición servicios " terri de asistencia lingüística. Cody adams [phone].    We comply with applicable federal civil rights laws and [place] laws. We do not discriminate on the basis of race, color, national origin, age, disability, sex, sexual orientation, or gender identity.               Review of your medicines      START taking        Dose / Directions    OLANZapine 10 MG tablet   Commonly known as:  zyPREXA   Used for:  Schizoaffective disorder, bipolar type (H)        Dose:  10 mg   Take 1 tablet (10 mg) by mouth At Bedtime   Quantity:  30 tablet   Refills:  0         CONTINUE these medicines which have NOT CHANGED        Dose / Directions    acetaminophen 325 MG tablet   Commonly known as:  TYLENOL   Used for:  Gender dysphoria        Dose:  650 mg   Take 2 tablets (650 mg) by mouth every 4 hours as needed for mild pain   Quantity:  50 tablet   Refills:  0       diazepam 10 MG tablet   Commonly known as:  VALIUM        Dose:  10 mg   Take 10 mg by mouth every 6 hours as needed for anxiety   Refills:  0       estradiol valerate 20 MG/ML injection   Commonly known as:  DELESTROGEN        Dose:  4 mg   Inject 4 mg into the muscle every 7 days Takes on Sundays; INJECT 0.2 ML IM ONCE A WEEK   Refills:  0       ibuprofen 600 MG tablet   Commonly known as:  ADVIL/MOTRIN   Used for:  Gender dysphoria        Dose:  600 mg   Take 1 tablet (600 mg) by mouth every 6 hours as needed for other (mild and/or inflammatory pain)   Quantity:  30 tablet   Refills:  0       PROZAC 20 MG capsule   Generic drug:  FLUoxetine        Dose:  20 mg   Take 20 mg by mouth daily   Refills:  0       TRUVADA 200-300 MG per tablet   Indication:  PrEP   Generic drug:  emtricitabine-tenofovir        Dose:  1 tablet   Take 1 tablet by mouth daily   Refills:  0         STOP taking     chlorproMAZINE 100 MG tablet   Commonly known as:  THORAZINE                Where to get your medicines      These medications were sent to [facility]  [place], [place] - [address]  [address], [facility] [address]     Phone:  [phone]     OLANZapine 10 MG tablet                Protect others around you: Learn how to safely use, store and throw away your medicines at www.disposemymeds.org.             Medication List: This is a list of all your medications and when to take them. Check marks below indicate your daily home schedule. Keep this list as a reference.      Medications           Morning Afternoon Evening Bedtime As Needed    acetaminophen 325 MG tablet   Commonly known as:  TYLENOL   Take 2 tablets (650 mg) by mouth every 4 hours as needed for mild pain   Last time this was given:  650 mg on 11/8/2018 10:49 AM                                diazepam 10 MG tablet   Commonly known as:  VALIUM   Take 10 mg by mouth every 6 hours as needed for anxiety   Last time this was given:  10 mg on 11/9/2018 11:58 AM                                estradiol valerate 20 MG/ML injection   Commonly known as:  DELESTROGEN   Inject 4 mg into the muscle every 7 days Takes on Sundays; INJECT 0.2 ML IM ONCE A WEEK                                ibuprofen 600 MG tablet   Commonly known as:  ADVIL/MOTRIN   Take 1 tablet (600 mg) by mouth every 6 hours as needed for other (mild and/or inflammatory pain)   Last time this was given:  600 mg on 11/8/2018  9:22 AM                                OLANZapine 10 MG tablet   Commonly known as:  zyPREXA   Take 1 tablet (10 mg) by mouth At Bedtime   Last time this was given:  10 mg on 11/8/2018 10:31 PM                                PROZAC 20 MG capsule   Take 20 mg by mouth daily   Last time this was given:  20 mg on 11/9/2018  8:16 AM   Generic drug:  FLUoxetine                                TRUVADA 200-300 MG per tablet   Take 1 tablet by mouth daily   Last time this was given:  1 tablet on 11/9/2018  8:16 AM   Generic drug:  emtricitabine-tenofovir

## 2018-11-07 NOTE — PHARMACY-ADMISSION MEDICATION HISTORY
Admission medication history for the November 7, 2018 admission is complete.     Interview sources:  Patient interview, [facility] fill history    Reliability of source: Moderate - Patient was very sedated during interview but mentioned all of the medications she took and stated she took them right before she was admitted.    Medication compliance: Unclear.  Appears to be good up until this admission    Preferred Outpatient Pharmacy: [facility] [phone]    Changes made to PTA medication list (reason)  Added: none  Deleted:   Acyclovir 400 mg - Take by mouth twice daily (patient denied taking)  fluticasone 50 mcg - Spray into both nostrils every evening (patient denied taking)  loratadine 10 mg - take by mouth at bedtime (patient denied taking - post op med)  Senna-docusate 8.6/50 mg - Take 1-2 tablets by mouth twice daily (patient denies taking - post op med)    Changed:   Chlorpromazine 25 mg ---> 100 mg four times daily (confirmed by patient and last fill history)  Diazepam 10 mg 2 times daily ---> four times daily (confirmed by patient and last fill history)    Additional medication history information:    Patient recently had Spironolactone 100 mg daily filled but stated she stopped taking it due to her surgery no longer requiring her to have an anti-androgen.  She also recently had Chantix (varencline) filled for smoking cessation but stated it made her mental health much worse.    Prior to Admission Medication List:  Prior to Admission medications    Medication Sig Last Dose Taking? Auth Provider   acetaminophen (TYLENOL) 325 MG tablet Take 2 tablets (650 mg) by mouth every 4 hours as needed for mild pain 11/6/2018 at Unknown time Yes Javid Kendall MD   chlorproMAZINE (THORAZINE) 100 MG tablet Take 100 mg by mouth 4 times daily  Yes Unknown, Entered By History   diazepam (VALIUM) 10 MG tablet Take 10 mg by mouth every 6 hours as needed for anxiety 11/7/2018 Yes Unknown, Entered By History    emtricitabine-tenofovir (TRUVADA) 200-300 MG per tablet Take 1 tablet by mouth daily  11/7/2018 at Unknown time Yes Reported, Patient   estradiol valerate (DELESTROGEN) 20 MG/ML injection Inject into the muscle every 7 days Takes on Sundays; INJECT 0.2 ML IM ONCE A WEEK Past Week at Unknown time Yes Unknown, Entered By History   FLUoxetine (PROZAC) 20 MG capsule Take 20 mg by mouth daily 11/7/2018 at Unknown time Yes Unknown, Entered By History   ibuprofen (ADVIL/MOTRIN) 600 MG tablet Take 1 tablet (600 mg) by mouth every 6 hours as needed for other (mild and/or inflammatory pain) Unknown at Unknown time  Javid Kendall MD       Time spent: 45 minutes    Medication history completed by:   Wilfredo Francis Pharm.D. [facility]  Available daily from 1-9 pm  Phone: [phone] Ascom:*[phone] Pager: [phone]

## 2018-11-07 NOTE — ED PROVIDER NOTES
"  History     Chief Complaint   Patient presents with     Suicidal     has been hearing voices that tell her to kill herself, took medication to \"quiet the voices\"     HPI  Nay Lainez is a 27 year old male who was brought in by ambulance after she called 911.  The patient reports that she took 5 tablets of Valium and 5 doses of NyQuil last night between 1 AM and 11:30 AM this morning.  Patient reports that this was not a suicide attempt.  She states that she was trying to quiet the voices in her head.  Patient reports that she has schizoaffective disorder and has not been sleeping well and could not sleep last night.  She hears voices that are telling her to kill herself.  Patient reports that she has been hearing these voices for years.  Patient called 911 because she was trying to watch a movie and was so drowsy that she could not watch the movie.  She was concerned about the amount of pills that she took in addition to the 3 shots of vodka that she had last night.  She denies any other ingestion.  Patient denies any current suicidal ideation.  She denies any illicit drug use.    I have reviewed the Medications, Allergies, Past Medical and Surgical History, and Social History in the [employer] system.    Past Medical History:   Diagnosis Date     Anxiety      Depressive disorder      Male-to-female transgender person        Past Surgical History:   Procedure Laterality Date     BREAST SURGERY      augmentation     COSMETIC SURGERY      lip and nose nurgeries     ORCHIECTOMY SCROTAL Bilateral 11/2/2018    Procedure: Bilateral Orchiectomy;  Surgeon: Javid Kendall MD;  Location:  OR       No family history on file.    Social History   Substance Use Topics     Smoking status: Current Every Day Smoker     Packs/day: 1.00     Types: Cigarettes     Smokeless tobacco: Never Used     Alcohol use No      Comment: had 4 shots of vodak today         Review of Systems   Constitutional: Negative for fever.   Respiratory: " Negative for shortness of breath.    Cardiovascular: Negative for chest pain.   Gastrointestinal: Negative for abdominal pain, nausea and vomiting.   Psychiatric/Behavioral: Positive for hallucinations and sleep disturbance.   All other systems reviewed and are negative.      Physical Exam   BP: 100/63  Pulse: 73  Temp: 97.7  F (36.5  C)  Resp: 14  SpO2: 96 %      Physical Exam    GEN:  Alert, well developed, no acute distress, shaved head, many tattoos and piercings  HEENT:  PERRL, EOMI, Mucous membranes are moist.   Cardio:  RRR, no murmur, radial pulses equal bilaterally  PULM:  Lungs clear, good air movement, no wheezes, rales   Abd:  Soft, normal bowel sounds, no focal tenderness  Back exam:  No CVA tenderness  Musculoskeletal:  normal range of motion, no lower extremity swelling or calf tenderness  Neuro:  Alert and oriented X3, Follows commands, moving all extremities spontaneously   Skin:  Warm, dry   Psych: Depressed mood and affect, denies suicidal ideation or homicidal ideation, has auditory hallucinations telling her to kill herself.  ED Course     ED Course     Procedures             EKG Interpretation:      Interpreted by Apoorva Campo  Time reviewed: 15:20  Symptoms at time of EKG: ingestion   Rhythm: normal sinus rhythm with sinus arrhythmia  Rate: normal  Axis: normal  Ectopy: none  Conduction: Possible left atrial enlargement, prolonged QT.  QTC is 502.  QRS duration is normal at 92 ms  ST Segments/ T Waves: No ST-T wave changes  Q Waves: none  Comparison to prior: No old EKG available    Clinical Impression: Sinus rhythm with sinus arrhythmia and prolonged QT      Critical Care time:  none  Labs are normal except as shown.      Labs Ordered and Resulted from Time of ED Arrival Up to the Time of Departure from the ED   CBC WITH PLATELETS DIFFERENTIAL - Abnormal; Notable for the following:        Result Value    RBC Count 3.97 (*)     Hematocrit 38.6 (*)     MCH 34.5 (*)     All other  components within normal limits   DRUG ABUSE SCREEN 6 CHEM DEP URINE ([facility]) - Abnormal; Notable for the following:     Benzodiazepine Qual Urine Positive (*)     Ethanol Qual Urine Positive (*)     All other components within normal limits   COMPREHENSIVE METABOLIC PANEL   SALICYLATE LEVEL   ACETAMINOPHEN LEVEL            Assessments & Plan (with Medical Decision Making)   Patient presents after an ingestion in response to auditory hallucinations that are command hallucinations telling her to kill herself.  She denies that the ingestion was a suicide attempt, however, she made a poor choice in response to the hallucinations putting herself af ena for harm. Although she denies suicidal ideation at this time, I am concerned that she may make poor choices again. The hallucinations continue despite compliance with her outpatient medications. She will be admitted to a mental health bed for stabilization. Her labs are unremarkable, she is medically cleared for admission to psychiatry.     I have reviewed the nursing notes.    I have reviewed the findings, diagnosis, plan and need for follow up with the patient.    New Prescriptions    No medications on file       Final diagnoses:   Ingestion of substance, undetermined intent, initial encounter   Auditory hallucinations   Schizoaffective disorder, bipolar type (H)       11/7/2018   [facility], [place], EMERGENCY DEPARTMENT     Apoorva Campo MD  11/07/18 8904

## 2018-11-07 NOTE — ED NOTES
"ED to Behavioral Floor Handoff    SITUATION  Nay Lainez is a 27 year old male who speaks English and lives in a home unknown The patient arrived in the ED by ambulance from home with a complaint of Suicidal (has been hearing voices that tell her to kill herself, took medication to \"quiet the voices\")  .The patient's current symptoms started/worsened \"weeks\" ago and during this time the symptoms have increased.   In the ED, pt was diagnosed with   Final diagnoses:   Ingestion of substance, undetermined intent, initial encounter   Auditory hallucinations   Schizoaffective disorder, bipolar type (H)        Initial vitals were: BP: 100/63  Pulse: 73  Temp: 97.7  F (36.5  C)  Resp: 14  SpO2: 96 %   --------  Is the patient diabetic? No   If yes, last blood glucose? --     If yes, was this treated in the ED? --  --------  Is the patient inebriated (ETOH) No or Impaired on other substances? Yes, drowsy  MSSA done? N/A  Last MSSA score: --    Were withdrawal symptoms treated? N/A  Does the patient have a seizure history? No. If yes, date of most recent seizure--  --------  Is the patient patient experiencing suicidal ideation? reports having auditory hallucinations tell her to kill herself, patient denies suicidal ideations    Homicidal ideation? denies current or recent homicidal ideation or behaviors.    Self-injurious behavior/urges? denies current or recent self injurious behavior or ideation.  ------  Was pt aggressive in the ED No  Was a code called No  Is the pt now cooperative? Yes  -------  Meds given in ED: Medications - No data to display   Family present during ED course? No  Family currently present? No    BACKGROUND  Does the patient have a cognitive impairment or developmental disability? No  Allergies: No Known Allergies.   Social demographics are   Social History     Social History     Marital status:      Spouse name: N/A     Number of children: N/A     Years of education: N/A     Social History " Main Topics     Smoking status: Current Every Day Smoker     Packs/day: 1.00     Types: Cigarettes     Smokeless tobacco: Never Used     Alcohol use No      Comment: had 4 shots of vodak today     Drug use: Yes     Special: Marijuana      Comment: every few days     Sexual activity: Yes     Other Topics Concern     None     Social History Narrative    Born in [place] and primarily raised by mother as father was traveling for business. No abuse or growing up completed school on time and went to some college. 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  about 3 years ago and lives with  and apartment. Never in the  no children and no access to guns. Erns income by prostitution.        ASSESSMENT  Labs results   Labs Ordered and Resulted from Time of ED Arrival Up to the Time of Departure from the ED   CBC WITH PLATELETS DIFFERENTIAL - Abnormal; Notable for the following:        Result Value    RBC Count 3.97 (*)     Hematocrit 38.6 (*)     MCH 34.5 (*)     All other components within normal limits   DRUG ABUSE SCREEN 6 CHEM DEP URINE ([facility]) - Abnormal; Notable for the following:     Benzodiazepine Qual Urine Positive (*)     Ethanol Qual Urine Positive (*)     All other components within normal limits   ALCOHOL BREATH TEST POCT - Abnormal; Notable for the following:     Alcohol Breath Test 0.05 (*)     All other components within normal limits   COMPREHENSIVE METABOLIC PANEL   SALICYLATE LEVEL   ACETAMINOPHEN LEVEL      Imaging Studies: No results found for this or any previous visit (from the past 24 hour(s)).   Most recent vital signs /67  Pulse 70  Temp 97.6  F (36.4  C) (Oral)  Resp 16  SpO2 97%   Abnormal labs/tests/findings requiring intervention:---   Pain control: pt had none  Nausea control: pt had none    RECOMMENDATION  Are any infection precautions needed (MRSA, VRE, etc.)? No If yes, what infection? --  ---  Does the patient have  mobility issues? independently. If yes, what device does the pt use? ---  ---  Is patient on 72 hour hold or commitment? No If on 72 hour hold, have hold and rights been given to patient? N/A  Are admitting orders written if after 10 p.m. ?N/A  Tasks needing to be completed:---     Andre Back-- [phone] [place] [facility]   [phone] [facility] ED

## 2018-11-08 LAB
CHOLEST SERPL-MCNC: 126 MG/DL
HDLC SERPL-MCNC: 37 MG/DL
HIV 1+2 AB+HIV1 P24 AG SERPL QL IA: NONREACTIVE
LDLC SERPL CALC-MCNC: 71 MG/DL
NONHDLC SERPL-MCNC: 89 MG/DL
TRIGL SERPL-MCNC: 90 MG/DL
TSH SERPL DL<=0.005 MIU/L-ACNC: 0.47 MU/L (ref 0.4–4)

## 2018-11-08 PROCEDURE — 36415 COLL VENOUS BLD VENIPUNCTURE: CPT | Performed by: NURSE PRACTITIONER

## 2018-11-08 PROCEDURE — 25000132 ZZH RX MED GY IP 250 OP 250 PS 637: Performed by: NURSE PRACTITIONER

## 2018-11-08 PROCEDURE — 12400007 ZZH R&B MH INTERMEDIATE UMMC

## 2018-11-08 PROCEDURE — 87389 HIV-1 AG W/HIV-1&-2 AB AG IA: CPT | Performed by: NURSE PRACTITIONER

## 2018-11-08 PROCEDURE — 25000132 ZZH RX MED GY IP 250 OP 250 PS 637: Performed by: CLINICAL NURSE SPECIALIST

## 2018-11-08 PROCEDURE — 99221 1ST HOSP IP/OBS SF/LOW 40: CPT | Performed by: PHYSICIAN ASSISTANT

## 2018-11-08 PROCEDURE — 80061 LIPID PANEL: CPT | Performed by: NURSE PRACTITIONER

## 2018-11-08 PROCEDURE — 99223 1ST HOSP IP/OBS HIGH 75: CPT | Mod: AI | Performed by: CLINICAL NURSE SPECIALIST

## 2018-11-08 PROCEDURE — 84443 ASSAY THYROID STIM HORMONE: CPT | Performed by: NURSE PRACTITIONER

## 2018-11-08 PROCEDURE — 99207 ZZC CONSULT E&M CHANGED TO INITIAL LEVEL: CPT | Performed by: PHYSICIAN ASSISTANT

## 2018-11-08 RX ORDER — OLANZAPINE 10 MG/1
10 TABLET ORAL AT BEDTIME
Status: DISCONTINUED | OUTPATIENT
Start: 2018-11-08 | End: 2018-11-09 | Stop reason: HOSPADM

## 2018-11-08 RX ORDER — HYDROXYZINE HYDROCHLORIDE 25 MG/1
25-50 TABLET, FILM COATED ORAL EVERY 4 HOURS PRN
Status: DISCONTINUED | OUTPATIENT
Start: 2018-11-08 | End: 2018-11-09 | Stop reason: HOSPADM

## 2018-11-08 RX ORDER — CHLORPROMAZINE HYDROCHLORIDE 100 MG/1
100 TABLET, FILM COATED ORAL 3 TIMES DAILY
Status: DISCONTINUED | OUTPATIENT
Start: 2018-11-08 | End: 2018-11-09

## 2018-11-08 RX ORDER — ESTRADIOL VALERATE 20 MG/ML
4 INJECTION INTRAMUSCULAR
Status: DISCONTINUED | OUTPATIENT
Start: 2018-11-11 | End: 2018-11-09 | Stop reason: HOSPADM

## 2018-11-08 RX ORDER — DIAZEPAM 5 MG
10 TABLET ORAL 4 TIMES DAILY
Status: DISCONTINUED | OUTPATIENT
Start: 2018-11-08 | End: 2018-11-09 | Stop reason: HOSPADM

## 2018-11-08 RX ORDER — ESTRADIOL VALERATE 20 MG/ML
20 INJECTION INTRAMUSCULAR
Status: DISCONTINUED | OUTPATIENT
Start: 2018-11-11 | End: 2018-11-08

## 2018-11-08 RX ADMIN — CHLORPROMAZINE HYDROCHLORIDE 100 MG: 100 TABLET, SUGAR COATED ORAL at 12:04

## 2018-11-08 RX ADMIN — IBUPROFEN 600 MG: 600 TABLET ORAL at 09:22

## 2018-11-08 RX ADMIN — ACETAMINOPHEN 650 MG: 325 TABLET, FILM COATED ORAL at 10:49

## 2018-11-08 RX ADMIN — TRAZODONE HYDROCHLORIDE 50 MG: 50 TABLET ORAL at 22:31

## 2018-11-08 RX ADMIN — NICOTINE POLACRILEX 4 MG: 2 LOZENGE ORAL at 09:04

## 2018-11-08 RX ADMIN — NICOTINE POLACRILEX 4 MG: 2 LOZENGE ORAL at 12:04

## 2018-11-08 RX ADMIN — OLANZAPINE 10 MG: 10 TABLET, FILM COATED ORAL at 22:31

## 2018-11-08 RX ADMIN — NICOTINE POLACRILEX 4 MG: 2 LOZENGE ORAL at 18:07

## 2018-11-08 RX ADMIN — NICOTINE POLACRILEX 4 MG: 2 LOZENGE ORAL at 20:47

## 2018-11-08 RX ADMIN — NICOTINE POLACRILEX 4 MG: 2 LOZENGE ORAL at 10:53

## 2018-11-08 RX ADMIN — DIAZEPAM 10 MG: 5 TABLET ORAL at 12:42

## 2018-11-08 RX ADMIN — OLANZAPINE 10 MG: 5 TABLET, FILM COATED ORAL at 10:49

## 2018-11-08 RX ADMIN — FLUOXETINE 20 MG: 20 CAPSULE ORAL at 09:03

## 2018-11-08 RX ADMIN — EMTRICITABINE AND TENOFOVIR DISOPROXIL FUMARATE 1 TABLET: 200; 300 TABLET, FILM COATED ORAL at 09:03

## 2018-11-08 RX ADMIN — DIAZEPAM 5 MG: 5 TABLET ORAL at 09:22

## 2018-11-08 RX ADMIN — CHLORPROMAZINE HYDROCHLORIDE 75 MG: 25 TABLET, SUGAR COATED ORAL at 09:04

## 2018-11-08 RX ADMIN — FOLIC ACID 1 MG: 1 TABLET ORAL at 09:03

## 2018-11-08 RX ADMIN — CHLORPROMAZINE HYDROCHLORIDE 100 MG: 100 TABLET, SUGAR COATED ORAL at 17:39

## 2018-11-08 RX ADMIN — MULTIPLE VITAMINS W/ MINERALS TAB 1 TABLET: TAB at 09:03

## 2018-11-08 RX ADMIN — DIAZEPAM 10 MG: 5 TABLET ORAL at 20:47

## 2018-11-08 RX ADMIN — THIAMINE HCL TAB 100 MG 100 MG: 100 TAB at 09:03

## 2018-11-08 ASSESSMENT — ACTIVITIES OF DAILY LIVING (ADL)
ORAL_HYGIENE: INDEPENDENT
LAUNDRY: WITH SUPERVISION
DRESS: INDEPENDENT
GROOMING: INDEPENDENT

## 2018-11-08 NOTE — H&P
"Admitted:     11/07/2018      IDENTIFYING INFORMATION:  Nay Lainez is a transgendered male to female individual presenting after ingestion of 5 tabs of 10 mg Valium, 5 doses of Nyquil, and 3 shots of vodka.  The patient states it was not a suicide attempt, but trying to get to sleep.  The patient was cleared by ED for inpatient admission to unit 4A.      CHIEF COMPLAINT:  \"I haven't slept for the last 2-3 days.  I just want to sleep.  The voices are telling me to kill myself.\"      HISTORY OF PRESENT ILLNESS:  Nay Lainez is a 27-year-old  transgendered male to female individual presenting with auditory hallucinations and post-ingestion of Valium, Nyquil, and vodka.  The patient reports that she has not slept for 2-3 days.  The patient reports that sleeping for her has been an issue.  The patient reports she has trialed lithium and Depakote and has not been able to tolerate either of these medications.  The patient has a history of schizoaffective disorder, bipolar type.  The patient's medications are managed by Dr. Khanna at [facility].  The patient states that she uses Valium for mood stabilization and anxiety.  The patient reports she has developed a tolerance and takes 10 mg doses of Valium 4 times daily.  The patient reports she has also trialled Thorazine, taking 100 mg 4 times daily and continues to have issues with sleep and command hallucinations telling her to kill herself.  The patient states she has been hearing voices for years.  When asking the patient why she took the Nyquil, vodka, and Valium because of the voices, the patient states it was due to not being able to sleep.  The patient's goal is to obtain better sleep, manage her anxiety and voices.      PSYCHIATRIC REVIEW OF SYSTEMS:  The patient denies feeling depressed.  The patient reports sleep has been poor, but that is a chronic issue.  The patient states that her appetite is \"voracious.\"  The patient states she is vegan and " "is unable to gain weight, even though she eats a lot.  The patient denies any suicidal thoughts.  The patient denies homicidal thoughts.  The patient endorses symptoms of solitario of not being able to sleep.  Her speech is not pressured.  She is not endorsing psychosis.  Thinking is organized.  The patient does have auditory hallucinations which are \"mumbling right now.\"  She denies any visual hallucinations.  She denies any feelings of paranoia.  The patient reports anxiety has always been an issue for her and has multiple panic attacks.  Patient reports that she did have symptoms of PTSD.  Most symptoms are resolved.  Once in a while, she will have a flashback.  The patient reports that she had a diagnosis of anorexia.  The patient states it is not currently active.  She is eating.  The patient denies any symptoms of OCD.  The patient denies engaging in any risky behavior.      PSYCHIATRIC HISTORY:  The patient has had multiple hospitalizations, including 2 hospitalizations at [facility] in 2017, 2 hospitalizations in 2016, 1 at [facility] and 1 at [facility].  The patient has trialed multiple medications, including Haldol.  The patient was trialed on Seroquel and Risperdal 3 mg in 2017.  She was switched to Thorazine.  The patient reports she had akathisia from Risperdal.  The patient reports she was switched from Ativan and Klonopin to Valium in 2017 by her outpatient provider, Dr. Khanna.  The patient reports trialing lithium and Depakote.  The patient states she is unable to tolerate either of these medications due to side effects.      PAST MEDICAL HISTORY:  The patient currently is taking Truvada for HIV prophylaxis.      PAST SURGICAL HISTORY:  Recent surgery on 11/02, orchiectomy, scrotal.  The patient has also had breast augmentation.      FAMILY HISTORY:  The patient is  for 4 years.  The patient reports that she has a service dog that lives with her at home.      SOCIAL HISTORY:  The patient is " unemployed.  She lives in [facility], grew up in [place], [place].      MEDICAL REVIEW OF SYSTEMS:  Reviewed documentation for review of systems completed by Dr. Apoorva Campo, dated 11/7/2018.  No changes noted.      PHYSICAL EXAMINATION:   VITAL SIGNS:  Blood pressure 100/63, pulse 73, temperature 97.7 Fahrenheit, respirations 14, SpO2 is at 96%.     Reviewed documentation for physical examination completed by Dr. Apoorva Campo, dated 11/7/2018.  No changes are noted.      MENTAL STATUS EXAMINATION:  The patient appears her stated age.  She is dressed in scrubs.  She has adequate hygiene.  The patient has multiple piercings on face and ears and multiple tattoos on head, neck, and body.  The patient was in the hallway walking and was cooperative and accompanied me to the interview room.  She was calm and cooperative throughout the interview.  Eye contact was adequate.  She did not display any psychomotor abnormalities.  Speech was spontaneous.  She used a conversational rate, rhythm, and tone.  She was not pressured.  She elaborated appropriately.  Mood is described as anxious.  Affect was blunted and not congruent.  Thought process was linear and logical.  Associations were intact.  Thought content did not display evidence of psychosis, although patient reports that she is experiencing auditory hallucinations, which includes mumbling.  The patient denies any suicidal thinking, no active plan.  She denies homicidal thinking.  Insight and judgment appear to be fair.  Cognition appears intact to interviewing, including orientation to person, place, time, situation, use of language, and fund of knowledge.  Recent and remote memory are grossly intact.  Muscle strength, tone, and gait appear within normal limits upon observation.      ASSESSMENT:   1.  Schizoaffective disorder, bipolar type.   2.  Cannabis use disorder, in remission.   3.  Borderline personality disorder.   4.  Male to female transgender.       PLAN:   1.  The patient has been admitted to behavioral unit 4A on a voluntary basis.   2.  Discussed medications with the patient.  The patient is complaining of lack of sleep, even though she is taking Thorazine 100 mg q.i.d. and Valium 10 mg q.i.d.  Discussed with patient trialing Zyprexa and tapering Thorazine.  Discussed risks, benefits, and side effects of medication with patient.   3.  Order DISCUS due to being treated with Thorazine for over a year.   4.  Internal Med consult.  The patient had surgery on  for followup and pain.   5.  Psychosocial treatments to be addressed with CTC.         DEBRA A. NAEGELE, APRN, CNS             D: 2018   T: 2018   MT: BALJEET      Name:     ROSY DIEHL   MRN:      [id number]        Account:      [id number]   :      1990        Admitted:     2018                   Document: [id number]

## 2018-11-08 NOTE — PROVIDER NOTIFICATION
"   11/08/18 0700   Vital Signs   Temp 98.1  F (36.7  C)   Temp src Tympanic   Resp 16   Pulse 82   Pulse/Heart Rate Source Monitor   /88   Sitting Orthostatic BP   Sitting Orthostatic /88   Sitting Orthostatic Pulse 82 bpm   Standing Orthostatic BP   Standing Orthostatic /95   Standing Orthostatic Pulse 118 bpm   Oxygen Therapy   SpO2 95 %   O2 Device None (Room air)   Pain/Comfort   Patient Currently in Pain denies   Height and Weight   Weight 61.4 kg (135 lb 6.4 oz)     Patient MSSA (PCS) monitored scoring 8 at 0900 am; with valium ordered for s/s associated with substance withdrawal. Pt blunt affect, rates her anxiety a 8/10; smokes greater than 1 pack/day, declines nicotine patch at this time using lozenges.  Patient denies GI discomfort, ate breakfast 80%, slight visual tremor in arms and tongue, initially denies hallucinations, then reports milieu to be overstimulating due to auditory voices, did not elaborate. Pt reports sleeping well last night and attributes it to a medication that she had received last night. She also states history of valium use, stating, \"I take 10 mg valium four times a day for years.\"    VS reviewed: /88  Pulse 82  Temp 98.1  F (36.7  C) (Tympanic)  Resp 16  Ht 1.803 m (5' 11\")  Wt 61.4 kg (135 lb 6.4 oz)  SpO2 95%  BMI 18.88 kg/m2 . Patient acknowledges vaginal pain S/P surgical procedure a week ago; pt denies any knowledge of the recommended follow-up or treatment; prn ibuprofen given this morning.    Length of stay: 1    Patient evaluation continues. Assessed mood,anxiety,thoughts and behavior and withdrawal process. Patient given reassurance of meeting with provider to discuss medication regimen; pt agrees to timely inform staff if having any difficulty coping to ensure safety; will explore current coping skills, monitor closely and invite to groups/programming again later today; Following Team, provider aware of potential withdrawal and will meet " with pt to formulate plan/ med regimen.     Refer to daily team meeting notes for individualized plan of care. Nursing will continue to monitor.     * Scale is offered as scale of 1 to 10 with 10 being the worst.

## 2018-11-08 NOTE — CONSULTS
"[facility]  Internal Medicine Consult     Nay Lainez MRN# [id number]   Age: 27 year old YOB: 1990     Date of Admission: 11/7/2018  Date of Consult:  11/8/2018    Requesting Service: Behavioral Health - Sachin Hernandez MD         Reason for Consult:   \"follow up for orchiectomy scrotal, pt reporting pain\"         Assessment and Recommendations:   Nay Lainez is a 27 year old transgender female who was admitted to [facility] station 4A due to auditory hallucinations. IM was asked to see patient in regards to post-op pain.     # Auditory hallucinations.  Management per primary team, psychiatry.     Post-op pain s/p Orchiectomy.  Surgery uncomplicated 11/2, discharged same day. 2 days norco written (per patient). No fevers or chills. No increased erythema, discharge or swelling per patient. No pain with urination. No evidence of developing infection.   - Continue tylenol/ibuprofen prn for pain  - Can also utilize ice packs  - F/u with Urology 11/20 as scheduled  - Notify IM if fever develops, or increased swelling or discharge from surgical site to suggest developing infection      IM will sign off at this time.      Nelly Mcbride PA-C  Hospitalist Service  [phone]             History of Present Illness:   Nay Lainez is a 27 year old transgender female who was admitted to [facility] station 4A due to auditory hallucinations. IM was asked to see patient in regards to post-op pain.   S/p bilateral scrotal orchiectomy 11/2 by Dr. Kendall. Surgery without complications, pt discharged same day. Has follow up scheduled for 11/20.   Currently complains of lower abdominal pain consistent with where vas deferens were tied as well as ongoing scrotal pain. Denies any fevers or chills. No discharge, increased erythema or swelling.          Review of Systems:   A 10 point review of systems was performed and is negative unless otherwise noted in HPI.          Past Medical History: "     Past Medical History:   Diagnosis Date     Anxiety      Depressive disorder      Male-to-female transgender person              Past Surgical History:      Past Surgical History:   Procedure Laterality Date     BREAST SURGERY      augmentation     COSMETIC SURGERY      lip and nose nurgeries     ORCHIECTOMY SCROTAL Bilateral 11/2/2018    Procedure: Bilateral Orchiectomy;  Surgeon: Javid Kendall MD;  Location:  OR             Family History:   Family history was reviewed.      No family history on file.          Social History:     Social History   Substance Use Topics     Smoking status: Current Every Day Smoker     Packs/day: 1.00     Types: Cigarettes     Smokeless tobacco: Never Used     Alcohol use No      Comment: had 4 shots of vodak today             Medications:     No current facility-administered medications on file prior to encounter.   Current Outpatient Prescriptions on File Prior to Encounter:  acetaminophen (TYLENOL) 325 MG tablet Take 2 tablets (650 mg) by mouth every 4 hours as needed for mild pain   emtricitabine-tenofovir (TRUVADA) 200-300 MG per tablet Take 1 tablet by mouth daily    estradiol valerate (DELESTROGEN) 20 MG/ML injection Inject 4 mg into the muscle every 7 days Takes on Sundays; INJECT 0.2 ML IM ONCE A WEEK   FLUoxetine (PROZAC) 20 MG capsule Take 20 mg by mouth daily   ibuprofen (ADVIL/MOTRIN) 600 MG tablet Take 1 tablet (600 mg) by mouth every 6 hours as needed for other (mild and/or inflammatory pain)       Current Facility-Administered Medications   Medication     acetaminophen (TYLENOL) tablet 650 mg     alum & mag hydroxide-simethicone (MYLANTA ES/MAALOX  ES) suspension 30 mL     atenolol (TENORMIN) tablet 50 mg     bisacodyl (DULCOLAX) Suppository 10 mg     chlorproMAZINE (THORAZINE) tablet 100 mg     diazepam (VALIUM) tablet 10 mg     emtricitabine-tenofovir (TRUVADA) 200-300 MG per tablet 1 tablet     [START ON 11/11/2018] estradiol valerate (DELESTROGEN) injection  "4 mg     FLUoxetine (PROzac) capsule 20 mg     folic acid (FOLVITE) tablet 1 mg     hydrOXYzine (ATARAX) tablet 25-50 mg     ibuprofen (ADVIL/MOTRIN) tablet 600 mg     magnesium hydroxide (MILK OF MAGNESIA) suspension 30 mL     multivitamin, therapeutic with minerals (THERA-VIT-M) tablet 1 tablet     nicotine (NICODERM CQ) 21 MG/24HR 24 hr patch 1 patch     nicotine Patch in Place     nicotine patch REMOVAL     nicotine polacrilex (COMMIT) lozenge 2-4 mg     OLANZapine (zyPREXA) tablet 5-10 mg    Or     OLANZapine (zyPREXA) injection 5-10 mg     OLANZapine (zyPREXA) tablet 10 mg     thiamine tablet 100 mg     traZODone (DESYREL) tablet 50 mg            Allergies:   No Known Allergies          Physical Exam:   /88  Pulse 82  Temp 98.1  F (36.7  C) (Tympanic)  Resp 16  Ht 1.803 m (5' 11\")  Wt 61.4 kg (135 lb 6.4 oz)  SpO2 95%  BMI 18.88 kg/m2   GENERAL: Alert and oriented x 3. NAD.   HEENT: Anicteric sclera. PERRL. Mucous membranes moist.   CV: RRR. S1, S2. No murmurs appreciated.   RESPIRATORY: Effort normal. Lungs CTAB with no wheezing, rales, rhonchi.   GI: Abdomen soft and non distended with normoactive bowel sounds present in all quadrants. No tenderness, rebound, guarding.   MUSCULOSKELETAL: No joint swelling or tenderness.   NEUROLOGICAL: No focal deficits. Moves all extremities.   EXTREMITIES: No peripheral edema. Intact bilateral pedal pulses.   SKIN: No jaundice. No rashes. Multiple tattoos, piercing's.          Labs:   CBC:  Recent Labs   Lab Test  11/07/18   1504   WBC  7.5   RBC  3.97*   HGB  13.7   HCT  38.6*   MCV  97   MCH  34.5*   MCHC  35.5   RDW  11.9   PLT  169       CMP:  Recent Labs   Lab Test  11/07/18   1504   NA  136   POTASSIUM  3.8   CHLORIDE  104   CARRIE  8.5   CO2  26   BUN  8   CR  0.72   GLC  77   AST  16   ALT  16   BILITOTAL  0.4   ALBUMIN  4.0   PROTTOTAL  7.6   ALKPHOS  73       TSH:  TSH   Date Value Ref Range Status   11/08/2018 0.47 0.40 - 4.00 mU/L Final "             Nelly Mcbride PA-C  Hospitalist Service  [phone]

## 2018-11-08 NOTE — PLAN OF CARE
Problem: Suicide Risk (Adult)  Goal: Identify Related Risk Factors and Signs and Symptoms  Related risk factors and signs and symptoms are identified upon initiation of [facility] [employer] Clinical Practice Guideline (CPG).   Outcome: No Change  ADMISSION:    Pt is a 27 year old male-to-female pt admitted to the unit due to voices in her head telling her to kill herself, hurt herself, and hurt others. Pt called EMS after she had ingested benadryl, valium, and vodka in an attempt to stop the voices. Pt has a history of ADHD, schizophrenia (has heard voices since age 19), depression, anxiety, borderline personality disorder, eating disorder, and gender dysphoria. Pt states she has been sleeping 3-4 hours per day for the past several months. Pt eats a vegan diet. Pt states she has been sober for about one year prior to drinking some vodka last night/early morning.

## 2018-11-08 NOTE — PLAN OF CARE
Problem: General Plan of Care (Inpatient Behavioral)  Goal: Team Discussion  Team Plan:  Behavioral Team Discussion: (11/8/2018)    Continued Stay Criteria/Rationale: Patient admitted for Schizoaffective disorder.  Plan: The following services will be provided to the patient; psychiatric assessment, medication management, therapeutic milieu, individual and group support, art therapy, and skills/OT groups.   Participants: 4A Provider: Debra Naegele, APRN, CNS; 4A RN's: Scott Garcia, RN and Lisa Torres RN; 4A CTC's: Erika Hart (CTC), Michele Lee (CTC) and Cristian Munguia OTR/L.  Summary/Recommendation: Providers will assess today for treatment recommendations, discharge planning, and aftercare plans. JUANITA Sheridan will meet with pt to complete psych-social assessment.   Medical/Physical: Deferred (see medical notes).  Precautions:   Behavioral Orders   Procedures     Code 1 - Restrict to Unit     DISCUS     Baseline- Pt on Thorazine for one year. Tapering Thorazine and adding Zyprexa.     Routine Programming     As clinically indicated     Status 15     Every 15 minutes.     Suicide precautions     Patients on Suicide Precautions should have a Combination Diet ordered that includes a Diet selection(s) AND a Behavioral Tray selection for Safe Tray - with utensils, or Safe Tray - NO utensils     Rationale for change in precautions or plan: N/A  Progress: Initial.

## 2018-11-08 NOTE — PROVIDER NOTIFICATION
Pt reporting pain/increased sensitivity or tenderness near the vaginal groin/ incisional area; pt believes it may be swollen, but states she cannot view underneath own former scrotum area to exam swelling. Pt given tylenol/ibuprofen and ice pack, but denies any relief; pt denies any alleviating or aggravating factors; pt already anxious as she is tapering from Thorazine and/or potential benzodiazepines. Pt reassures writier she is urinating and having bowel movements without difficulty; denies any fever, chills or nausea.   Primary clinician informed, Internal Med consult placed; no stat symptoms of infection, but pt is uncomfortable; will continue tylenol/ibuprofen, ice packs and other comfort techniques;  Pt still reports muffled voices and denies any content; denies any SI/SIB or HI and reports she will approach staff if having difficulty coping safely. Pt cooperative and no aggression or other behavior concerns noted.

## 2018-11-08 NOTE — PLAN OF CARE
"Problem: General Plan of Care (Inpatient Behavioral)  Goal: Individualization/Patient Specific Goal (IP Behavioral)  The patient and/or their representative will achieve their patient-specific goals related to the plan of care.    The patient-specific goals include:  Patient identified the following reasons for hospitalization:  \"I haven't slept in months\"    Patient identified the follow goals for discharge:  \"Sleep\"        "
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
## 2018-11-09 VITALS
DIASTOLIC BLOOD PRESSURE: 60 MMHG | HEIGHT: 71 IN | TEMPERATURE: 98.5 F | HEART RATE: 94 BPM | SYSTOLIC BLOOD PRESSURE: 105 MMHG | OXYGEN SATURATION: 95 % | BODY MASS INDEX: 18.96 KG/M2 | RESPIRATION RATE: 16 BRPM | WEIGHT: 135.4 LBS

## 2018-11-09 PROCEDURE — 25000132 ZZH RX MED GY IP 250 OP 250 PS 637: Performed by: NURSE PRACTITIONER

## 2018-11-09 PROCEDURE — 25000132 ZZH RX MED GY IP 250 OP 250 PS 637: Performed by: CLINICAL NURSE SPECIALIST

## 2018-11-09 PROCEDURE — 99239 HOSP IP/OBS DSCHRG MGMT >30: CPT | Performed by: CLINICAL NURSE SPECIALIST

## 2018-11-09 PROCEDURE — 93010 ELECTROCARDIOGRAM REPORT: CPT | Performed by: INTERNAL MEDICINE

## 2018-11-09 PROCEDURE — 93005 ELECTROCARDIOGRAM TRACING: CPT

## 2018-11-09 RX ORDER — OLANZAPINE 10 MG/1
10 TABLET ORAL AT BEDTIME
Qty: 30 TABLET | Refills: 0 | Status: SHIPPED | OUTPATIENT
Start: 2018-11-09 | End: 2020-10-27

## 2018-11-09 RX ORDER — CHLORPROMAZINE HYDROCHLORIDE 100 MG/1
100 TABLET, FILM COATED ORAL ONCE
Status: DISCONTINUED | OUTPATIENT
Start: 2018-11-10 | End: 2018-11-09 | Stop reason: HOSPADM

## 2018-11-09 RX ORDER — CHLORPROMAZINE HYDROCHLORIDE 100 MG/1
100 TABLET, FILM COATED ORAL 2 TIMES DAILY
Status: DISCONTINUED | OUTPATIENT
Start: 2018-11-09 | End: 2018-11-09 | Stop reason: HOSPADM

## 2018-11-09 RX ADMIN — NICOTINE POLACRILEX 4 MG: 2 LOZENGE ORAL at 11:10

## 2018-11-09 RX ADMIN — NICOTINE POLACRILEX 4 MG: 2 LOZENGE ORAL at 13:08

## 2018-11-09 RX ADMIN — NICOTINE POLACRILEX 4 MG: 2 LOZENGE ORAL at 11:58

## 2018-11-09 RX ADMIN — DIAZEPAM 10 MG: 5 TABLET ORAL at 08:16

## 2018-11-09 RX ADMIN — NICOTINE POLACRILEX 4 MG: 2 LOZENGE ORAL at 10:03

## 2018-11-09 RX ADMIN — FOLIC ACID 1 MG: 1 TABLET ORAL at 08:16

## 2018-11-09 RX ADMIN — THIAMINE HCL TAB 100 MG 100 MG: 100 TAB at 08:16

## 2018-11-09 RX ADMIN — NICOTINE POLACRILEX 4 MG: 2 LOZENGE ORAL at 09:07

## 2018-11-09 RX ADMIN — DIAZEPAM 10 MG: 5 TABLET ORAL at 11:58

## 2018-11-09 RX ADMIN — CHLORPROMAZINE HYDROCHLORIDE 100 MG: 100 TABLET, SUGAR COATED ORAL at 08:16

## 2018-11-09 RX ADMIN — FLUOXETINE 20 MG: 20 CAPSULE ORAL at 08:16

## 2018-11-09 RX ADMIN — MULTIPLE VITAMINS W/ MINERALS TAB 1 TABLET: TAB at 08:16

## 2018-11-09 RX ADMIN — EMTRICITABINE AND TENOFOVIR DISOPROXIL FUMARATE 1 TABLET: 200; 300 TABLET, FILM COATED ORAL at 08:16

## 2018-11-09 ASSESSMENT — ACTIVITIES OF DAILY LIVING (ADL): GROOMING: INDEPENDENT

## 2018-11-09 NOTE — PLAN OF CARE
"Problem: Overarching Goals (Adult)  Goal: Optimized Coping Skills in Response to Life Stressors  Outcome: Therapy, progress towards functional goals is fair  Patient up and out for medications.  Visible in the milieu.  Pleasant and cooperative.  Full range.  Reports that she got some good sleep last night.    Identifies her main concern is not sleeping the 3 nights before her admission.   Currently denies SI/SIB.  Denies voices this morning.  Denies depression, but does acknowledge \"some\" anxiety (again related to sleep concerns).  Thinking is linear and organized.    Expresses good support: , and a good friend.    "

## 2018-11-09 NOTE — DISCHARGE SUMMARY
Psychiatric Discharge Summary    Nay Lainez MRN# [id number]   Age: 27 year old YOB: 1990     Date of Admission:  11/7/2018  Date of Discharge:  11/9/2018  Admitting Physician:  Sachin Hernandez MD  Discharge Physician:  Debra A. Naegele, APRN CNS (Contact: [phone])         Event Leading to Hospitalization:   Nay Lainez is a 27-year-old  transgendered male to female individual presenting with auditory hallucinations and post-ingestion of Valium, Nyquil, and vodka.  The patient reports that she has not slept for 2-3 days.  The patient reports that sleeping for her has been an issue.  The patient reports she has trialed lithium and Depakote and has not been able to tolerate either of these medications.  The patient has a history of schizoaffective disorder, bipolar type.  The patient's medications are managed by Dr. Khanna at [facility].  The patient states that she uses Valium for mood stabilization and anxiety.  The patient reports she has developed a tolerance and takes 10 mg doses of Valium 4 times daily.  The patient reports she has also trialled Thorazine, taking 100 mg 4 times daily and continues to have issues with sleep and command hallucinations telling her to kill herself.  The patient states she has been hearing voices for years.  When asking the patient why she took the Nyquil, vodka, and Valium because of the voices, the patient states it was due to not being able to sleep.  The patient's goal is to obtain better sleep, manage her anxiety and voices.        See Admission note by Debra Naegele APRN, [facility] on 11/8/2018 for additional details.          DIagnoses:     1.  Schizoaffective disorder, bipolar type.   2.  Cannabis use disorder, in remission.   3.  Borderline personality disorder.   4.  Male to female transgender.            Labs:     Results for orders placed or performed during the hospital encounter of 11/07/18   CBC with platelets differential   Result  Value Ref Range    WBC 7.5 4.0 - 11.0 10e9/L    RBC Count 3.97 (L) 4.4 - 5.9 10e12/L    Hemoglobin 13.7 13.3 - 17.7 g/dL    Hematocrit 38.6 (L) 40.0 - 53.0 %    MCV 97 78 - 100 fl    MCH 34.5 (H) 26.5 - 33.0 pg    MCHC 35.5 31.5 - 36.5 g/dL    RDW 11.9 10.0 - 15.0 %    Platelet Count 169 150 - 450 10e9/L    Diff Method Automated Method     % Neutrophils 75.2 %    % Lymphocytes 18.5 %    % Monocytes 4.6 %    % Eosinophils 1.2 %    % Basophils 0.4 %    % Immature Granulocytes 0.1 %    Nucleated RBCs 0 0 /100    Absolute Neutrophil 5.6 1.6 - 8.3 10e9/L    Absolute Lymphocytes 1.4 0.8 - 5.3 10e9/L    Absolute Monocytes 0.3 0.0 - 1.3 10e9/L    Absolute Eosinophils 0.1 0.0 - 0.7 10e9/L    Absolute Basophils 0.0 0.0 - 0.2 10e9/L    Abs Immature Granulocytes 0.0 0 - 0.4 10e9/L    Absolute Nucleated RBC 0.0    Comprehensive metabolic panel   Result Value Ref Range    Sodium 136 133 - 144 mmol/L    Potassium 3.8 3.4 - 5.3 mmol/L    Chloride 104 94 - 109 mmol/L    Carbon Dioxide 26 20 - 32 mmol/L    Anion Gap 6 3 - 14 mmol/L    Glucose 77 70 - 99 mg/dL    Urea Nitrogen 8 7 - 30 mg/dL    Creatinine 0.72 0.66 - 1.25 mg/dL    GFR Estimate >90 >60 mL/min/1.7m2    GFR Estimate If Black >90 >60 mL/min/1.7m2    Calcium 8.5 8.5 - 10.1 mg/dL    Bilirubin Total 0.4 0.2 - 1.3 mg/dL    Albumin 4.0 3.4 - 5.0 g/dL    Protein Total 7.6 6.8 - 8.8 g/dL    Alkaline Phosphatase 73 40 - 150 U/L    ALT 16 0 - 70 U/L    AST 16 0 - 45 U/L   Salicylate level   Result Value Ref Range    Salicylate Level 6 mg/dL   Acetaminophen level   Result Value Ref Range    Acetaminophen Level 4 mg/L   Drug abuse screen 6 urine (chem dep) ([facility])   Result Value Ref Range    Amphetamine Qual Urine Negative NEG^Negative    Barbiturates Qual Urine Negative NEG^Negative    Benzodiazepine Qual Urine Positive (A) NEG^Negative    Cannabinoids Qual Urine Negative NEG^Negative    Cocaine Qual Urine Negative NEG^Negative    Ethanol Qual Urine Positive (A) NEG^Negative     "Opiates Qualitative Urine Negative NEG^Negative   TSH with free T4 reflex and/or T3 as indicated   Result Value Ref Range    TSH 0.47 0.40 - 4.00 mU/L   Lipid panel   Result Value Ref Range    Cholesterol 126 <200 mg/dL    Triglycerides 90 <150 mg/dL    HDL Cholesterol 37 (L) >39 mg/dL    LDL Cholesterol Calculated 71 <100 mg/dL    Non HDL Cholesterol 89 <130 mg/dL   HIV Antigen Antibody Combo   Result Value Ref Range    HIV Antigen Antibody Combo Nonreactive NR^Nonreactive       EKG 12-lead, tracing only   Result Value Ref Range    Interpretation ECG Click View Image link to view waveform and result    EKG 12-lead, complete   Result Value Ref Range    Interpretation ECG Click View Image link to view waveform and result    Internal Medicine Adult IP Consult for [facility] Young Adult on 4A: Patient to be seen: Routine within 24 hrs; Call back #: [phone]; follow up for orchiectomy scrotal, pt reporting pain; Consultant may enter orders: Yes    Nelly Cross PA-C     11/8/2018  1:21 PM  [facility]  Internal Medicine Consult     Nay Lainez MRN# [id number]   Age: 27 year old YOB: 1990     Date of Admission: 11/7/2018  Date of Consult:  11/8/2018    Requesting Service: Behavioral Health - Sachin Hernandez MD         Reason for Consult:   \"follow up for orchiectomy scrotal, pt reporting pain\"         Assessment and Recommendations:   Nay Lainez is a 27 year old transgender female who was   admitted to [facility] station 4A due to auditory hallucinations. IM   was asked to see patient in regards to post-op pain.     # Auditory hallucinations.  Management per primary team,   psychiatry.     Post-op pain s/p Orchiectomy.  Surgery uncomplicated 11/2,   discharged same day. 2 days norco written (per patient). No   fevers or chills. No increased erythema, discharge or swelling   per patient. No pain with urination. No evidence of developing   infection.   - Continue " tylenol/ibuprofen prn for pain  - Can also utilize ice packs  - F/u with Urology 11/20 as scheduled  - Notify IM if fever develops, or increased swelling or discharge   from surgical site to suggest developing infection      IM will sign off at this time.      Nelly Mcbride PA-C  Hospitalist Service  [phone]             History of Present Illness:   Nay Lainez is a 27 year old transgender female who was   admitted to [facility] station 4A due to auditory hallucinations. IM   was asked to see patient in regards to post-op pain.   S/p bilateral scrotal orchiectomy 11/2 by Dr. Kendall. Surgery   without complications, pt discharged same day. Has follow up   scheduled for 11/20.   Currently complains of lower abdominal pain consistent with where   vas deferens were tied as well as ongoing scrotal pain. Denies   any fevers or chills. No discharge, increased erythema or   swelling.          Review of Systems:   A 10 point review of systems was performed and is negative unless   otherwise noted in HPI.          Past Medical History:     Past Medical History:   Diagnosis Date     Anxiety      Depressive disorder      Male-to-female transgender person              Past Surgical History:      Past Surgical History:   Procedure Laterality Date     BREAST SURGERY      augmentation     COSMETIC SURGERY      lip and nose nurgeries     ORCHIECTOMY SCROTAL Bilateral 11/2/2018    Procedure: Bilateral Orchiectomy;  Surgeon: Javid Kendall MD;    Location:  OR             Family History:   Family history was reviewed.      No family history on file.          Social History:     Social History   Substance Use Topics     Smoking status: Current Every Day Smoker     Packs/day: 1.00     Types: Cigarettes     Smokeless tobacco: Never Used     Alcohol use No      Comment: had 4 shots of vodak today             Medications:     No current facility-administered medications on file prior to   encounter.   Current Outpatient  "Prescriptions on File Prior to Encounter:  acetaminophen (TYLENOL) 325 MG tablet Take 2 tablets (650 mg) by   mouth every 4 hours as needed for mild pain   emtricitabine-tenofovir (TRUVADA) 200-300 MG per tablet Take 1   tablet by mouth daily    estradiol valerate (DELESTROGEN) 20 MG/ML injection Inject 4 mg   into the muscle every 7 days Takes on Sundays; INJECT 0.2 ML IM   ONCE A WEEK   FLUoxetine (PROZAC) 20 MG capsule Take 20 mg by mouth daily   ibuprofen (ADVIL/MOTRIN) 600 MG tablet Take 1 tablet (600 mg) by   mouth every 6 hours as needed for other (mild and/or inflammatory   pain)       Current Facility-Administered Medications   Medication     acetaminophen (TYLENOL) tablet 650 mg     alum & mag hydroxide-simethicone (MYLANTA ES/MAALOX  ES)   suspension 30 mL     atenolol (TENORMIN) tablet 50 mg     bisacodyl (DULCOLAX) Suppository 10 mg     chlorproMAZINE (THORAZINE) tablet 100 mg     diazepam (VALIUM) tablet 10 mg     emtricitabine-tenofovir (TRUVADA) 200-300 MG per tablet 1   tablet     [START ON 11/11/2018] estradiol valerate (DELESTROGEN)   injection 4 mg     FLUoxetine (PROzac) capsule 20 mg     folic acid (FOLVITE) tablet 1 mg     hydrOXYzine (ATARAX) tablet 25-50 mg     ibuprofen (ADVIL/MOTRIN) tablet 600 mg     magnesium hydroxide (MILK OF MAGNESIA) suspension 30 mL     multivitamin, therapeutic with minerals (THERA-VIT-M) tablet 1   tablet     nicotine (NICODERM CQ) 21 MG/24HR 24 hr patch 1 patch     nicotine Patch in Place     nicotine patch REMOVAL     nicotine polacrilex (COMMIT) lozenge 2-4 mg     OLANZapine (zyPREXA) tablet 5-10 mg    Or     OLANZapine (zyPREXA) injection 5-10 mg     OLANZapine (zyPREXA) tablet 10 mg     thiamine tablet 100 mg     traZODone (DESYREL) tablet 50 mg            Allergies:   No Known Allergies          Physical Exam:   /88  Pulse 82  Temp 98.1  F (36.7  C) (Tympanic)  Resp   16  Ht 1.803 m (5' 11\")  Wt 61.4 kg (135 lb 6.4 oz)  SpO2 95%    BMI 18.88 " kg/m2   GENERAL: Alert and oriented x 3. NAD.   HEENT: Anicteric sclera. PERRL. Mucous membranes moist.   CV: RRR. S1, S2. No murmurs appreciated.   RESPIRATORY: Effort normal. Lungs CTAB with no wheezing, rales,   rhonchi.   GI: Abdomen soft and non distended with normoactive bowel sounds   present in all quadrants. No tenderness, rebound, guarding.   MUSCULOSKELETAL: No joint swelling or tenderness.   NEUROLOGICAL: No focal deficits. Moves all extremities.   EXTREMITIES: No peripheral edema. Intact bilateral pedal pulses.   SKIN: No jaundice. No rashes. Multiple tattoos, piercing's.          Labs:   CBC:  Recent Labs   Lab Test  11/07/18   1504   WBC  7.5   RBC  3.97*   HGB  13.7   HCT  38.6*   MCV  97   MCH  34.5*   MCHC  35.5   RDW  11.9   PLT  169       CMP:  Recent Labs   Lab Test  11/07/18   1504   NA  136   POTASSIUM  3.8   CHLORIDE  104   CARRIE  8.5   CO2  26   BUN  8   CR  0.72   GLC  77   AST  16   ALT  16   BILITOTAL  0.4   ALBUMIN  4.0   PROTTOTAL  7.6   ALKPHOS  73       TSH:  TSH   Date Value Ref Range Status   11/08/2018 0.47 0.40 - 4.00 mU/L Final             Nelly Mcbride PA-C  Hospitalist Service  [phone]              Alcohol breath test POCT   Result Value Ref Range    Alcohol Breath Test 0.05 (A) 0.00 - 0.01            Consults:   Consultation during this admission received from internal medicine         Hospital Course:   Nay Lainez was admitted to Station 4A with attending Sachin Hernandez MD as a voluntary patient. The patient was placed under status 15 (15 minute checks) to ensure patient safety.     Patient was admitted after ingestion of 5 -10 mg tabs of valium, 5 shots of Nyquil and 3 shots of vodka. Patient reported that she was unable to sleep and was experiencing command hallucinations to harm herself. Patient reported she was on Thorazine 100 mg for about one year and did not feel it was addressing her command hallucinations. Patient's EKG in ED indicated QTc of 502.  "Discussed with patient her overdose and thorazine were probably responsible for the high QTc. Recommended Zyprexa for mood stabilization and to address auditory hallucinations. Patient was agreeable. Zyprexa was started at 10 mg. Thorazine was tapered to 100 mg TID, 100 mg BID for one day , then 100 mg for one day, then discontinue. Patient reported she was able to sleep and her auditory hallucinations improved.      Recommendation was to follow up with primary care due to elevated QTc  Repeat EKG indicated lower QTc of 474 after decreasing thorazine.     Reviewed labs including CMP WNL, CBC with diff WNL except hematocrit 38.6 (low), RBC count 3.97 (low), MCH (34.5 (elevated), HIV non reactive.    Discussed with patient tapering off of valium due to long tern use leading to cognitive and memory issues. Patient stated valium was the only thing that worked to help her manage her anxiety.     Discussed with patient using a lock box for her medication to reduce the risk of overdose. Patient agreed to it. Patient has a service dog and will be picked up by friend who will stay with her.     Nay Lainez did not participate in groups and was visible in the milieu. The patient's symptoms of suicidal ideaiton and command halluciations improved. Patient presents with a stable mood and denies suicidal ideation. Patient reports auditory hallucinations have been reduced to \"mumbles\". She denies command hallucinaitons. She deneis any thoughts of harming any one else.     Patient no longer mets criteria for hospital level of care. She is at moderate risk or relapse due to her psychiatric history.     Nay Lainez was released to home. At the time of discharge Nay Lainez was determined to not be a danger to herself or others.          Discharge Medications:     Current Discharge Medication List      START taking these medications    Details   OLANZapine (ZYPREXA) 10 MG tablet Take 1 tablet (10 mg) by mouth At Bedtime  Qty: " 30 tablet, Refills: 0    Associated Diagnoses: Schizoaffective disorder, bipolar type (H)         CONTINUE these medications which have NOT CHANGED    Details   acetaminophen (TYLENOL) 325 MG tablet Take 2 tablets (650 mg) by mouth every 4 hours as needed for mild pain  Qty: 50 tablet, Refills: 0    Associated Diagnoses: Gender dysphoria      diazepam (VALIUM) 10 MG tablet Take 10 mg by mouth every 6 hours as needed for anxiety      emtricitabine-tenofovir (TRUVADA) 200-300 MG per tablet Take 1 tablet by mouth daily       estradiol valerate (DELESTROGEN) 20 MG/ML injection Inject 4 mg into the muscle every 7 days Takes on Sundays; INJECT 0.2 ML IM ONCE A WEEK      FLUoxetine (PROZAC) 20 MG capsule Take 20 mg by mouth daily      ibuprofen (ADVIL/MOTRIN) 600 MG tablet Take 1 tablet (600 mg) by mouth every 6 hours as needed for other (mild and/or inflammatory pain)  Qty: 30 tablet, Refills: 0    Associated Diagnoses: Gender dysphoria         STOP taking these medications       chlorproMAZINE (THORAZINE) 100 MG tablet Comments:   Reason for Stopping:                    Psychiatric Examination:   Appearance:  awake, alert and adequately groomed  Attitude:  cooperative  Eye Contact:  good  Mood:  better  Affect:  appropriate and in normal range  Speech:  clear, coherent  Psychomotor Behavior:  no evidence of tardive dyskinesia, dystonia, or tics  Thought Process:  logical, linear and goal oriented  Associations:  no loose associations  Thought Content:  no auditory hallucinations present, Pateint states she only hears mumbles , no command hallucinations.   Insight:  fair  Judgment:  fair  Oriented to:  time, person, and place  Attention Span and Concentration:  intact  Recent and Remote Memory:  intact  Language: Able to name objects, Able to repeat phrases and Able to read and write  Fund of Knowledge: appropriate  Muscle Strength and Tone: normal  Gait and Station: Normal         Discharge Plan:         Summary:  You  were admitted on 11/7/2018 due to auditory hallucinations and insomnia.  You were treated by Debra Naegele, APRN, CNS and discharged on 11/09/2018 from Station 4a to home to follow up with your outpatient providers. You have been given referrals for DBT.     Principal Diagnosis:   1.  Schizoaffective disorder, bipolar type  2.  Cannabis use disorder, in remission  3.  Borderline personality disorder     Health Care Follow-up Appointments:   Leatha Trujillo RN, CNS -    [facility] [address] [place] [address]   Phone: [phone] Fax: [phone]     Dialectical Behavioral Therapy (DBT) Referral Services:     [facility] [address]  [place] [address]  [phone]     [facility]  [address]  [place] [address]  [phone]     [facility] (coed groups)  [address]  [phone]  [phone]     [facility] (womenonly  groups)  [address] [place] [address]  [phone] x1 (Intake)     [facility] (women only groups)  [facility]  [address]  [place] [address]  [phone]     [facility] (women only groups)  [address]  [place] [address]  [phone]     [facility]  [address]  [place] [address]  [phone]     [facility]  [address]  [place] [address]  [phone]     [facility]  [address]  [place] [address]  [phone]     Attend all scheduled appointments with your outpatient providers. Call at least 24 hours in advance if you need to reschedule an appointment to ensure continued access to your outpatient providers.   Major Treatments, Procedures and Findings:  You were provided with: a psychiatric assessment, assessed for medical stability, medication  "evaluation and/or management, group therapy and milieu management     Symptoms to Report: Feeling more aggressive, increased confusion, losing more sleep, mood getting worse or thoughts of suicide     Early warning signs can include: Increased depression or anxiety sleep disturbances increased thoughts or behaviors of suicide or self-harm  increased unusual thinking, such as paranoia or hearing voices     Safety and Wellness:  Take all medicines as directed.  Make no changes unless your doctor suggests them. Follow treatment recommendations.  Refrain from alcohol and non-prescribed drugs.  Ask your support system to help you reduce your access to items that could harm yourself or others. If there is a concern for safety, call 911.     Resources:   Crisis Intervention: 718.536.8619 or 784-091-0282 (TTY: 989.169.4785).  Call anytime for help.  National [place] on Mental Illness (www.[place].monse.org): 707.743.9073 or 770-432-5549.  Suicide Awareness Voices of Education (SAVE) (www.save.org): 755-350-UZNV (6116)  National Suicide Prevention Line (www.mentalhealthmn.org): 831-592-XILA (9679)  Mental Health Consumer/Survivor Network of [place] (www.mhcsn.net): 850.318.8484 or 129-686-3989  Mental Health Association of [place] (www.mentalhealth.org): 322.955.7011 or 165-524-2922  [facility] Crisis (COPE) Response - Adult 968 952-4304  Crisis text line: Text \"[place]\" to 866346. Free, confidential, 24/7.  Crisis Intervention: 164.536.5928 or 096-340-3857. Call anytime for help.   [facility] Health Crisis Team - Child: [phone]     The treatment team has appreciated the opportunity to work with you.     If you have any questions or concerns our unit number is [phone]  You may be receiving a follow-up phone call within the next three days from a representative from behavioral health.    _        Attestation:  The patient has been seen and evaluated by me,  Debra A. Naegele, APRN CNS on 11/0+9/2018  Discharge " summary time > 30 minutes

## 2018-11-09 NOTE — PROGRESS NOTES
Acknowledged understanding of discharge instructions and follow-up.  Discharged with all belongings accompanied by a friend.

## 2018-11-09 NOTE — PLAN OF CARE
"Problem: Suicide Risk (Adult)  Goal: Identify Related Risk Factors and Signs and Symptoms  Related risk factors and signs and symptoms are identified upon initiation of [facility] Clinical Practice Guideline (CPG).   Outcome: No Change  RN 48 HOUR ASSESSMENT:    Pt was resting in bed upon approach and welcomed writer in to check-in. Pt states she soreness in her operation area (groin) and that the tylenol and Advil prescribed for her do not help. Pt also turned down an ice pack which was helping the soreness earlier. Pt was happy to report she took a nap today, which she has not done in a while. She also states that she \"actually slept last night.\" Pt states her anxiety is \"high, but I'm okay.\" She rated her anxiety as a 6/10 and denied any depression. Pt has experienced auditory hallucinations since the age of 19. She states today the voices are just \"mumbles.\" Pt ate a good dinner and was visible in the milieu. Pt is not social with peers, but has been seen walking up and down the halls, came out to eat dinner, and attended the dog visitor. The therapy dog was very drawn to her out of all the patients present, which was very pleasant for the pt.     Pt has started taking zyprexa which she states she has not tried before and is optimistic that it will help with the voices. She is being tapered off of thorazine. Pt was sleeping when her 4 PM dose of valium was due, so it was held. Pt is expecting a visit from her  and is looking forward to this.      "

## 2018-11-09 NOTE — PLAN OF CARE
Problem: General Plan of Care (Inpatient Behavioral)  Goal: Discharge Planning    [facility] Relapse Prevention Plan  This writer met with patient to discuss and complete relapse prevention plan.

## 2018-11-09 NOTE — DISCHARGE INSTRUCTIONS
Behavioral Discharge Planning and Instructions      Summary:  You were admitted on 11/7/2018 due to auditory hallucinations and insomnia.  You were treated by Debra Naegele, APRN, CNS and discharged on 11/9/2018 from [facility] to home to follow up with your outpatient providers. You have been given referrals for DBT.    Principal Diagnosis:   1.  Schizoaffective disorder, bipolar type  2.  Cannabis use disorder, in remission  3.  Borderline personality disorder    Health Care Follow-up Appointments:   Leatha Trujillo RN, CNS -  Friday, January 4, 2019 at 820am (You have been placed on her cancel list)  [facility] [address] [facility] [address] [place] [address]   Phone: [phone] Fax: [phone]    Psychiatry Intake - Thursday at 8:00am  Medication Management and Individual Therapy  [facility] - 2 and 3rd Floors, [facility]   [address] [facility] [address] Phone: [phone] fax: [phone]  You must arrive by 8:00am.      Dialectical Behavioral Therapy (DBT) Referral Services:    [facility]  [address]  [place] [address]  [phone]    [facility]  [address] [facility] [address]  [place] [address]  [phone]    [facility] (coed groups)  [address] [facility]  [phone]  [phone]    [facility] (womenonly  groups)  [address] [place] [address]  [phone] (Intake)    [facility] (women only groups)  [facility]  [address] [facility] [address]  [place] [address]  [phone]    [facility] (women only groups)  [address] [facility] [address]  [place] [address]  [phone]    [facility]  [address] [facility] [address]  [place] [address]  [phone]    [facility]  [address] [facility]  [place] [address]  [phone]    [facility]  "[place]  [address] [place] [address]  [phone]    Attend all scheduled appointments with your outpatient providers. Call at least 24 hours in advance if you need to reschedule an appointment to ensure continued access to your outpatient providers.   Major Treatments, Procedures and Findings:  You were provided with: a psychiatric assessment, assessed for medical stability, medication evaluation and/or management, group therapy and milieu management    Symptoms to Report: Feeling more aggressive, increased confusion, losing more sleep, mood getting worse or thoughts of suicide    Early warning signs can include: Increased depression or anxiety sleep disturbances increased thoughts or behaviors of suicide or self-harm  increased unusual thinking, such as paranoia or hearing voices    Safety and Wellness:  Take all medicines as directed.  Make no changes unless your doctor suggests them. Follow treatment recommendations.  Refrain from alcohol and non-prescribed drugs.  Ask your support system to help you reduce your access to items that could harm yourself or others. If there is a concern for safety, call 911.    Resources:   Crisis Intervention: 752.320.4089 or 308-325-2041 (TTY: 706.736.7170).  Call anytime for help.  National [place] on Mental Illness (www.[place].monse.org): 442.521.4711 or 014-940-5852.  Suicide Awareness Voices of Education (SAVE) (www.save.org): 431-509-GIWL (8986)  National Suicide Prevention Line (www.mentalhealthmn.org): 056-051-QEAV (9005)  Mental Health Consumer/Survivor Network of [place] (www.mhcsn.net): 865.338.4436 or 394-646-6767  Mental Health Association of [place] (www.mentalhealth.org): 563.933.4042 or 919-107-1390  [facility] Crisis (COPE) Response - Adult 542 798-1810  Crisis text line: Text \"[place]\" to 227818. Free, confidential, 24/7.  Crisis Intervention: 905.133.9453 or 902-937-8540. Call anytime for help.   [facility] Crisis Team - Child: " [phone]    The treatment team has appreciated the opportunity to work with you.     If you have any questions or concerns our unit number is [phone]  You may be receiving a follow-up phone call within the next three days from a representative from behavioral health.    _

## 2018-11-10 LAB — INTERPRETATION ECG - MUSE: NORMAL

## 2018-11-12 ENCOUNTER — TELEPHONE (OUTPATIENT)
Dept: PHARMACY | Facility: OTHER | Age: 28
End: 2018-11-12

## 2018-11-19 ENCOUNTER — TELEPHONE (OUTPATIENT)
Dept: BEHAVIORAL HEALTH | Facility: CLINIC | Age: 28
End: 2018-11-19

## 2018-11-19 ENCOUNTER — HOSPITAL ENCOUNTER (INPATIENT)
Facility: CLINIC | Age: 28
LOS: 7 days | Discharge: HOME | DRG: 897 | End: 2018-11-26
Attending: EMERGENCY MEDICINE | Admitting: PSYCHIATRY & NEUROLOGY
Payer: COMMERCIAL

## 2018-11-19 DIAGNOSIS — F13.20 BENZODIAZEPINE DEPENDENCE (H): ICD-10-CM

## 2018-11-19 PROBLEM — F13.939 BENZODIAZEPINE WITHDRAWAL (H): Status: ACTIVE | Noted: 2018-11-19

## 2018-11-19 LAB
AMPHETAMINES UR QL SCN: NEGATIVE
BARBITURATES UR QL: NEGATIVE
BENZODIAZ UR QL: POSITIVE
CANNABINOIDS UR QL SCN: NEGATIVE
COCAINE UR QL: NEGATIVE
ETHANOL UR QL SCN: NEGATIVE
OPIATES UR QL SCN: NEGATIVE

## 2018-11-19 PROCEDURE — 25000132 ZZH RX MED GY IP 250 OP 250 PS 637: Performed by: PSYCHIATRY & NEUROLOGY

## 2018-11-19 PROCEDURE — 25000131 ZZH RX MED GY IP 250 OP 636 PS 637: Performed by: PSYCHIATRY & NEUROLOGY

## 2018-11-19 PROCEDURE — 80320 DRUG SCREEN QUANTALCOHOLS: CPT | Performed by: EMERGENCY MEDICINE

## 2018-11-19 PROCEDURE — 80307 DRUG TEST PRSMV CHEM ANLYZR: CPT | Performed by: EMERGENCY MEDICINE

## 2018-11-19 PROCEDURE — 99284 EMERGENCY DEPT VISIT MOD MDM: CPT | Mod: Z6 | Performed by: EMERGENCY MEDICINE

## 2018-11-19 PROCEDURE — 12800012 ZZH R&B CD MH INTERMEDIATE ADULT

## 2018-11-19 PROCEDURE — 25000132 ZZH RX MED GY IP 250 OP 250 PS 637: Performed by: EMERGENCY MEDICINE

## 2018-11-19 PROCEDURE — 99285 EMERGENCY DEPT VISIT HI MDM: CPT | Mod: 25 | Performed by: EMERGENCY MEDICINE

## 2018-11-19 PROCEDURE — 25000131 ZZH RX MED GY IP 250 OP 636 PS 637: Performed by: EMERGENCY MEDICINE

## 2018-11-19 PROCEDURE — HZ2ZZZZ DETOXIFICATION SERVICES FOR SUBSTANCE ABUSE TREATMENT: ICD-10-PCS | Performed by: PSYCHIATRY & NEUROLOGY

## 2018-11-19 RX ORDER — EMTRICITABINE AND TENOFOVIR DISOPROXIL FUMARATE 200; 300 MG/1; MG/1
1 TABLET, FILM COATED ORAL DAILY
Status: DISCONTINUED | OUTPATIENT
Start: 2018-11-19 | End: 2018-11-26 | Stop reason: HOSPADM

## 2018-11-19 RX ORDER — ONDANSETRON 8 MG/1
8 TABLET, ORALLY DISINTEGRATING ORAL ONCE
Status: COMPLETED | OUTPATIENT
Start: 2018-11-19 | End: 2018-11-19

## 2018-11-19 RX ORDER — IBUPROFEN 200 MG
400 TABLET ORAL EVERY 4 HOURS PRN
Status: DISCONTINUED | OUTPATIENT
Start: 2018-11-19 | End: 2018-11-20

## 2018-11-19 RX ORDER — ALUMINA, MAGNESIA, AND SIMETHICONE 2400; 2400; 240 MG/30ML; MG/30ML; MG/30ML
30 SUSPENSION ORAL EVERY 4 HOURS PRN
Status: DISCONTINUED | OUTPATIENT
Start: 2018-11-19 | End: 2018-11-26 | Stop reason: HOSPADM

## 2018-11-19 RX ORDER — HYDROXYZINE HYDROCHLORIDE 25 MG/1
25 TABLET, FILM COATED ORAL EVERY 4 HOURS PRN
Status: DISCONTINUED | OUTPATIENT
Start: 2018-11-19 | End: 2018-11-20

## 2018-11-19 RX ORDER — ESTRADIOL VALERATE 20 MG/ML
4 INJECTION INTRAMUSCULAR
Status: DISCONTINUED | OUTPATIENT
Start: 2018-11-25 | End: 2018-11-26 | Stop reason: HOSPADM

## 2018-11-19 RX ORDER — DIAZEPAM 10 MG
10 TABLET ORAL ONCE
Status: COMPLETED | OUTPATIENT
Start: 2018-11-19 | End: 2018-11-19

## 2018-11-19 RX ORDER — TRAZODONE HYDROCHLORIDE 50 MG/1
50 TABLET, FILM COATED ORAL
Status: DISCONTINUED | OUTPATIENT
Start: 2018-11-19 | End: 2018-11-20

## 2018-11-19 RX ORDER — BISACODYL 10 MG
10 SUPPOSITORY, RECTAL RECTAL DAILY PRN
Status: DISCONTINUED | OUTPATIENT
Start: 2018-11-19 | End: 2018-11-26 | Stop reason: HOSPADM

## 2018-11-19 RX ORDER — ACETAMINOPHEN 325 MG/1
650 TABLET ORAL EVERY 4 HOURS PRN
Status: DISCONTINUED | OUTPATIENT
Start: 2018-11-19 | End: 2018-11-26 | Stop reason: HOSPADM

## 2018-11-19 RX ORDER — OLANZAPINE 10 MG/1
10 TABLET ORAL AT BEDTIME
Status: DISCONTINUED | OUTPATIENT
Start: 2018-11-19 | End: 2018-11-26 | Stop reason: HOSPADM

## 2018-11-19 RX ORDER — PHENOBARBITAL 64.8 MG/1
64.8 TABLET ORAL 2 TIMES DAILY
Status: DISCONTINUED | OUTPATIENT
Start: 2018-11-19 | End: 2018-11-20

## 2018-11-19 RX ORDER — ONDANSETRON 4 MG/1
4 TABLET, FILM COATED ORAL EVERY 6 HOURS PRN
Status: DISCONTINUED | OUTPATIENT
Start: 2018-11-19 | End: 2018-11-26 | Stop reason: HOSPADM

## 2018-11-19 RX ADMIN — PHENOBARBITAL 64.8 MG: 64.8 TABLET ORAL at 17:05

## 2018-11-19 RX ADMIN — OLANZAPINE 10 MG: 10 TABLET, FILM COATED ORAL at 21:10

## 2018-11-19 RX ADMIN — HYDROXYZINE HYDROCHLORIDE 25 MG: 25 TABLET ORAL at 17:28

## 2018-11-19 RX ADMIN — EMTRICITABINE AND TENOFOVIR DISOPROXIL FUMARATE 1 TABLET: 200; 300 TABLET, FILM COATED ORAL at 17:05

## 2018-11-19 RX ADMIN — FLUOXETINE 20 MG: 20 CAPSULE ORAL at 17:55

## 2018-11-19 RX ADMIN — DIAZEPAM 10 MG: 10 TABLET ORAL at 12:24

## 2018-11-19 RX ADMIN — NICOTINE POLACRILEX 8 MG: 4 LOZENGE ORAL at 19:38

## 2018-11-19 RX ADMIN — ONDANSETRON 8 MG: 8 TABLET, ORALLY DISINTEGRATING ORAL at 10:52

## 2018-11-19 RX ADMIN — ONDANSETRON HYDROCHLORIDE 4 MG: 4 TABLET, FILM COATED ORAL at 17:55

## 2018-11-19 RX ADMIN — IBUPROFEN 400 MG: 200 TABLET, FILM COATED ORAL at 18:17

## 2018-11-19 RX ADMIN — PHENOBARBITAL 64.8 MG: 64.8 TABLET ORAL at 21:10

## 2018-11-19 RX ADMIN — NICOTINE POLACRILEX 8 MG: 4 LOZENGE ORAL at 17:05

## 2018-11-19 RX ADMIN — NICOTINE POLACRILEX 8 MG: 4 LOZENGE ORAL at 18:28

## 2018-11-19 ASSESSMENT — ACTIVITIES OF DAILY LIVING (ADL)
GROOMING: INDEPENDENT
DRESS: INDEPENDENT
ORAL_HYGIENE: INDEPENDENT

## 2018-11-19 NOTE — IP AVS SNAPSHOT
[facility]    [address] [place] [address]    Phone:  [phone]                                       After Visit Summary   11/19/2018    Nay Lainez    MRN: [id number]           After Visit Summary Signature Page     I have received my discharge instructions, and my questions have been answered. I have discussed any challenges I see with this plan with the nurse or doctor.    ..........................................................................................................................................  Patient/Patient Representative Signature      ..........................................................................................................................................  Patient Representative Print Name and Relationship to Patient    ..................................................               ................................................  Date                                   Time    ..........................................................................................................................................  Reviewed by Signature/Title    ...................................................              ..............................................  Date                                               Time          22EPIC Rev 08/18

## 2018-11-19 NOTE — PHARMACY-ADMISSION MEDICATION HISTORY
Admission medication history for the November 19, 2018 admission is complete.     Interview sources:  Patient, Epic Dispense History, chart review (Med hx completed on 11/7/18)    Reliability of source: Good - pt knew the names and doses of most medications; verified all with Epic Dispense History     Medication compliance: Good - pt reports no concerns with compliance, she took all medications yesterday, refill history is up to date    Preferred Outpatient Pharmacy: [facility]    Changes made to PTA medication list (reason)  Added: none  Deleted: ibuprofen and acetaminophen (were prescribed for post-op, not routine meds)   Changed: diazepam 10 mg every 6 hours prn --> diazepam 10 mg, take 1 tablet four times daily (per prescription from Epic Dispense History)    Additional medication history information:   - recently hospitalized here from 11/7-11/9 and was started on olanzapine 10 mg at bedtime. See med history note on 11/7/18 for information regarding prior meds.   - diazepam 10 mg tablets dispensed on 10/24/18 for quantity #120 tablets (30 day supply)   - Truvada is for PrEP  - estradiol - put uses 0.2 mL (4 mg) every Sunday- confirmed dose with Vandana. Patient last dosed herself on 11/18/18.    Prior to Admission Medication List:  Prior to Admission medications    Medication Sig Last Dose Taking? Auth Provider   diazepam (VALIUM) 10 MG tablet Take 10 mg by mouth 4 times daily  11/19/2018 at AM Yes Unknown, Entered By History   emtricitabine-tenofovir (TRUVADA) 200-300 MG per tablet Take 1 tablet by mouth daily  11/18/2018 at AM Yes Reported, Patient   estradiol valerate (DELESTROGEN) 20 MG/ML injection Inject 4 mg into the muscle every 7 days Takes on Sundays; INJECT 0.2 ML IM ONCE A WEEK 11/18/2018 Yes Unknown, Entered By History   FLUoxetine (PROZAC) 20 MG capsule Take 20 mg by mouth every morning  11/18/2018 at AM Yes Unknown, Entered By History   OLANZapine (ZYPREXA) 10 MG tablet Take 1 tablet (10 mg) by  mouth At Bedtime 11/18/2018 at PM Yes Naegele, Debra Ann, APRN CNS       Time spent: 30 minutes    Medication history completed by:   Tricia Piña PharmD  [facility]  Available daily from 1-9 PM: phone [phone], pager [phone]

## 2018-11-19 NOTE — TELEPHONE ENCOUNTER
S:  Pt seen in the ED requesting detox from valium.    B:  Info per  and pt's chart :  Pt reports she is prescribed valium 10 mg 2-4 x per day.  She reports having taken 80 mg daily since discharge from  11/9.  She states her only source is her Dr.  She sees  at [facility].  She states she has tried cutting back on her own but gets very shaky and vomits.  She reports her L.U. Was this AM.   She denies other chemicals.  Pt has a hx of schizoaffective d/o but denies any acute current sx.  Pt is M to F transgender.  She is 2 weeks post bilateral orchiectomy  and is having some pain and swelling in her R groin, but it does not appear to be infected. Per ED MD, no hallucinations, no acute  issues at this time.  U tox positive for benzos.  Medically cleared.  Please see chart for further info.    A:  Needing hospitalization for safe detox.    R:  Admit to 3A   CD     accepts for herself     Vol               Unit has concerns and want to talk with  before accepting.    -  Eleazar RONDON informed.   1:40  -  ED texted and called   1:40  -  Skyla gave OK  1:40

## 2018-11-19 NOTE — IP AVS SNAPSHOT
MRN:[id number]                      After Visit Summary   11/19/2018    Nay Lainez    MRN: [id number]           Thank you!     Thank you for choosing [facility] for your care. Our goal is always to provide you with excellent care.        Patient Information     Date Of Birth          1990        Designated Caregiver       Most Recent Value    Caregiver    Will someone help with your care after discharge? yes    Name of designated caregiver  Kvng    Phone number of caregiver [phone]    Caregiver address shares address with patient      About your hospital stay     You were admitted on:  November 19, 2018 You last received care in the: [facility]    You were discharged on:  November 26, 2018       Who to Call     For medical emergencies, please call 911.  For non-urgent questions about your medical care, please call your primary care provider or clinic, [phone]          Attending Provider     Provider Specialty    Colby Chase MD Emergency Medicine    [facility] Cent[facility], Mariaelena Acevedo MD Psychiatry    Rashmi Love MD Psychiatry       Primary Care Provider Office Phone # Fax #    Ventura Morelos [phone]      Your next 10 appointments already scheduled     Nov 28, 2018  1:00 PM CST   (Arrive by 12:45 PM)   Post-Op with Javid Kendall MD   [facility] ([facility])    [address] [place] [address] [phone]              Further instructions from your care team       Behavioral Discharge Planning and Instructions  THANK YOU FOR CHOOSING THE [address]  [phone]    Summary: You were admitted to Station 3A on 11/19/2018 for detoxification from benzodiazepines.  A medical exam was performed that included lab work. You have met with a  and opted to ***.  Please take care and make  your recovery a daily priority, Nay! It was a pleasure working with you and the entire treatment team here wishes you the very best in your recovery!     Recommendation:  ***    Main Diagnoses:  Per Dr. Love;  Sedative hypnotic use disorder  Schizoaffective bipolar type A  Transgender post-surgery post-orchiectomy.     Major Treatments, Procedures and Findings:  You were treated for benzodiazepine detoxification using phenobarbital. As an outpatient you will be prescribed ***, please take this medication as prescribed until it is gone. You *** a chemical dependency assessment. You had labs drawn and those results were reviewed with you. Please take a copy of your lab work with you to your next primary care physician appointment.    Symptoms to Report:  If you experience more anxiety, confusion, sleeplessness, deep sadness or thoughts of suicide, notify your treatment team or notify your primary care physician. IF ANY OF THE SYMPTOMS YOU ARE EXPERIENCING ARE A MEDICAL EMERGENCY CALL 911 IMMEDIATELY.     Lifestyle Adjustment: Adjust your lifestyle to get enough sleep, relaxation, exercise and good nutrition. Continue to develop healthy coping skills to decrease stress and promote a sober living environment. Do not use mood altering substances including alcohol, illegal drugs or addictive medications other than what is currently prescribed.     Disposition:     Follow-up Appointment:   Appointments date/time: Dr. Phoenix 12/03/18 @ 10:40AM                                          Dr. Morelos 12/06/18 @ 12:00 PM  Resources:   AA/NA and Sponsors are excellent resources for support and you can find one at any support group meeting.   http://www.aastpaul.org (then click meetings) This for the [facility] AA meetings  http://aamin[place].org/meetings/   This is for the [facility] AA meetings  http://www.nam[place].us (then click find a  Meeting) This is for [facility] NA   SMART Recovery - self  management for addiction recovery:  www.smartrecovery.org  Pathways ~ A Health Crisis Resource & Support Center:  773.713.6947.  http://www.harmreduction.org  [facility] 979-585-1606  Support Group:  AA/NA and Sponsor/support.  National [place] on Mental Illness (www.[place].monse.org): 758.597.1815 or 419-594-6322.  Alcoholics Anonymous (www.alcoholics-anonymous.org): Check your phone book for your local chapter.  Suicide Awareness Voices of Education (SAVE) (www.save.[employer]): 267-850-FVQP (5583)  National Suicide Prevention Line (www.mentalBreaktime Studiosmn.org): 053-829-DDNT (5448)  Mental Health Consumer/Survivor Network of [place] (www.mhcsn.net): 895.127.5818 or 144-843-2147  Mental Health Association of [place] (www.mentalhealth.org): 146.196.1996 or 821-493-3443   Substance Abuse and Mental Health Services (www.samhsa.gov)    [place] Recovery Connection ([facility])  [facility] connects people seeking recovery to resources that help foster and sustain long-term recovery.  Whether you are seeking resources for treatment, transportation, housing, job training, education, health care or other pathways to recovery, [facility] is a great place to start.  798.662.7385.  www.[facility]org    General Medication Instructions:   See your medication sheet(s) for instructions.   Take all medications as prescribed.  Make no changes unless your doctor suggests them.   Go to all your doctor visits.  Be sure to have all your required lab tests. This way, your medicines can be refilled on time.  Do not use any forms of alcohol.    Please Note:  If you have any questions at anytime after you are discharged please call the [facility] detox unit 3AW unit at [phone].  [facility], Behavioral Intake [phone]  Medical Records call [phone]  Outpatient Behavioral Intake call [phone]  LP+ Wait List/Bed Availability call [phone]    Please take this discharge folder with you to  "all your follow up appointments, it contains your lab results, diagnosis, medication list and discharge recommendations.      THANK YOU FOR CHOOSING THE [facility]           Pending Results     No orders found from 2018 to 2018.            Statement of Approval     Ordered          18 0838  I have reviewed and agree with all the recommendations and orders detailed in this document.  EFFECTIVE NOW     Approved and electronically signed by:  Rashmi Love MD             Admission Information     Date & Time Provider Department Dept. Phone    2018 Rashmi Love MD [facility] [phone]      Your Vitals Were     Blood Pressure Pulse Temperature Respirations Height Weight    123/83 75 97.5  F (36.4  C) (Tympanic) 16 1.803 m (5' 11\") 63.5 kg (140 lb)    Pulse Oximetry BMI (Body Mass Index)                96% 19.53 kg/m2          MyChart Information     [employer] lets you send messages to your doctor, view your test results, renew your prescriptions, schedule appointments and more. To sign up, go to [email or web address] . Click on \"Log in\" on the left side of the screen, which will take you to the Welcome page. Then click on \"Sign up Now\" on the right side of the page.     You will be asked to enter the access code listed below, as well as some personal information. Please follow the directions to create your username and password.     Your access code is: [email or web address]  Expires: 2018  5:31 AM     Your access code will  in 90 days. If you need help or a new code, please call your [facility] clinic or [phone].        Care EveryWhere ID     This is your Care EveryWhere ID. This could be used by other organizations to access your [facility] medical records  [id number]        Equal Access to Services     ELIESER CASILLAS AH: Kristi Stewart, doroteo mccabe, denver smith " lamagdalena [facility] [phone].    ATENCIÓN: Si habla rahelañol, tiene a knight disposición servicios gratuitos de asistencia lingüística. Cody adams [phone].    We comply with applicable federal civil rights laws and [place] laws. We do not discriminate on the basis of race, color, national origin, age, disability, sex, sexual orientation, or gender identity.               Review of your medicines      START taking        Dose / Directions    nicotine polacrilex 4 MG lozenge   Used for:  Benzodiazepine dependence (H)        Dose:  4-8 mg   Place 1-2 lozenges (4-8 mg) inside cheek every hour as needed for other (nicotine withdrawal symptoms)   Quantity:  360 tablet   Refills:  0         CONTINUE these medicines which have NOT CHANGED        Dose / Directions    estradiol valerate 20 MG/ML injection   Commonly known as:  DELESTROGEN        Dose:  4 mg   Inject 4 mg into the muscle every 7 days Takes on Sundays; INJECT 0.2 ML IM ONCE A WEEK   Refills:  0       OLANZapine 10 MG tablet   Commonly known as:  zyPREXA   Used for:  Schizoaffective disorder, bipolar type (H)        Dose:  10 mg   Take 1 tablet (10 mg) by mouth At Bedtime   Quantity:  30 tablet   Refills:  0       PROZAC 20 MG capsule   Generic drug:  FLUoxetine        Dose:  20 mg   Take 20 mg by mouth every morning   Refills:  0       TRUVADA 200-300 MG per tablet   Indication:  PrEP   Generic drug:  emtricitabine-tenofovir        Dose:  1 tablet   Take 1 tablet by mouth daily   Refills:  0         STOP taking     diazepam 10 MG tablet   Commonly known as:  VALIUM                Where to get your medicines      These medications were sent to [place], [place] - [address]  [address] [address]     Phone:  [phone]     nicotine polacrilex 4 MG lozenge                Protect others around you: Learn how to safely use, store and throw away your medicines at www.disposemymeds.org.             Medication  List: This is a list of all your medications and when to take them. Check marks below indicate your daily home schedule. Keep this list as a reference.      Medications           Morning Afternoon Evening Bedtime As Needed    estradiol valerate 20 MG/ML injection   Commonly known as:  DELESTROGEN   Inject 4 mg into the muscle every 7 days Takes on Sundays; INJECT 0.2 ML IM ONCE A WEEK   Last time this was given:  4 mg on 11/25/2018  9:12 AM            Last dose was 11/25/18.                       nicotine polacrilex 4 MG lozenge   Place 1-2 lozenges (4-8 mg) inside cheek every hour as needed for other (nicotine withdrawal symptoms)   Last time this was given:  8 mg on 11/26/2018 10:33 AM                                OLANZapine 10 MG tablet   Commonly known as:  zyPREXA   Take 1 tablet (10 mg) by mouth At Bedtime   Last time this was given:  10 mg on 11/25/2018  9:45 PM                                   PROZAC 20 MG capsule   Take 20 mg by mouth every morning   Last time this was given:  20 mg on 11/26/2018  8:45 AM   Generic drug:  FLUoxetine                                   TRUVADA 200-300 MG per tablet   Take 1 tablet by mouth daily   Last time this was given:  1 tablet on 11/26/2018  8:45 AM   Generic drug:  emtricitabine-tenofovir

## 2018-11-19 NOTE — ED NOTES
This patient was identified by our triage system as having a potential for self-harm. I have performed a brief in-person assessment of the patient s needs for their safety while in our Emergency Department. Based on my preliminarily assessment, I have no reason to believe the patient is in imminent danger of self-harm while undergoing their evaluation. I believe the patient will be safe during their evaluation in the Emergency Department under our established protocols without 1:1 supervision at this time.     MD Salvatore Mir Ford Christian, MD  11/19/18 1041

## 2018-11-19 NOTE — ED NOTES
..  ED to Behavioral Floor Handoff    SITUATION  Nay Lainez is a 27 year old male who speaks English and lives in a home with a spouse The patient arrived in the ED by private car from emergency room with a complaint of Addiction Problem (Here for detox from valium )  .The patient's current symptoms started/worsened 1 day(s) ago and during this time the symptoms have increased.   In the ED, pt was diagnosed with   Final diagnoses:   Benzodiazepine dependence (H)        Initial vitals were: BP: 125/75  Heart Rate: 84  Temp: 97.6  F (36.4  C)  Resp: 18  Weight: 63.9 kg (140 lb 12.8 oz)  SpO2: 97 %   --------  Is the patient diabetic? No   If yes, last blood glucose? --     If yes, was this treated in the ED? --  --------  Is the patient inebriated (ETOH) No or Impaired on other substances? No  MSSA done? N/A  Last MSSA score: --    Were withdrawal symptoms treated? N/A  Does the patient have a seizure history? No. If yes, date of most recent seizure--  --------  Is the patient patient experiencing suicidal ideation? denies current or recent suicidal ideation     Homicidal ideation? denies current or recent homicidal ideation or behaviors.    Self-injurious behavior/urges? denies current or recent self injurious behavior or ideation.  ------  Was pt aggressive in the ED No  Was a code called No  Is the pt now cooperative? Yes  -------  Meds given in ED:   Medications   ondansetron (ZOFRAN-ODT) ODT tab 8 mg (8 mg Oral Given 11/19/18 1052)      Family present during ED course? Yes  Family currently present? Yes    BACKGROUND  Does the patient have a cognitive impairment or developmental disability? No  Allergies: No Known Allergies.   Social demographics are   Social History     Social History     Marital status:      Spouse name: N/A     Number of children: N/A     Years of education: N/A     Social History Main Topics     Smoking status: Current Every Day Smoker     Packs/day: 2.00     Types: Cigarettes      Smokeless tobacco: Never Used     Alcohol use No      Comment: had 4 shots of vodak today     Drug use: No      Comment: every few days     Sexual activity: Yes     Other Topics Concern     None     Social History Narrative    Born in [place] and primarily raised by mother as father was traveling for business. No abuse or growing up completed school on time and went to some college. 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  about 3 years ago and lives with  and apartment. Never in the  no children and no access to guns. Erns income by prostitution.        ASSESSMENT  Labs results Labs Ordered and Resulted from Time of ED Arrival Up to the Time of Departure from the ED - No data to display   Imaging Studies: No results found for this or any previous visit (from the past 24 hour(s)).   Most recent vital signs /75  Temp 97.6  F (36.4  C) (Oral)  Resp 18  Wt 63.9 kg (140 lb 12.8 oz)  SpO2 97%  BMI 19.64 kg/m2   Abnormal labs/tests/findings requiring intervention:---   Pain control: fair  Nausea control: fair    RECOMMENDATION  Are any infection precautions needed (MRSA, VRE, etc.)? No If yes, what infection? --  ---  Does the patient have mobility issues? with stand-by assist. If yes, what device does the pt use? --- recent surgery x2 weeks ago, pt walks with 1 person standby  ---  Is patient on 72 hour hold or commitment? No If on 72 hour hold, have hold and rights been given to patient? No  Are admitting orders written if after 10 p.m. ?N/A  Tasks needing to be completed:---     Judie lora-- main ed   [phone]   [phone]

## 2018-11-19 NOTE — ED PROVIDER NOTES
History     Chief Complaint   Patient presents with     Treatment Plan     Here for detox from valium      HPI  Nay Lainez is a 27 year old transgender gender male to female, who presents to the ER for detox from Valium.  Patient states that over the last 4 years she has been taking 40-80 mg of Valium daily.  Patient states her dose was titrated up because she has a high tolerance for Thorazine and that she has been on in the past for her schizoaffective disorder.  Patient states that she was unable to sleep and therefore started taking Valium.  Patient states that she has tried to detox herself off Valium at home a couple times in the last few years without success.  Patient states her last use was this morning.  Patient admits to some depression but no suicidal ideation and no suicidal plan at this time.  Nursing personnel point out the patient was recently admitted for an overdose without suicidal intent.  Patient states she started to feel nauseated but has had no fevers no vomiting diarrhea melena or bright blood per rectum.  Patient denies any homicidal intent.  Patient denies any hallucinations or delusions.  Patient does state that she has pain and swelling in her right groin status post bilateral orchiectomy done 2 weeks ago.  Patient denies any redness or fevers and denies any difficulty urinating.    I have reviewed the Medications, Allergies, Past Medical and Surgical History, and Social History in the [employer] system.    Past Medical History:   Diagnosis Date     Anxiety      Depressive disorder      Male-to-female transgender person      Previous Medications    ACETAMINOPHEN (TYLENOL) 325 MG TABLET    Take 2 tablets (650 mg) by mouth every 4 hours as needed for mild pain    DIAZEPAM (VALIUM) 10 MG TABLET    Take 10 mg by mouth every 6 hours as needed for anxiety    EMTRICITABINE-TENOFOVIR (TRUVADA) 200-300 MG PER TABLET    Take 1 tablet by mouth daily     ESTRADIOL VALERATE (DELESTROGEN) 20 MG/ML  INJECTION    Inject 4 mg into the muscle every 7 days Takes on Sundays; INJECT 0.2 ML IM ONCE A WEEK    FLUOXETINE (PROZAC) 20 MG CAPSULE    Take 20 mg by mouth daily    IBUPROFEN (ADVIL/MOTRIN) 600 MG TABLET    Take 1 tablet (600 mg) by mouth every 6 hours as needed for other (mild and/or inflammatory pain)    OLANZAPINE (ZYPREXA) 10 MG TABLET    Take 1 tablet (10 mg) by mouth At Bedtime     No Known Allergies     Social History     Social History     Marital status:      Spouse name: N/A     Number of children: N/A     Years of education: N/A     Occupational History     Not on file.     Social History Main Topics     Smoking status: Current Every Day Smoker     Packs/day: 2.00     Types: Cigarettes     Smokeless tobacco: Never Used     Alcohol use No      Comment: had 4 shots of vodak today     Drug use: No      Comment: every few days     Sexual activity: Yes     Other Topics Concern     Not on file     Social History Narrative    Born in [place] and primarily raised by mother as father was traveling for business. No abuse or growing up completed school on time and went to some college. 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  about 3 years ago and lives with  and apartment. Never in the  no children and no access to guns. Erns income by prostitution.     Past Surgical History:   Procedure Laterality Date     BREAST SURGERY      augmentation     COSMETIC SURGERY      lip and nose nurgeries     ORCHIECTOMY SCROTAL Bilateral 11/2/2018    Procedure: Bilateral Orchiectomy;  Surgeon: Javid Kendall MD;  Location:  OR     No family history on file.    Review of Systems   All other systems reviewed and are negative.      Physical Exam   BP: 125/75  Heart Rate: 84  Temp: 97.6  F (36.4  C)  Resp: 18  Weight: 63.9 kg (140 lb 12.8 oz)  SpO2: 97 %      Physical Exam   Constitutional: He is oriented to person, place, and time.   Multiple  piercings and tattoos but alert and conversant   HENT:   Head: Atraumatic.   Eyes: EOM are normal. Pupils are equal, round, and reactive to light.   Neck: Neck supple.   Cardiovascular: Normal heart sounds.    Pulmonary/Chest: Breath sounds normal. He has no wheezes. He has no rales.   Abdominal: Soft. There is no tenderness.   Patient does have some slight swelling over the area of the right groin with minimal swelling on the left side.  This may represent a hydrocele but does not seem to be an abscess, or cellulitis at this point.  Patient may need urology to evaluate this and follow while she is in the hospital.   Musculoskeletal: He exhibits no edema or tenderness.   Neurological: He is alert and oriented to person, place, and time. No cranial nerve deficit.   Grossly intact and symmetric   Skin: No rash noted.   Psychiatric: He has a normal mood and affect.       ED Course     ED Course     Procedures        Urine sent for drug screen    Urology consult order placed    Assessments & Plan (with Medical Decision Making)     I have reviewed the nursing notes.    Case discussed with intake and a bed will be arranged on 3A.    Consult for urology placed in [facility].    I have reviewed the findings, diagnosis, and plan with the patient.    Final diagnoses:   Benzodiazepine dependence (H)     Colby Chase MD    11/19/2018   [facility], [place], EMERGENCY DEPARTMENT     Colby Chase MD  11/19/18 1053

## 2018-11-19 NOTE — PROGRESS NOTES
11/19/18 1629   Patient Belongings   Did you bring any home meds/supplements to the hospital?  No   Patient Belongings other (see comments)   Disposition of Belongings Other (see comment)  (storage bin, bin at desk)   Belongings Search Yes   Clothing Search Yes   Second Staff samir, fatma   storage bin: jacket, backpack, black tote, clogs, lighter, keys  Bin at desk: cell phone, charging cord  A               Admission:  I am responsible for any personal items that are not sent to the safe or pharmacy.  [facility] is not responsible for loss, theft or damage of any property in my possession.    Signature:  _________________________________ Date: _______  Time: _____                                              Staff Signature:  ____________________________ Date: ________  Time: _____      2nd Staff person, if patient is unable/unwilling to sign:    Signature: ________________________________ Date: ________  Time: _____     Discharge:  [facility] has returned all of my personal belongings:    Signature: _________________________________ Date: ________  Time: _____                                          Staff Signature:  ____________________________ Date: ________  Time: _____

## 2018-11-20 PROCEDURE — 25000132 ZZH RX MED GY IP 250 OP 250 PS 637: Performed by: PSYCHIATRY & NEUROLOGY

## 2018-11-20 PROCEDURE — 25000131 ZZH RX MED GY IP 250 OP 636 PS 637: Performed by: PSYCHIATRY & NEUROLOGY

## 2018-11-20 PROCEDURE — 99221 1ST HOSP IP/OBS SF/LOW 40: CPT | Mod: AI | Performed by: PSYCHIATRY & NEUROLOGY

## 2018-11-20 PROCEDURE — 12800012 ZZH R&B CD MH INTERMEDIATE ADULT

## 2018-11-20 PROCEDURE — 99207 ZZC NO CHARGE TRIAGED PS: CPT | Performed by: PHYSICIAN ASSISTANT

## 2018-11-20 RX ORDER — PHENOBARBITAL 32.4 MG/1
32.4 TABLET ORAL DAILY
Status: DISCONTINUED | OUTPATIENT
Start: 2018-11-21 | End: 2018-11-23

## 2018-11-20 RX ORDER — IBUPROFEN 200 MG
400 TABLET ORAL EVERY 6 HOURS PRN
Status: DISCONTINUED | OUTPATIENT
Start: 2018-11-20 | End: 2018-11-21

## 2018-11-20 RX ORDER — PHENOBARBITAL 32.4 MG/1
32.4 TABLET ORAL EVERY MORNING
Status: DISCONTINUED | OUTPATIENT
Start: 2018-11-20 | End: 2018-11-20

## 2018-11-20 RX ORDER — PHENOBARBITAL 64.8 MG/1
64.8 TABLET ORAL AT BEDTIME
Status: COMPLETED | OUTPATIENT
Start: 2018-11-20 | End: 2018-11-20

## 2018-11-20 RX ADMIN — NICOTINE POLACRILEX 8 MG: 4 LOZENGE ORAL at 19:03

## 2018-11-20 RX ADMIN — ONDANSETRON HYDROCHLORIDE 4 MG: 4 TABLET, FILM COATED ORAL at 19:29

## 2018-11-20 RX ADMIN — OLANZAPINE 10 MG: 10 TABLET, FILM COATED ORAL at 21:13

## 2018-11-20 RX ADMIN — ONDANSETRON HYDROCHLORIDE 4 MG: 4 TABLET, FILM COATED ORAL at 08:18

## 2018-11-20 RX ADMIN — EMTRICITABINE AND TENOFOVIR DISOPROXIL FUMARATE 1 TABLET: 200; 300 TABLET, FILM COATED ORAL at 08:18

## 2018-11-20 RX ADMIN — PHENOBARBITAL 64.8 MG: 64.8 TABLET ORAL at 21:13

## 2018-11-20 RX ADMIN — NICOTINE POLACRILEX 8 MG: 4 LOZENGE ORAL at 18:05

## 2018-11-20 RX ADMIN — IBUPROFEN 400 MG: 200 TABLET, FILM COATED ORAL at 13:26

## 2018-11-20 RX ADMIN — PHENOBARBITAL 64.8 MG: 64.8 TABLET ORAL at 08:18

## 2018-11-20 RX ADMIN — FLUOXETINE HYDROCHLORIDE 20 MG: 20 CAPSULE ORAL at 08:18

## 2018-11-20 RX ADMIN — NICOTINE POLACRILEX 8 MG: 4 LOZENGE ORAL at 13:26

## 2018-11-20 RX ADMIN — NICOTINE POLACRILEX 8 MG: 4 LOZENGE ORAL at 08:18

## 2018-11-20 RX ADMIN — NICOTINE POLACRILEX 8 MG: 4 LOZENGE ORAL at 16:19

## 2018-11-20 RX ADMIN — NICOTINE POLACRILEX 8 MG: 4 LOZENGE ORAL at 20:29

## 2018-11-20 RX ADMIN — NICOTINE POLACRILEX 8 MG: 4 LOZENGE ORAL at 10:26

## 2018-11-20 ASSESSMENT — ACTIVITIES OF DAILY LIVING (ADL)
GROOMING: INDEPENDENT
ORAL_HYGIENE: INDEPENDENT
DRESS: STREET CLOTHES
LAUNDRY: WITH SUPERVISION

## 2018-11-20 NOTE — PROGRESS NOTES
Pt MSSA scores today for benzodiazepine withdrawal are 8 and 7. Phenobarbital administered as ordered. Pt appeared a bit drowsy after the morning dose. Dr. Love aware and modified the dosing starting tomorrow so the pt receives a smaller dose of phenobarbital in the morning and then a larger dose at bedtime in attempt to alleviate any drowsiness during waking hours. Pt also seen by Urology today for right groin swelling after surgery. Awaiting potential starting of antibiotics by Urology. Pt denies any suicidal ideation plans or intent. Appears blunted and flat but denies any depression or anxiety at this time. Endorses happiness and hopefulness.

## 2018-11-20 NOTE — H&P
"Admitted:     11/19/2018      Patient is a 27-year-old transgendered male to female, unemployed, , does not have any children.      CHIEF COMPLAINT:  \"I'm out of Valium\".      HISTORY OF PRESENT ILLNESS:  The patient was just discharged from the youth young adult unit on 11/09 she began to overtake Valium.  She is prescribed 40 mg of it and she has been taking 40-80 mg.  She has a tolerance to it and has withdrawal from it.  She had loss of control and she relates that she needs to get help and came here.  On 11/09 she was hospitalized for a suicide attempt for it, she was having nausea and felt jittery and she realized that she needs to get help and brought herself here.  She does not have any suicidal or homicidal ideation, plan or intent is and how individuals or visual hallucinations.  She says her depression is stable at this time.  When she gets depressed she eats too much.  Previously patient would hear voices.  She is psychiatrically managed by a psychiatrist at [facility].      PAST PSYCHIATRIC HISTORY:   1.  Multiple psychiatric hospitalizations, 2 hospitalizations in 2017.   2.  Two hospitalized in 2016 one in [place] and one in [facility].  She has tried numerous medications including Haldol, Seroquel, Risperdal, which was just Thorazine.  She also tried   Depakote and lithium.      MEDICAL HISTORY:  She takes Truvada for HIV prophylaxis.        RECENT SURGERY:  She had orchiectomy scrotal done on 11/02.  She also had breast augmentation.      SOCIAL HISTORY:  The patient is unemployed and lives in [facility] in [place].   for 4 years.  She has a service dog.      PHYSICAL EXAMINATION:   VITAL SIGNS:  The patient's vitals are as below, temperature of 98, pulse of 93, heart rate of 84, respiration rate of 16, blood pressure 115/82.      MENTAL STATUS EXAMINATION:  The patient is a transgendered male to female, numerous tattoos and various eye piercing.  She has adequate " grooming, adequate hygiene.  Maintains good eye contact.  Cooperative.  No psychomotor abnormalities.  No gait problems.  Mood is sad.  Affect is congruent.  Speech is spontaneous.  Normal rate, less logical in thinking, loose association.  Insight and judgment are limited.  Alert, oriented x 3.  Recent memory, language, fund of knowledge are all adequate.      DIAGNOSES:   AXIS I:  Sedative hypnotic use disorder, schizoaffective bipolar type A transgender post-surgery post-orchiectomy.      PLAN:  The patient will be detoxed off Valium using phenobarbital, will be given 120 mg in divided doses, 30 mg in the morning and 90 mg at night.  We will watch for sedation.  The patient can start the taper on day 7 off them.  The patient will be continued on her present medications which include Truvada, EstroGel, Prozac 20 mg, olanzapine 10 mg.  The patient will be seen by case management and Urology consult was placed this as well as examination, the patient is alert, oriented x5.  The patient will be transferred to Dr. Cotto who will resume care on the patient.         PRINCESS DAVILA MD             D: 2018   T: 2018   MT: CHELI      Name:     ROSY DIEHL   MRN:      [id number]        Account:      [id number]   :      1990        Admitted:     2018                   Document: [id number]

## 2018-11-20 NOTE — PROGRESS NOTES
"Nursing Admission Note: Voluntary admission to 3A from ED.  Seeking help to withdraw from Valium.  Admits to abusing prescription (up to 40mg daily) and has been taking 60-80mg per day over the last 2 weeks. Understands that withdrawing on her own might result in seizures and gave this as reason for seeking help now.  Stated plan is to return home after detox.  Underwent orchiectomy (related to sex change process) recently and is concerned about continuing groin pain. Would like to have this evaluated. Reported history of auditory hallucinations and diagnosis of \"schizoaffective disorder\".  Denies SI and hallucinations at this time. Affect sad, anxious and irritable but identified mood as \"relieved\" due to being admitted.  Will sign LEONARD for  Kvng.    "

## 2018-11-20 NOTE — DISCHARGE INSTRUCTIONS
Behavioral Discharge Planning and Instructions  THANK YOU FOR CHOOSING THE [address]  [phone]    Summary: You were admitted to Station 3A on 11/19/2018 for detoxification from benzodiazepines.  A medical exam was performed that included lab work. You have met with a  and opted to ***.  Please take care and make your recovery a daily priority, Nay! It was a pleasure working with you and the entire treatment team here wishes you the very best in your recovery!     Recommendation:  ***    Main Diagnoses:  Per Dr. Love;  Sedative hypnotic use disorder  Schizoaffective bipolar type A  Transgender post-surgery post-orchiectomy.     Major Treatments, Procedures and Findings:  You were treated for benzodiazepine detoxification using phenobarbital. As an outpatient you will be prescribed ***, please take this medication as prescribed until it is gone. You *** a chemical dependency assessment. You had labs drawn and those results were reviewed with you. Please take a copy of your lab work with you to your next primary care physician appointment.    Symptoms to Report:  If you experience more anxiety, confusion, sleeplessness, deep sadness or thoughts of suicide, notify your treatment team or notify your primary care physician. IF ANY OF THE SYMPTOMS YOU ARE EXPERIENCING ARE A MEDICAL EMERGENCY CALL 911 IMMEDIATELY.     Lifestyle Adjustment: Adjust your lifestyle to get enough sleep, relaxation, exercise and good nutrition. Continue to develop healthy coping skills to decrease stress and promote a sober living environment. Do not use mood altering substances including alcohol, illegal drugs or addictive medications other than what is currently prescribed.     Disposition:     Follow-up Appointment:   Appointments date/time: Dr. Phoenix 12/03/18 @ 10:40AM                                          Dr. Morelos 12/06/18 @ 12:00 PM  Resources:   AA/NA and Sponsors are excellent  resources for support and you can find one at any support group meeting.   http://www.aastpaul.org (then click meetings) This for the [facility] AA meetings  http://a[place].org/meetings/   This is for the [facility] AA meetings  http://www.nam[place].us (then click find a  Meeting) This is for [facility] NA   SMART Recovery - self management for addiction recovery:  www.smartrecAptDecoy.org  Pathways ~ A Health Crisis Resource & Support Center:  291.213.1980.  http://www.harmreduction.org  [facility] 108-063-2812  Support Group:  AA/NA and Sponsor/support.  National [place] on Mental Illness (www.[place].monse.org): 781.247.9483 or 068-998-3611.  Alcoholics Anonymous (www.alcoholics-anonymous.org): Check your phone book for your local chapter.  Suicide Awareness Voices of Education (SAVE) (www.save.org): 824-871-BYBB (2083)  National Suicide Prevention Line (www.mentalhealthmn.org): 076-481-JYGP (1469)  Mental Health Consumer/Survivor Network of [place] (www.mhcsn.net): 826.149.5077 or 631-679-9742  Mental Health Association of [place] (www.mentalhealth.org): 792.127.9742 or 966-456-7577   Substance Abuse and Mental Health Services (www.samhsa.gov)    [place] Recovery Connection ([facility])  [facility] connects people seeking recovery to resources that help foster and sustain long-term recovery.  Whether you are seeking resources for treatment, transportation, housing, job training, education, health care or other pathways to recovery, [facility] is a great place to start.  150.976.6775.  www.[facility].org    General Medication Instructions:   See your medication sheet(s) for instructions.   Take all medications as prescribed.  Make no changes unless your doctor suggests them.   Go to all your doctor visits.  Be sure to have all your required lab tests. This way, your medicines can be refilled on time.  Do not use any forms of alcohol.    Please Note:  If you have any questions at anytime after you are  discharged please call the [facility], [place] detox unit 3AW unit at [phone].  [facility], Behavioral Intake [phone]  Medical Records call [phone]  Outpatient Behavioral Intake call [phone]  LP+ Wait List/Bed Availability call [phone]    Please take this discharge folder with you to all your follow up appointments, it contains your lab results, diagnosis, medication list and discharge recommendations.      THANK YOU FOR CHOOSING THE [facility]

## 2018-11-20 NOTE — PLAN OF CARE
Behavioral Team Discussion: (11/20/2018)    Continued Stay Criteria/Rationale: Patient admitted for benzodiazepine Use Disorder.  Plan: The following services will be provided to the patient; psychiatric assessment, medication management, therapeutic milieu, individual and group support, and skills groups.   Participants: 3A Provider: Dr. Rashmi Love MD; 3A RN's: Eleazar Juárez, RN; 3A CM's: Genoveva Mattson .  Summary/Recommendation: Providers will assess today for treatment recommendations, discharge planning, and aftercare plans. CM will meet with pt for discharge planning.   Medical/Physical: Deferred (see medical notes).  Precautions:   Behavioral Orders   Procedures     Code 1 - Restrict to Unit     Routine Programming     As clinically indicated     Status 15     Every 15 minutes.     Withdrawal precautions     Rationale for change in precautions or plan: N/A  Progress: No Change.

## 2018-11-20 NOTE — CONSULTS
[facility]  Internal Medicine Consult       [facility]  Brief Medicine Note  November 20, 2018    Circumstances of recent discharge and re-admittance noted. Please refer to recent medicine consult in charting dated 11/8/18, which was reviewed by this writer and is up to date. Please call the on-call PA-C for any f/u medical concerns if they arise.     Of note, there was concern about increased swelling to R groin region following recent orchiectomy. Met with patient who reports this has been like this for several days. Examined region and there is noticeable swelling near R groin, no redness/warmth or tenderness concerning for acute infection. Hemodynamically stable. Discussed case with Urology who will see patient today. Will defer further evaluation to urology.     Yakelin Keita  Internal Medicine [facility] Hospitalist  [phone]  November 20, 2018        Currently, medically stable and I will be happy to follow up and see again for any intercurrent medical issues. Thank you for the opportunity to be a part of this patient's care.      Yakelin Keita  Internal Medicine [facility] Hospitalist  [phone]  November 20, 2018

## 2018-11-20 NOTE — PROGRESS NOTES
Met with Pt to initiate discharge planning.  Pt is declining case management, assessment, and referral at this time.  Pt plans to discharge home.  Advised Pt to seek assistance if needed.  Pt acknowledged.

## 2018-11-21 PROCEDURE — 25000132 ZZH RX MED GY IP 250 OP 250 PS 637: Performed by: STUDENT IN AN ORGANIZED HEALTH CARE EDUCATION/TRAINING PROGRAM

## 2018-11-21 PROCEDURE — 12800012 ZZH R&B CD MH INTERMEDIATE ADULT

## 2018-11-21 PROCEDURE — 25000132 ZZH RX MED GY IP 250 OP 250 PS 637: Performed by: PSYCHIATRY & NEUROLOGY

## 2018-11-21 RX ORDER — IBUPROFEN 800 MG/1
800 TABLET, FILM COATED ORAL 3 TIMES DAILY
Status: DISCONTINUED | OUTPATIENT
Start: 2018-11-21 | End: 2018-11-26 | Stop reason: HOSPADM

## 2018-11-21 RX ADMIN — PHENOBARBITAL 97.2 MG: 64.8 TABLET ORAL at 20:07

## 2018-11-21 RX ADMIN — ACETAMINOPHEN 650 MG: 325 TABLET, FILM COATED ORAL at 18:17

## 2018-11-21 RX ADMIN — NICOTINE POLACRILEX 8 MG: 4 LOZENGE ORAL at 15:51

## 2018-11-21 RX ADMIN — NICOTINE POLACRILEX 8 MG: 4 LOZENGE ORAL at 08:50

## 2018-11-21 RX ADMIN — IBUPROFEN 800 MG: 800 TABLET ORAL at 20:07

## 2018-11-21 RX ADMIN — EMTRICITABINE AND TENOFOVIR DISOPROXIL FUMARATE 1 TABLET: 200; 300 TABLET, FILM COATED ORAL at 08:15

## 2018-11-21 RX ADMIN — NICOTINE POLACRILEX 8 MG: 4 LOZENGE ORAL at 18:59

## 2018-11-21 RX ADMIN — NICOTINE POLACRILEX 4 MG: 4 LOZENGE ORAL at 13:21

## 2018-11-21 RX ADMIN — NICOTINE POLACRILEX 8 MG: 4 LOZENGE ORAL at 12:19

## 2018-11-21 RX ADMIN — FLUOXETINE HYDROCHLORIDE 20 MG: 20 CAPSULE ORAL at 08:15

## 2018-11-21 RX ADMIN — ACETAMINOPHEN 650 MG: 325 TABLET, FILM COATED ORAL at 03:40

## 2018-11-21 RX ADMIN — NICOTINE POLACRILEX 8 MG: 4 LOZENGE ORAL at 17:47

## 2018-11-21 RX ADMIN — NICOTINE POLACRILEX 8 MG: 4 LOZENGE ORAL at 20:09

## 2018-11-21 RX ADMIN — PHENOBARBITAL 32.4 MG: 32.4 TABLET ORAL at 08:15

## 2018-11-21 RX ADMIN — IBUPROFEN 800 MG: 800 TABLET ORAL at 12:19

## 2018-11-21 RX ADMIN — IBUPROFEN 400 MG: 200 TABLET, FILM COATED ORAL at 06:32

## 2018-11-21 RX ADMIN — OLANZAPINE 10 MG: 10 TABLET, FILM COATED ORAL at 20:07

## 2018-11-21 ASSESSMENT — ACTIVITIES OF DAILY LIVING (ADL)
DRESS: INDEPENDENT
GROOMING: INDEPENDENT
GROOMING: INDEPENDENT
DRESS: INDEPENDENT
ORAL_HYGIENE: INDEPENDENT
ORAL_HYGIENE: INDEPENDENT

## 2018-11-21 NOTE — PROGRESS NOTES
Nursing Note: Patient reported being seen by urologist today regarding possible infection.  Stated she was told an antibiotic would be started today.  No note from urology and no order to start antibiotic.  On call MD/Dr Laurent was consulted and decision was made to consult with house officer.  House officer suggested calling the on call urologist. On call urologist (Dr Martin Briones at [phone]) stated he would attempt to contact urologist who may have seen patient but later called back to say he had no response to page. Recommended trying to contact medical person who also saw patient earlier.  This was done but there was no call back.  Patient is having mild discomfort but has decided to retire for the night and has been informed of above.  Will need to be seen regarding this in a.m.  Temperature at this time is WNL.

## 2018-11-22 PROCEDURE — 25000132 ZZH RX MED GY IP 250 OP 250 PS 637: Performed by: STUDENT IN AN ORGANIZED HEALTH CARE EDUCATION/TRAINING PROGRAM

## 2018-11-22 PROCEDURE — 25000132 ZZH RX MED GY IP 250 OP 250 PS 637: Performed by: PSYCHIATRY & NEUROLOGY

## 2018-11-22 PROCEDURE — 12800012 ZZH R&B CD MH INTERMEDIATE ADULT

## 2018-11-22 RX ADMIN — NICOTINE POLACRILEX 4 MG: 4 LOZENGE ORAL at 18:23

## 2018-11-22 RX ADMIN — IBUPROFEN 800 MG: 800 TABLET ORAL at 20:11

## 2018-11-22 RX ADMIN — NICOTINE POLACRILEX 8 MG: 4 LOZENGE ORAL at 20:11

## 2018-11-22 RX ADMIN — NICOTINE POLACRILEX 8 MG: 4 LOZENGE ORAL at 21:45

## 2018-11-22 RX ADMIN — PHENOBARBITAL 97.2 MG: 64.8 TABLET ORAL at 21:43

## 2018-11-22 RX ADMIN — IBUPROFEN 800 MG: 800 TABLET ORAL at 13:25

## 2018-11-22 RX ADMIN — NICOTINE POLACRILEX 8 MG: 4 LOZENGE ORAL at 12:14

## 2018-11-22 RX ADMIN — NICOTINE POLACRILEX 8 MG: 4 LOZENGE ORAL at 10:16

## 2018-11-22 RX ADMIN — NICOTINE POLACRILEX 8 MG: 4 LOZENGE ORAL at 16:20

## 2018-11-22 RX ADMIN — EMTRICITABINE AND TENOFOVIR DISOPROXIL FUMARATE 1 TABLET: 200; 300 TABLET, FILM COATED ORAL at 08:15

## 2018-11-22 RX ADMIN — IBUPROFEN 800 MG: 800 TABLET ORAL at 08:15

## 2018-11-22 RX ADMIN — PHENOBARBITAL 32.4 MG: 32.4 TABLET ORAL at 08:15

## 2018-11-22 RX ADMIN — FLUOXETINE HYDROCHLORIDE 20 MG: 20 CAPSULE ORAL at 08:15

## 2018-11-22 RX ADMIN — NICOTINE POLACRILEX 4 MG: 4 LOZENGE ORAL at 18:22

## 2018-11-22 RX ADMIN — NICOTINE POLACRILEX 8 MG: 4 LOZENGE ORAL at 14:50

## 2018-11-22 RX ADMIN — NICOTINE POLACRILEX 8 MG: 4 LOZENGE ORAL at 13:25

## 2018-11-22 RX ADMIN — NICOTINE POLACRILEX 8 MG: 4 LOZENGE ORAL at 08:16

## 2018-11-22 RX ADMIN — OLANZAPINE 10 MG: 10 TABLET, FILM COATED ORAL at 21:45

## 2018-11-22 ASSESSMENT — ACTIVITIES OF DAILY LIVING (ADL)
LAUNDRY: WITH SUPERVISION
DRESS: INDEPENDENT
GROOMING: INDEPENDENT
ORAL_HYGIENE: INDEPENDENT

## 2018-11-22 NOTE — PROGRESS NOTES
The patient has been in the milieu and has been walking up and down in the halls.  She denies any thoughts of self harm.  She states that her groin pain is feeling better.  She has used an ice pack once.  She plans to return home upon discharge from detox and states that she has support.

## 2018-11-23 PROCEDURE — 25000132 ZZH RX MED GY IP 250 OP 250 PS 637: Performed by: STUDENT IN AN ORGANIZED HEALTH CARE EDUCATION/TRAINING PROGRAM

## 2018-11-23 PROCEDURE — 25000132 ZZH RX MED GY IP 250 OP 250 PS 637: Performed by: PSYCHIATRY & NEUROLOGY

## 2018-11-23 PROCEDURE — 12800012 ZZH R&B CD MH INTERMEDIATE ADULT

## 2018-11-23 PROCEDURE — 99232 SBSQ HOSP IP/OBS MODERATE 35: CPT | Performed by: PSYCHIATRY & NEUROLOGY

## 2018-11-23 RX ORDER — HYDROXYZINE HYDROCHLORIDE 25 MG/1
25-50 TABLET, FILM COATED ORAL EVERY 4 HOURS PRN
Status: DISCONTINUED | OUTPATIENT
Start: 2018-11-23 | End: 2018-11-26 | Stop reason: HOSPADM

## 2018-11-23 RX ORDER — PHENOBARBITAL 32.4 MG/1
32.4 TABLET ORAL AT BEDTIME
Status: DISCONTINUED | OUTPATIENT
Start: 2018-11-25 | End: 2018-11-26 | Stop reason: HOSPADM

## 2018-11-23 RX ORDER — PHENOBARBITAL 64.8 MG/1
64.8 TABLET ORAL AT BEDTIME
Status: COMPLETED | OUTPATIENT
Start: 2018-11-24 | End: 2018-11-24

## 2018-11-23 RX ADMIN — NICOTINE POLACRILEX 8 MG: 4 LOZENGE ORAL at 19:11

## 2018-11-23 RX ADMIN — PHENOBARBITAL 97.2 MG: 64.8 TABLET ORAL at 21:34

## 2018-11-23 RX ADMIN — FLUOXETINE HYDROCHLORIDE 20 MG: 20 CAPSULE ORAL at 08:39

## 2018-11-23 RX ADMIN — IBUPROFEN 800 MG: 800 TABLET ORAL at 08:39

## 2018-11-23 RX ADMIN — HYDROXYZINE HYDROCHLORIDE 50 MG: 25 TABLET, FILM COATED ORAL at 15:11

## 2018-11-23 RX ADMIN — NICOTINE POLACRILEX 8 MG: 4 LOZENGE ORAL at 14:26

## 2018-11-23 RX ADMIN — NICOTINE POLACRILEX 4 MG: 4 LOZENGE ORAL at 20:29

## 2018-11-23 RX ADMIN — IBUPROFEN 800 MG: 800 TABLET ORAL at 13:25

## 2018-11-23 RX ADMIN — NICOTINE POLACRILEX 8 MG: 4 LOZENGE ORAL at 18:11

## 2018-11-23 RX ADMIN — NICOTINE POLACRILEX 8 MG: 4 LOZENGE ORAL at 15:49

## 2018-11-23 RX ADMIN — NICOTINE POLACRILEX 8 MG: 4 LOZENGE ORAL at 21:34

## 2018-11-23 RX ADMIN — IBUPROFEN 800 MG: 800 TABLET ORAL at 20:29

## 2018-11-23 RX ADMIN — NICOTINE POLACRILEX 8 MG: 4 LOZENGE ORAL at 10:16

## 2018-11-23 RX ADMIN — NICOTINE POLACRILEX 8 MG: 4 LOZENGE ORAL at 11:02

## 2018-11-23 RX ADMIN — OLANZAPINE 10 MG: 10 TABLET, FILM COATED ORAL at 21:34

## 2018-11-23 RX ADMIN — ACETAMINOPHEN 650 MG: 325 TABLET, FILM COATED ORAL at 16:19

## 2018-11-23 RX ADMIN — NICOTINE POLACRILEX 8 MG: 4 LOZENGE ORAL at 13:25

## 2018-11-23 RX ADMIN — EMTRICITABINE AND TENOFOVIR DISOPROXIL FUMARATE 1 TABLET: 200; 300 TABLET, FILM COATED ORAL at 08:39

## 2018-11-23 RX ADMIN — NICOTINE POLACRILEX 8 MG: 4 LOZENGE ORAL at 08:39

## 2018-11-23 RX ADMIN — NICOTINE POLACRILEX 8 MG: 4 LOZENGE ORAL at 17:02

## 2018-11-23 ASSESSMENT — ACTIVITIES OF DAILY LIVING (ADL)
DRESS: INDEPENDENT
ORAL_HYGIENE: INDEPENDENT
DRESS: INDEPENDENT
ORAL_HYGIENE: INDEPENDENT
GROOMING: INDEPENDENT
LAUNDRY: WITH SUPERVISION
GROOMING: INDEPENDENT

## 2018-11-23 NOTE — PROGRESS NOTES
The patient has been in the milieu.  She denies any thoughts of self harm.  She is hoping to go home after her Phenobarb taper.

## 2018-11-23 NOTE — PROGRESS NOTES
"[facility], [place]   Psychiatric Progress Note    Interim history   This is a 27 year old male to female transgendered patient with Medication Use Disorder, Schizoaffective bipolar type A transgender post surgery post orichiectomy. Pt seen in rounds. Patient's mood is \"good.\" Denies any depressive symptoms. Denies withdrawal symptoms though notes that she is feeling sedated on current dose of Valium. She also notes that she is hoping to be discharged on Monday, if possible. She plans to engage in AA meetings and will be in supportive, sober environment with her . She also has a service dog, who is a good source of support. She notes extended period of sobriety of two years from alcohol while engaged in AA. She has a sponsor who is aware she is in detox.   Energy Level:HIGH  Sleep:No concerns, sleeps well through night  Appetite:normal   Motivation: normal    Suicidal/homicidal ideation/plan intent. No symptoms of psychosis/solitario.  No prior suicide attempts  No access to gun  Pt is in detox.  Pt on withdrawal precautions.  Tolerating meds and has no side effects with exception of sedation.            Medications:     Current Facility-Administered Medications   Medication     acetaminophen (TYLENOL) tablet 650 mg     alum & mag hydroxide-simethicone (MYLANTA ES/MAALOX  ES) suspension 30 mL     bisacodyl (DULCOLAX) Suppository 10 mg     emtricitabine-tenofovir (TRUVADA) 200-300 MG per tablet 1 tablet     [START ON 11/25/2018] estradiol valerate (DELESTROGEN) injection 4 mg     FLUoxetine (PROzac) capsule 20 mg     ibuprofen (ADVIL/MOTRIN) tablet 800 mg     magnesium hydroxide (MILK OF MAGNESIA) suspension 30 mL     nicotine polacrilex lozenge 4-8 mg     OLANZapine (zyPREXA) tablet 10 mg     ondansetron (ZOFRAN) tablet 4 mg     PHENobarbital (LUMINAL) tablet 32.4 mg     PHENobarbital (LUMINAL) tablet 97.2 mg             Allergies:   No Known Allergies         Psychiatric Examination: " "  Blood pressure 102/69, pulse 70, temperature 97  F (36.1  C), temperature source Oral, resp. rate 16, height 1.803 m (5' 11\"), weight 63.5 kg (140 lb), SpO2 96 %.  Weight is 140 lbs 0 oz  Body mass index is 19.53 kg/(m^2).    Appearance:  awake, alert and adequately groomed  Attitude:  cooperative  Eye Contact:  good  Mood:  good  Affect:  appropriate and in normal range and mood congruent  Speech:  clear, coherent rate /rhythm are normal  Psychomotor Behavior:  no evidence of tardive dyskinesia, dystonia, or tics  Throught Process:  logical, linear and goal oriented  Associations:  no loose associations  Thought Content:  no evidence of suicidal ideation or homicidal ideation and no evidence of psychotic thought  Insight:  good  Judgement:  intact  Oriented to:  time, person, and place  Attention Span and Concentration:  intact  Recent and Remote Memory:  intact  Language fund of knowledge are adequate         Labs:   No results found for this or any previous visit (from the past 24 hour(s)).           DIAGNOSES:  Sedative hypnotics use disorder  Schizoaffective Disorder, bipolar type    Will discontinue AM Phenobarbital dose of 32.4 mg daily today due to absence of withdrawal symptoms and side effect of day-time sedation. Pt requesting taper at this time. Discussed care directly with Dr. Love this morning, who recommends tapering by 32.4 mg daily while monitoring closely for signs/sx of withdrawal:  11/23: 97.2 mg at bedtime  11/24: 64.8 mg at bedtime  11/25: 32.4 mg at bedtime    Laboratory/Imaging: reviewed with patient   Consults: internal medicine consult reviewed  Patient will be treated in therapeutic milieu with appropriate individual and group therapies as described.  Patient expresses desire to discharge on Monday and is not interested in intensive CD treatment. Would like to engage in AA meetings on daily basis to maintain sobriety.    Medical diagnoses to be addressed this admission:    Plan per " IM on 11/20:  Circumstances of recent discharge and re-admittance noted. Please refer to recent medicine consult in charting dated 11/8/18, which was reviewed by this writer and is up to date. Please call the on-call PA-C for any f/u medical concerns if they arise.      Of note, there was concern about increased swelling to R groin region following recent orchiectomy. Met with patient who reports this has been like this for several days. Examined region and there is noticeable swelling near R groin, no redness/warmth or tenderness concerning for acute infection. Hemodynamically stable. Discussed case with Urology who will see patient today. Will defer further evaluation to urology.      Yakelin Keita    Plan per Urology on 11/20:  A/P  28 yo transgender female 20 days post simple orchiectomy with right inguinal groin swelling and tenderness suggestive of post-op hematoma, less likely infection      - recommend conservative management with ice packing (TID to QID for 10-15 min), scheduled NSAID and Tylenol   - please contact urology for any change in exam findings including skin changes or worsening swelling   - otherwise will follow up in 1 week for symptom check and re-examination     Legal Status: voluntary    Safety Assessment:   Checks:  15 min  Precautions: withdrawal precautions  Pt has not required locked seclusion or restraints in the past 24 hours to maintain safety, please refer to RN documentation for further details.  Discussed with patient many issues of addiction,triggers, relapse, and establishing a solid recovery program.  Able to give informed consent:  YES   Discussed Risks/Benefits/Side Effects/Alternatives: YES    After discussion of the indications, risks, benefits, alternatives and consequences of no treatment, the patient elects to treatment

## 2018-11-24 PROCEDURE — 25000132 ZZH RX MED GY IP 250 OP 250 PS 637: Performed by: PSYCHIATRY & NEUROLOGY

## 2018-11-24 PROCEDURE — 25000132 ZZH RX MED GY IP 250 OP 250 PS 637: Performed by: STUDENT IN AN ORGANIZED HEALTH CARE EDUCATION/TRAINING PROGRAM

## 2018-11-24 PROCEDURE — 12800012 ZZH R&B CD MH INTERMEDIATE ADULT

## 2018-11-24 RX ADMIN — NICOTINE POLACRILEX 8 MG: 4 LOZENGE ORAL at 19:09

## 2018-11-24 RX ADMIN — HYDROXYZINE HYDROCHLORIDE 50 MG: 25 TABLET, FILM COATED ORAL at 18:07

## 2018-11-24 RX ADMIN — IBUPROFEN 800 MG: 800 TABLET ORAL at 20:17

## 2018-11-24 RX ADMIN — NICOTINE POLACRILEX 8 MG: 4 LOZENGE ORAL at 10:56

## 2018-11-24 RX ADMIN — NICOTINE POLACRILEX 8 MG: 4 LOZENGE ORAL at 21:54

## 2018-11-24 RX ADMIN — NICOTINE POLACRILEX 8 MG: 4 LOZENGE ORAL at 12:10

## 2018-11-24 RX ADMIN — IBUPROFEN 800 MG: 800 TABLET ORAL at 13:51

## 2018-11-24 RX ADMIN — NICOTINE POLACRILEX 8 MG: 4 LOZENGE ORAL at 16:17

## 2018-11-24 RX ADMIN — OLANZAPINE 10 MG: 10 TABLET, FILM COATED ORAL at 21:32

## 2018-11-24 RX ADMIN — IBUPROFEN 800 MG: 800 TABLET ORAL at 09:06

## 2018-11-24 RX ADMIN — FLUOXETINE HYDROCHLORIDE 20 MG: 20 CAPSULE ORAL at 09:06

## 2018-11-24 RX ADMIN — NICOTINE POLACRILEX 8 MG: 4 LOZENGE ORAL at 09:06

## 2018-11-24 RX ADMIN — NICOTINE POLACRILEX 8 MG: 4 LOZENGE ORAL at 15:01

## 2018-11-24 RX ADMIN — NICOTINE POLACRILEX 8 MG: 4 LOZENGE ORAL at 18:07

## 2018-11-24 RX ADMIN — EMTRICITABINE AND TENOFOVIR DISOPROXIL FUMARATE 1 TABLET: 200; 300 TABLET, FILM COATED ORAL at 09:06

## 2018-11-24 RX ADMIN — ACETAMINOPHEN 650 MG: 325 TABLET, FILM COATED ORAL at 12:52

## 2018-11-24 RX ADMIN — PHENOBARBITAL 64.8 MG: 64.8 TABLET ORAL at 21:32

## 2018-11-24 RX ADMIN — NICOTINE POLACRILEX 8 MG: 4 LOZENGE ORAL at 20:17

## 2018-11-24 RX ADMIN — HYDROXYZINE HYDROCHLORIDE 50 MG: 25 TABLET, FILM COATED ORAL at 10:54

## 2018-11-24 ASSESSMENT — ACTIVITIES OF DAILY LIVING (ADL)
DRESS: INDEPENDENT
GROOMING: INDEPENDENT
ORAL_HYGIENE: INDEPENDENT
LAUNDRY: WITH SUPERVISION

## 2018-11-24 NOTE — PROGRESS NOTES
The patient is scoring very low on her COWS.  She is attending programming.  She says that she is feeling well, but that she is bored.  She is hoping to leave for home soon.  Before lunch the patient became upset and stated that she wanted to leave.  She signed a 72 hour intent to leave.  Dr. Escobar was called and did not want to discharge the patient.  The patient later rescinded her request to leave.  She stated that she was upset with herself for being here.

## 2018-11-25 PROCEDURE — 12800012 ZZH R&B CD MH INTERMEDIATE ADULT

## 2018-11-25 PROCEDURE — 25000132 ZZH RX MED GY IP 250 OP 250 PS 637: Performed by: STUDENT IN AN ORGANIZED HEALTH CARE EDUCATION/TRAINING PROGRAM

## 2018-11-25 PROCEDURE — 25000131 ZZH RX MED GY IP 250 OP 636 PS 637: Performed by: PSYCHIATRY & NEUROLOGY

## 2018-11-25 PROCEDURE — 25000132 ZZH RX MED GY IP 250 OP 250 PS 637: Performed by: PSYCHIATRY & NEUROLOGY

## 2018-11-25 PROCEDURE — 25000128 H RX IP 250 OP 636: Performed by: PSYCHIATRY & NEUROLOGY

## 2018-11-25 RX ADMIN — NICOTINE POLACRILEX 8 MG: 4 LOZENGE ORAL at 11:17

## 2018-11-25 RX ADMIN — HYDROXYZINE HYDROCHLORIDE 50 MG: 25 TABLET, FILM COATED ORAL at 09:18

## 2018-11-25 RX ADMIN — IBUPROFEN 800 MG: 800 TABLET ORAL at 20:24

## 2018-11-25 RX ADMIN — NICOTINE POLACRILEX 8 MG: 4 LOZENGE ORAL at 09:18

## 2018-11-25 RX ADMIN — NICOTINE POLACRILEX 8 MG: 4 LOZENGE ORAL at 19:02

## 2018-11-25 RX ADMIN — EMTRICITABINE AND TENOFOVIR DISOPROXIL FUMARATE 1 TABLET: 200; 300 TABLET, FILM COATED ORAL at 08:09

## 2018-11-25 RX ADMIN — HYDROXYZINE HYDROCHLORIDE 50 MG: 25 TABLET, FILM COATED ORAL at 22:20

## 2018-11-25 RX ADMIN — NICOTINE POLACRILEX 8 MG: 4 LOZENGE ORAL at 16:16

## 2018-11-25 RX ADMIN — ONDANSETRON HYDROCHLORIDE 4 MG: 4 TABLET, FILM COATED ORAL at 10:50

## 2018-11-25 RX ADMIN — NICOTINE POLACRILEX 8 MG: 4 LOZENGE ORAL at 13:25

## 2018-11-25 RX ADMIN — PHENOBARBITAL 32.4 MG: 32.4 TABLET ORAL at 21:45

## 2018-11-25 RX ADMIN — FLUOXETINE HYDROCHLORIDE 20 MG: 20 CAPSULE ORAL at 08:08

## 2018-11-25 RX ADMIN — IBUPROFEN 800 MG: 800 TABLET ORAL at 13:25

## 2018-11-25 RX ADMIN — NICOTINE POLACRILEX 8 MG: 4 LOZENGE ORAL at 18:15

## 2018-11-25 RX ADMIN — IBUPROFEN 800 MG: 800 TABLET ORAL at 08:09

## 2018-11-25 RX ADMIN — NICOTINE POLACRILEX 8 MG: 4 LOZENGE ORAL at 08:09

## 2018-11-25 RX ADMIN — ESTRADIOL VALERATE 4 MG: 20 INJECTION INTRAMUSCULAR at 09:12

## 2018-11-25 RX ADMIN — NICOTINE POLACRILEX 8 MG: 4 LOZENGE ORAL at 15:23

## 2018-11-25 RX ADMIN — HYDROXYZINE HYDROCHLORIDE 50 MG: 25 TABLET, FILM COATED ORAL at 12:07

## 2018-11-25 RX ADMIN — NICOTINE POLACRILEX 8 MG: 4 LOZENGE ORAL at 21:45

## 2018-11-25 RX ADMIN — NICOTINE POLACRILEX 8 MG: 4 LOZENGE ORAL at 20:24

## 2018-11-25 RX ADMIN — OLANZAPINE 10 MG: 10 TABLET, FILM COATED ORAL at 21:45

## 2018-11-25 RX ADMIN — NICOTINE POLACRILEX 8 MG: 4 LOZENGE ORAL at 12:18

## 2018-11-25 ASSESSMENT — ACTIVITIES OF DAILY LIVING (ADL)
LAUNDRY: WITH SUPERVISION
GROOMING: INDEPENDENT
DRESS: INDEPENDENT
ORAL_HYGIENE: INDEPENDENT

## 2018-11-25 NOTE — PLAN OF CARE
Problem: Substance Withdrawal  Goal: Substance Withdrawal  Signs and symptoms of listed problems will be absent or manageable.   The patient has been in much better spirits this shift.  She has exhibited a range of affect and she has been social with peers.  She denies any thoughts of self harm and is looking forward to having visitors today and to hopefully being discharged tomorrow.  She has set up a ride with her uncle.She continues on the Phenobarb taper.  After lunch the patient came to the desk and said that she thought she was going through withdrawal.  Her MSSA was a 5 and her VS were well within normal limits.

## 2018-11-26 VITALS
RESPIRATION RATE: 16 BRPM | SYSTOLIC BLOOD PRESSURE: 123 MMHG | BODY MASS INDEX: 19.6 KG/M2 | TEMPERATURE: 97.5 F | HEIGHT: 71 IN | HEART RATE: 75 BPM | WEIGHT: 140 LBS | DIASTOLIC BLOOD PRESSURE: 83 MMHG | OXYGEN SATURATION: 96 %

## 2018-11-26 PROCEDURE — 25000132 ZZH RX MED GY IP 250 OP 250 PS 637: Performed by: PSYCHIATRY & NEUROLOGY

## 2018-11-26 PROCEDURE — 99239 HOSP IP/OBS DSCHRG MGMT >30: CPT | Performed by: PSYCHIATRY & NEUROLOGY

## 2018-11-26 PROCEDURE — 25000132 ZZH RX MED GY IP 250 OP 250 PS 637: Performed by: STUDENT IN AN ORGANIZED HEALTH CARE EDUCATION/TRAINING PROGRAM

## 2018-11-26 RX ADMIN — NICOTINE POLACRILEX 8 MG: 4 LOZENGE ORAL at 10:33

## 2018-11-26 RX ADMIN — IBUPROFEN 800 MG: 800 TABLET ORAL at 08:45

## 2018-11-26 RX ADMIN — NICOTINE POLACRILEX 8 MG: 4 LOZENGE ORAL at 08:45

## 2018-11-26 RX ADMIN — FLUOXETINE HYDROCHLORIDE 20 MG: 20 CAPSULE ORAL at 08:45

## 2018-11-26 RX ADMIN — EMTRICITABINE AND TENOFOVIR DISOPROXIL FUMARATE 1 TABLET: 200; 300 TABLET, FILM COATED ORAL at 08:45

## 2018-11-26 ASSESSMENT — ACTIVITIES OF DAILY LIVING (ADL)
DRESS: INDEPENDENT
ORAL_HYGIENE: INDEPENDENT
GROOMING: INDEPENDENT
LAUNDRY: WITH SUPERVISION

## 2018-11-26 NOTE — PROGRESS NOTES
"Asking for ice packs several times this shift.  Expressing much anxiety regarding right sided groin area.  Worried that area is \"hard\" compared to left side.  States she's concerned there may be an infection. Vitals are within normal limits.  Reported occasional stabbing pain and ongoing discomfort. Using ice packs and scheduled Ibuprofen.  "

## 2018-11-26 NOTE — PROGRESS NOTES
Patient did not want discharge medications (vitamins and nicotine replacements). Attending psychiatrist notified.

## 2018-11-26 NOTE — DISCHARGE SUMMARY
Admit Date:     11/19/2018   Discharge Date:  11/26         More than 35 minutes spent on this summary, doing the discharge instructions, discharge medications, discharge mental status examination.      DISCHARGE DIAGNOSES:   Axis I:  Sedative hypnotics use disorder, schizoaffective type, transgender, status post orchiectomy.      HOSPITAL COURSE:  During the hospitalization, the patient was detoxed off sedative hypnotics using phenobarbital.  The patient had had an uneventful detox.  During the hospitalization, the patient was seen on 11/8 for Internal Medicine consult.  The patient was reporting pain and was asked to continue with Tylenol.  Bossman Hamlin also saw the patient on 11/20, increased swelling and was seen by Urology.  Urology was seen and they found that the less likely infection and they recommended conservative management with ice packing and continue with Tylenol.  During the hospitalization, the patient started the taper, energy, motivation, sleep and interest improved.      DISCHARGE DISPOSITION:  The patient going home.      DISCHARGE MENTAL STATUS EXAMINATION:  The patient is alert, oriented x3.  Recent and remote memory, language, fund adequate.  No loose associations.  The patient does not have any suicidal or homicidal ideation, plan or intent.        The patient will continue Lexapro, Truvada, Multivitamins, Prozac 20 mg, olanzapine 10 mg.      The patient is stable to be discharged.               DISCHARGE MENTAL STATUS EXAMINATION:  The patient is alert, oriented x3.  Good fund of knowledge.  Good use of language.  Recent and remote memory, language, fund of knowledge are all adequate.  Euthymic mood congruent affect  Speech normal rate/rhythm linear tp no loose asso,The patient does not have any active suicidal or homicidal ideation.  Does not have any auditory or visual hallucination.  Fair insight/judgment At this time, the patient was stable to be discharged.        Pt was not  determined to not be a danger to himself or others. At the current time of discharge, the patient does not meet criteria for involuntary hospitalization. On the day of discharge, the patient reports that they do not have suicidal or homicidal ideation and would never hurt themselves or others. Steps taken to minimize risk include: assessing patient s behavior and thought process daily during hospital stay, discharging patient with adequate plan for follow up for mental and physical health and discussing safety plan of returning to the hospital should the patient ever have thoughts of harming themselves or others. Therefore, based on all available evidence including the factors cited above, the patient does not appear to be at imminent risk for self-harm, and is appropriate for outpatient level of care.     Educated about side effects/risk vs benefits /alternative including non treatment.Pt consented to be on medication.     .Total time spent on discharge summary more than 35 min  More than  20 min  planning, coordination of care, medication reconciliation and performance of physical exam on day of discharge.Care was coordinated with unit RN and unit therapist       Nay Lainez   Home Medication Instructions ROC:[id number]    Printed on:11/27/18 1113   Medication Information                      emtricitabine-tenofovir (TRUVADA) 200-300 MG per tablet  Take 1 tablet by mouth daily              estradiol valerate (DELESTROGEN) 20 MG/ML injection  Inject 4 mg into the muscle every 7 days Takes on Sundays; INJECT 0.2 ML IM ONCE A WEEK             FLUoxetine (PROZAC) 20 MG capsule  Take 20 mg by mouth every morning              nicotine polacrilex 4 MG lozenge  Place 1-2 lozenges (4-8 mg) inside cheek every hour as needed for other (nicotine withdrawal symptoms)             OLANZapine (ZYPREXA) 10 MG tablet  Take 1 tablet (10 mg) by mouth At Bedtime                     DISCHARGE MENTAL STATUS EXAMINATION:  The  patient is alert, oriented x3.  Good fund of knowledge.  Good use of language.  Recent and remote memory, language, fund of knowledge are all adequate.  Euthymic mood congruent affect  Speech normal rate/rhythm linear tp no loose asso,The patient does not have any active suicidal or homicidal ideation.  Does not have any auditory or visual hallucination.  Fair insight/judgment At this time, the patient was stable to be discharged.        Pt was not determined to not be a danger to himself or others. At the current time of discharge, the patient does not meet criteria for involuntary hospitalization. On the day of discharge, the patient reports that they do not have suicidal or homicidal ideation and would never hurt themselves or others. Steps taken to minimize risk include: assessing patient s behavior and thought process daily during hospital stay, discharging patient with adequate plan for follow up for mental and physical health and discussing safety plan of returning to the hospital should the patient ever have thoughts of harming themselves or others. Therefore, based on all available evidence including the factors cited above, the patient does not appear to be at imminent risk for self-harm, and is appropriate for outpatient level of care.     Educated about side effects/risk vs benefits /alternative including non treatment.Pt consented to be on medication.     .Total time spent on discharge summary more than 35 min  More than  20 min  planning, coordination of care, medication reconciliation and performance of physical exam on day of discharge.Care was coordinated with unit RN and unit therapist       Nay Lainez   Home Medication Instructions ROC:[id number]    Printed on:11/26/18 1141   Medication Information                      emtricitabine-tenofovir (TRUVADA) 200-300 MG per tablet  Take 1 tablet by mouth daily              estradiol valerate (DELESTROGEN) 20 MG/ML injection  Inject 4 mg into the  muscle every 7 days Takes on Sundays; INJECT 0.2 ML IM ONCE A WEEK             FLUoxetine (PROZAC) 20 MG capsule  Take 20 mg by mouth every morning              nicotine polacrilex 4 MG lozenge  Place 1-2 lozenges (4-8 mg) inside cheek every hour as needed for other (nicotine withdrawal symptoms)             OLANZapine (ZYPREXA) 10 MG tablet  Take 1 tablet (10 mg) by mouth At Bedtime                Follow-up Appointment:   Appointments date/time: Dr. Phoenix 12/03/18 @ 10:40AM                                          Dr. Morelos     Follow-up Appointment:   Appointments date/time: Dr. Phoenix 12/03/18 @ 10:40AM                                          Dr. Morelos 12/06/18 @ 12:00 PM  Resources:   AA/NA and Sponsors are excellent resources for support and you can find one at any support group meeting.   http://www.aastpaul.org (then click meetings) This for the [facility] AA meetings  http://aamin[place].org/meetings/   This is for the [facility] [place] [facility] AA meetings  http://www.nam[place]. (then click find a  Meeting) This is for [facility] NA   SMART Recovery - self management for addiction recovery:  www.smartrecovery.org  Pathways ~ A Health Crisis Resource & Support Center:  423.347.4359.  http://www.harmreduction.org  [facility] 409-845-1440  Support Group:  AA/NA and Sponsor/support.  National [place] on Mental Illness (www.[place].monse.org): 670.489.9178 or 045-597-0602.  Alcoholics Anonymous (www.alcoholics-anonymous.org): Check your phone book for your local chapter.  Suicide Awareness Voices of Education (SAVE) (www.save.org): 190-670-KTKV (5315)  National Suicide Prevention Line (www.mentalhealthmn.org): 414-387-RLRJ (6824)  Mental Health Consumer/Survivor Network of [place] (www.mhcsn.net): 601.133.8928 or 312-990-1505  Mental Health Association of [place] (www.mentalhealth.org): 154.693.9546 or 349-332-6845   Substance Abuse and Mental Health Services (www.samhsa.gov)     [facility]  Connection ([facility])  [facility] connects people seeking recovery to resources that help foster and sustain long-term recovery.  Whether you are seeking resources for treatment, transportation, housing, job training, education, health care or other pathways to recovery, [facility] is a great place to start.  865.850.7575.  www.[email or web address].HangIt     PRINCESS DAVILA MD             D: 2018   T: 2018   MT: ROLY      Name:     ROSY DIEHL   MRN:      [id number]        Account:        [id number]   :      1990           Admit Date:     2018                                  Discharge Date:       Document: [id number]

## 2018-11-26 NOTE — PLAN OF CARE
Problem: Substance Withdrawal  Goal: Substance Withdrawal  Signs and symptoms of listed problems will be absent or manageable.      GOALS/OUTCOMES:   By discharge/transition of care:     Patient will demonstrate outcomes below:   Physical safety   Strength-based wellness/recovery   Identify reason(s) for hospitalization from their perspective.  Identify a goal for discharge.  Identify triggers that may increase their risk for relapse.  Identify coping skills they can do to stay well.    Identify their support system to demonstrate readiness for discharge.    Illnesses Management & Recovery  Patient identified  as trigger for relapse.  Patient identified  as a general wellness strategy.  Patient identified  as a warning sign that they are in danger of relapse.  Patient identified  as someone they cont on to get feedback from.  Patient identified  as a way to take action when in danger of relapse.  Patient identified  as a way to cope with stress or other symptoms.   Outcome: Adequate for Discharge Date Met: 11/26/18  Pt verbalized satisfaction with plan to discharge this shift. Discharge teaching and medication education provided. Pt verbalizes understanding. AVS signed. Patient denies SI and SIB. Safety plan in place.  Plans to follow-up with scheduled outpatient providers after discharge. Pt left unit with discharge medications and belongings. Transported home by uncle.

## 2018-11-28 ENCOUNTER — OFFICE VISIT (OUTPATIENT)
Dept: UROLOGY | Facility: CLINIC | Age: 28
End: 2018-11-28
Payer: COMMERCIAL

## 2018-11-28 VITALS
HEART RATE: 82 BPM | HEIGHT: 71 IN | DIASTOLIC BLOOD PRESSURE: 67 MMHG | BODY MASS INDEX: 19.6 KG/M2 | WEIGHT: 140 LBS | SYSTOLIC BLOOD PRESSURE: 115 MMHG

## 2018-11-28 DIAGNOSIS — F64.9 GENDER DYSPHORIA: Primary | ICD-10-CM

## 2018-11-28 ASSESSMENT — PAIN SCALES - GENERAL: PAINLEVEL: MODERATE PAIN (4)

## 2018-11-28 NOTE — MR AVS SNAPSHOT
"              After Visit Summary   11/28/2018    Nay Lainez    MRN: [id number]           Patient Information     Date Of Birth          1990        Visit Information        Provider Department      11/28/2018 1:00 PM Javid Kendall MD [facility] and [facility]        Today's Diagnoses     Gender dysphoria    -  1       Follow-ups after your visit        Who to contact     Please call your clinic at [phone] to:    Ask questions about your health    Make or cancel appointments    Discuss your medicines    Learn about your test results    Speak to your doctor            Additional Information About Your Visit        Care EveryWhere ID     This is your Care EveryWhere ID. This could be used by other organizations to access your [facility] medical records  [id number]        Your Vitals Were     Pulse Height BMI (Body Mass Index)             82 1.803 m (5' 11\") 19.53 kg/m2          Blood Pressure from Last 3 Encounters:   11/28/18 115/67   11/09/18 105/60   11/02/18 99/68    Weight from Last 3 Encounters:   11/28/18 63.5 kg (140 lb)   11/08/18 61.4 kg (135 lb 6.4 oz)   10/16/18 64.1 kg (141 lb 4.8 oz)              Today, you had the following     No orders found for display       Primary Care Provider Office Phone # Fax #    Ventura Morelos [phone]       [facility] [place] [address] [place] [address]        Equal Access to Services     ELIESER CASILLAS : Hadii kimberly ku hadasho Soomaali, waaxda luqadaha, qaybta kaalmada adeegyada, denver mcdonald . So [facility] [phone].    ATENCIÓN: Si habla español, tiene a knight disposición servicios gratuitos de asistencia lingüística. Llame al [phone].    We comply with applicable federal civil rights laws and [place] laws. We do not discriminate on the basis of race, color, national origin, age, disability, sex, sexual orientation, or gender identity.            Thank " you!     Thank you for choosing [facility] UROLOGY AND [facility] FOR PROSTATE [facility] UROLOGIC CANCERS  for your care. Our goal is always to provide you with excellent care. Hearing back from our patients is one way we can continue to improve our services. Please take a few minutes to complete the written survey that you may receive in the mail after your visit with us. Thank you!             Your Updated Medication List - Protect others around you: Learn how to safely use, store and throw away your medicines at www.disposemymeds.org.          This list is accurate as of 11/28/18  1:50 PM.  Always use your most recent med list.                   Brand Name Dispense Instructions for use Diagnosis    estradiol valerate 20 MG/ML injection    DELESTROGEN     Inject 4 mg into the muscle every 7 days Takes on Sundays; INJECT 0.2 ML IM ONCE A WEEK        nicotine 4 MG lozenge    NICORETTE    360 tablet    Place 1-2 lozenges (4-8 mg) inside cheek every hour as needed for other (nicotine withdrawal symptoms)    Benzodiazepine dependence (H)       OLANZapine 10 MG tablet    zyPREXA    30 tablet    Take 1 tablet (10 mg) by mouth At Bedtime    Schizoaffective disorder, bipolar type (H)       PROZAC 20 MG capsule   Generic drug:  FLUoxetine      Take 20 mg by mouth every morning        TRUVADA 200-300 MG per tablet   Generic drug:  emtricitabine-tenofovir      Take 1 tablet by mouth daily

## 2018-11-28 NOTE — NURSING NOTE
"Chief Complaint   Patient presents with     RECHECK     two week post op       Blood pressure 115/67, pulse 82, height 1.803 m (5' 11\"), weight 63.5 kg (140 lb). Body mass index is 19.53 kg/(m^2).    Patient Active Problem List   Diagnosis     Homicidal ideations     Suicidal ideation     Homicidal ideation     Schizoaffective disorder (H)     Suicidal ideations     Benzodiazepine withdrawal (H)       No Known Allergies    Current Outpatient Prescriptions   Medication Sig Dispense Refill     emtricitabine-tenofovir (TRUVADA) 200-300 MG per tablet Take 1 tablet by mouth daily        estradiol valerate (DELESTROGEN) 20 MG/ML injection Inject 4 mg into the muscle every 7 days Takes on Sundays; INJECT 0.2 ML IM ONCE A WEEK       FLUoxetine (PROZAC) 20 MG capsule Take 20 mg by mouth every morning        nicotine polacrilex 4 MG lozenge Place 1-2 lozenges (4-8 mg) inside cheek every hour as needed for other (nicotine withdrawal symptoms) 360 tablet 0     OLANZapine (ZYPREXA) 10 MG tablet Take 1 tablet (10 mg) by mouth At Bedtime 30 tablet 0       Social History   Substance Use Topics     Smoking status: Current Every Day Smoker     Packs/day: 2.00     Types: Cigarettes     Smokeless tobacco: Never Used     Alcohol use No      Comment: had 4 shots of vodak today       KOJO Eid  11/28/2018  12:46 PM       "

## 2018-11-28 NOTE — PROGRESS NOTES
Urology Postop Note    Nay Lainez is a 27 year old transgender female who is 3 weeks s/p bilateral orchiectomy.  Pathology was negative.  She has had pain since a day or two after surgery.  There is some subinguinal fullness bilaterally R > L.  This has been persistently painful after surgery.  She is taking ibuprofen for the discomfort and using ice packs.  She recently went through inpatient rehab for [facility] detox.    Exam:  Incision clean, dry, intact in midline scrotum.  R>L subinguinal fullness. Tender to palpation. No bruising.    A/P   Postoperative hematoma after bilateral simple orchiectomy. Hematoma is limited to subinguinal region and does not track into scrotum.    I recommended continued conservative management. Continue ice pack and NSAID.    Discussed vaginoplasty in the future. She is still considering if she wants to do it. She wants to full recovery before having a larger surgery like that. I told her to contact Dr. Díaz's office when she wants to pursue vaginoplasty.    Javid Kendall MD  Reconstructive Urology

## 2018-11-28 NOTE — LETTER
11/28/2018       RE: Nay Lainez  [address]  [facility] [address]     Dear Colleague,    Thank you for referring your patient, Nay Lainez, to the [facility] UROLOGY AND INST FOR PROSTATE AND UROLOGIC CANCERS at [facility]. Please see a copy of my visit note below.    Urology Postop Note    Nay Lainez is a 27 year old transgender female who is 3 weeks s/p bilateral orchiectomy.  Pathology was negative.  She has had pain since a day or two after surgery.  There is some subinguinal fullness bilaterally R > L.  This has been persistently painful after surgery.  She is taking ibuprofen for the discomfort and using ice packs.  She recently went through inpatient rehab for Benzo detox.    Exam:  Incision clean, dry, intact in midline scrotum.  R>L subinguinal fullness. Tender to palpation. No bruising.    A/P   Postoperative hematoma after bilateral simple orchiectomy. Hematoma is limited to subinguinal region and does not track into scrotum.    I recommended continued conservative management. Continue ice pack and NSAID.    Discussed vaginoplasty in the future. She is still considering if she wants to do it. She wants to full recovery before having a larger surgery like that. I told her to contact Dr. Díaz's office when she wants to pursue vaginoplasty.    Javid Kendall MD  Reconstructive Urology

## 2018-12-07 ENCOUNTER — TRANSFERRED RECORDS (OUTPATIENT)
Dept: HEALTH INFORMATION MANAGEMENT | Facility: CLINIC | Age: 28
End: 2018-12-07

## 2019-03-20 ENCOUNTER — PATIENT OUTREACH (OUTPATIENT)
Dept: PLASTIC SURGERY | Facility: CLINIC | Age: 29
End: 2019-03-20

## 2019-03-20 NOTE — PROGRESS NOTES
"Pt was contacted and referred to Dr. Polo Zhong for tracheal shave. Nay states that private practice want upfront payment \"and that won't work\".        "

## 2019-08-28 ENCOUNTER — PATIENT OUTREACH (OUTPATIENT)
Dept: PLASTIC SURGERY | Facility: CLINIC | Age: 29
End: 2019-08-28

## 2019-08-28 ENCOUNTER — MEDICAL CORRESPONDENCE (OUTPATIENT)
Dept: HEALTH INFORMATION MANAGEMENT | Facility: CLINIC | Age: 29
End: 2019-08-28

## 2019-08-29 ENCOUNTER — MEDICAL CORRESPONDENCE (OUTPATIENT)
Dept: HEALTH INFORMATION MANAGEMENT | Facility: CLINIC | Age: 29
End: 2019-08-29

## 2019-08-29 NOTE — PROGRESS NOTES
[facility]:  Care Coordination Note     SITUATION   Patient (Catina Tee,Her) is a 28 year old who is receiving support for:  Clinic Care Coordination - Follow-up (Pt wanting to reestablish care)  .    BACKGROUND     Pt called wanting to re-establish care with DR. Díaz to move forward with vaginoplasty.     Pt submitted letter of support from Hormone provider, Dr. Almaraz. Pt is working on obtaining 2 new updated letters of support, but indicated neither are her ongoing therapist.     ASSESSMENT     Surgery              CGC Assessment  [facility] [place] [place]: [place]  Patient has a therapist: No(No ongiong therapist)  Letter of support #1: Requested  Letter of support #2: Requested  Surgery being considered: Yes  Vaginoplasty: Yes  Orchiectomy: Yes  Orchiectomy Date: 11/02/18          PLAN          Nursing Interventions:  Scheduled pt for return consult.     Follow-up plan:  Pt at attend consult scheduled for 12/3/19 at 5:45pm.        Genaro Fallon

## 2019-08-30 ENCOUNTER — MEDICAL CORRESPONDENCE (OUTPATIENT)
Dept: HEALTH INFORMATION MANAGEMENT | Facility: CLINIC | Age: 29
End: 2019-08-30

## 2019-09-06 ENCOUNTER — TELEPHONE (OUTPATIENT)
Dept: PLASTIC SURGERY | Facility: CLINIC | Age: 29
End: 2019-09-06

## 2019-09-06 NOTE — TELEPHONE ENCOUNTER
Received patient authorization for release of protected health information from [facility] and [facility] for us to communicate regarding patient. Also received LOS from Leonarda Amato PsyD, LP, [facility]. Scanned into patient chart this day.  Keli Pelayo LPN  [facility]

## 2019-09-13 ENCOUNTER — PATIENT OUTREACH (OUTPATIENT)
Dept: PLASTIC SURGERY | Facility: CLINIC | Age: 29
End: 2019-09-13

## 2019-09-13 NOTE — PATIENT INSTRUCTIONS
Spoke with pt regarding 2 LOS. Explained that we have received both LOS. However, we would wait to initiate a PA until pts follow up with Dr. Díaz as it has been more than a year since her consult. Pt states understanding and denies any additional questions or concerns at this time. Sintia CHAVEZ RNCC

## 2019-12-03 ENCOUNTER — OFFICE VISIT (OUTPATIENT)
Dept: PLASTIC SURGERY | Facility: CLINIC | Age: 29
End: 2019-12-03
Payer: COMMERCIAL

## 2019-12-03 VITALS
DIASTOLIC BLOOD PRESSURE: 79 MMHG | HEART RATE: 84 BPM | SYSTOLIC BLOOD PRESSURE: 132 MMHG | HEIGHT: 71 IN | OXYGEN SATURATION: 95 % | BODY MASS INDEX: 19.53 KG/M2

## 2019-12-03 DIAGNOSIS — Z71.6 ENCOUNTER FOR SMOKING CESSATION COUNSELING: ICD-10-CM

## 2019-12-03 DIAGNOSIS — F64.0 GENDER DYSPHORIA IN ADULT: Primary | ICD-10-CM

## 2019-12-03 ASSESSMENT — PAIN SCALES - GENERAL: PAINLEVEL: NO PAIN (0)

## 2019-12-03 NOTE — LETTER
"12/3/2019       RE: Nay Lainez  [address]  [facility] [address]     Dear Colleague,    Thank you for referring your patient, Nay Lainez, to the [facility] PLASTIC AND RECONSTRUCTIVE SURGERY at [facility]. Please see a copy of my visit note below.    Patient returns for a follow-up visit to further discuss vaginoplasty for gender dysphoria.    INTERVAL HISTORY: Patient has undergone orchiectomy since we last saw each other.  She reports she is doing well and the orchiectomy has helped her tremendously.  However, she has now confirmed her desire to undergo vaginoplasty.  She has thought about this for quite some time now, and would like to undergo minimal depth vaginoplasty.  Her reasoning being that she is not interested in vaginal penetrative intercourse and would like to continue performing anal intercourse.  In addition, she is not willing to assume the risks of possible rectal injury and rectovaginal fistula that comes with dissection of the vaginal cavity.  She is not willing to perform lifelong dilation either.  She reports that the most important aspect of the vaginal plasty would be the external feminine appearance.  She would like a clitoris that is large in size.    PHYSICAL EXAMINATION:  /79 (BP Location: Left arm, Patient Position: Chair, Cuff Size: Adult Regular)   Pulse 84   Ht 1.803 m (5' 11\")   SpO2 95%   BMI 19.53 kg/m     Genital examination was performed the presence of chaperone.  Since her last visit together, the presence of her piercing has been removed.  Glans is still viable and sensate to light touch.  Penile shaft length measures 6 cm from the base.  No testicles are palpable in the scrotum.  Scrotum is small in size.  Genitalia is shaved.    ASSESSMENT: Gender dysphoria, requesting minimal depth vaginoplasty.    PLAN: I explained the need for 2 letters of support, both from mental health professionals.  Patient has " provided us with these.  In accordance with ATH Standards Of Care guidelines, patient is a potential candidate for minimal depth penile inversion vaginoplasty as an inpatient at the [facility] with a postoperative admission for pain control and monitoring purposes.  The operation is performed in conjunction with Dr. Javid Kendall given the need for surgical expertise from 2 separate subspecialties.  I explained the vaginoplasty procedure in detail today, which include a short neovaginal cavity dissection, grafting of neovaginal cavity, vaginal colpopexy, clitoroplasty, urethroplasty, perineal flap, orchiectomy, penectomy, scrotectomy, and bilateral labiaplasty.  Patient and I discussed the risks involved to include bleeding, infection, injury to surrounding structures, fluid collection, wound dehiscence with prolonged dressing changes, asymmetry, contour deformity, DVT, PE, clitoral necrosis, sensory loss, voiding issues, urinary stream abnormalities, flap loss, granulation tissue, chronic pain, incompletely feminized external vulva, and need for revision surgery.  Patient accepts these risks and wishes to work towards obtaining surgery.  I also explained to her that with this surgical technique, we are relying on anterior blood supply to heal the surgical site, preventing me from performing anterior labiaplasty.  And therefore she may require a second stage feminizing labiaplasty to fully feminize the external vulva.  She understood this.  Patient will be instructed to discontinue hormone therapy 2 weeks prior to surgery and resume it 2 weeks postoperatively to control its risk of DVT.  Patient will need to obtain a clear urine cotinine test.  Patient will perform a bowel prep 1 to 2 days prior to surgery.    Total time spent with patient was 45 min of which greater than 50% was in counseling.    Again, thank you for allowing me to participate in the care of your patient.       Sincerely,    João Díaz MD

## 2019-12-04 ENCOUNTER — PATIENT OUTREACH (OUTPATIENT)
Dept: PLASTIC SURGERY | Facility: CLINIC | Age: 29
End: 2019-12-04

## 2019-12-04 NOTE — NURSING NOTE
"Chief Complaint   Patient presents with     RECHECK     pt here to discuss vagnioplasty again       Vitals:    12/03/19 1820   BP: 132/79   BP Location: Left arm   Patient Position: Chair   Cuff Size: Adult Regular   Pulse: 84   SpO2: 95%   Height: 1.803 m (5' 11\")       Body mass index is 19.53 kg/m .    Tae Erickson, EMT    "

## 2019-12-04 NOTE — PROGRESS NOTES
[facility]:  Care Coordination Note     SITUATION   Patient is a 28 year old who is receiving support for:  Clinic Care Coordination - Follow-up (chart review for surgery readiness)  .    BACKGROUND     Pt saw Dr. Díaz on 12/3/19 to move forward with vaginoplasty.     Letters of support are adequate for PA.     ASSESSMENT     Surgery              CGC Assessment  Comprehensive Gender Care (CGC) Enrollment: Enrolled  Patient has a therapist: Yes  Name of therapist: Leonarda Banegas at [facility]  Letter of support #1: Received(Leonarda Banegas)  Letter #1 Date: 08/30/19  Letter of support #2: Received(Hernando Hong)  Letter #2 Date: 08/29/19  Surgery being considered: Yes  Vaginoplasty: Yes  Orchiectomy: Yes(completed 10/7/19 - Dr. Kendall)          PLAN          Nursing Interventions:  Reviewed letter of support for WPATH standards of care which is adequate.      Follow-up plan:  See Dr. Díaz's note for follow up plan.        Genaro Fallon

## 2019-12-05 NOTE — PROGRESS NOTES
"Patient returns for a follow-up visit to further discuss vaginoplasty for gender dysphoria.    INTERVAL HISTORY: Patient has undergone orchiectomy since we last saw each other.  She reports she is doing well and the orchiectomy has helped her tremendously.  However, she has now confirmed her desire to undergo vaginoplasty.  She has thought about this for quite some time now, and would like to undergo minimal depth vaginoplasty.  Her reasoning being that she is not interested in vaginal penetrative intercourse and would like to continue performing anal intercourse.  In addition, she is not willing to assume the risks of possible rectal injury and rectovaginal fistula that comes with dissection of the vaginal cavity.  She is not willing to perform lifelong dilation either.  She reports that the most important aspect of the vaginal plasty would be the external feminine appearance.  She would like a clitoris that is large in size.    PHYSICAL EXAMINATION:  /79 (BP Location: Left arm, Patient Position: Chair, Cuff Size: Adult Regular)   Pulse 84   Ht 1.803 m (5' 11\")   SpO2 95%   BMI 19.53 kg/m    Genital examination was performed the presence of chaperone.  Since her last visit together, the presence of her piercing has been removed.  Glans is still viable and sensate to light touch.  Penile shaft length measures 6 cm from the base.  No testicles are palpable in the scrotum.  Scrotum is small in size.  Genitalia is shaved.    ASSESSMENT: Gender dysphoria, requesting minimal depth vaginoplasty.    PLAN: I explained the need for 2 letters of support, both from mental health professionals.  Patient has provided us with these.  In accordance with WPATH Standards Of Care guidelines, patient is a potential candidate for minimal depth penile inversion vaginoplasty as an inpatient at the [facility] with a postoperative admission for pain control and monitoring purposes.  The operation is performed " in conjunction with Dr. Javid Kendall given the need for surgical expertise from 2 separate subspecialties.  I explained the vaginoplasty procedure in detail today, which include a short neovaginal cavity dissection, grafting of neovaginal cavity, vaginal colpopexy, clitoroplasty, urethroplasty, perineal flap, orchiectomy, penectomy, scrotectomy, and bilateral labiaplasty.  Patient and I discussed the risks involved to include bleeding, infection, injury to surrounding structures, fluid collection, wound dehiscence with prolonged dressing changes, asymmetry, contour deformity, DVT, PE, clitoral necrosis, sensory loss, voiding issues, urinary stream abnormalities, flap loss, granulation tissue, chronic pain, incompletely feminized external vulva, and need for revision surgery.  Patient accepts these risks and wishes to work towards obtaining surgery.  I also explained to her that with this surgical technique, we are relying on anterior blood supply to heal the surgical site, preventing me from performing anterior labiaplasty.  And therefore she may require a second stage feminizing labiaplasty to fully feminize the external vulva.  She understood this.  Patient will be instructed to discontinue hormone therapy 2 weeks prior to surgery and resume it 2 weeks postoperatively to control its risk of DVT.  Patient will need to obtain a clear urine cotinine test.  Patient will perform a bowel prep 1 to 2 days prior to surgery.    Total time spent with patient was 45 min of which greater than 50% was in counseling.

## 2019-12-12 ENCOUNTER — TELEPHONE (OUTPATIENT)
Dept: PLASTIC SURGERY | Facility: CLINIC | Age: 29
End: 2019-12-12

## 2020-01-01 NOTE — PROGRESS NOTES
[facility] Consult H&P    Name: Nay Lainez    MRN: [id number]   YOB: 1990                 Chief Complaint:   Gender Dysphoria          History of Present Illness:   Nay Lainez is a 27 year old transgender female  seen in consultation for gender dysphoria    Patient has been living as a female for 6 years  Preferred pronouns are: her/hers (female)  The patient has been on exogenous hormones since: estrogen, spironolactone.  In terms of an intimate relationship, the patient is  to a transmale.  In terms of fertility, the patient: no interested in kids.    The patient has obtained 2 letters of support. They are in EPIC. They are adequate.    She has schizophrenia but is well controlled. No hospitalization or ER visit in > 1 year.    The patient has previously undergone breast augmentation, rhinoplasty, upper lip lift and lower lip tuck - Dr. Paul/Manjit @ [facility]. No complaints     Long-term surgical goals for the patient include: Orchiectomy and Vaginoplasty. Want to think a little further and delay vaginoplasty until we are more experienced.    The patient is here today expressing interest in Orchiectomy.         Past Medical History:     Past Medical History:   Diagnosis Date     Anxiety      Depressive disorder      Male-to-female transgender person             Past Surgical History:     Past Surgical History:   Procedure Laterality Date     BREAST SURGERY      augmentation     COSMETIC SURGERY      lip and nose nurgeries            Social History:     Social History   Substance Use Topics     Smoking status: Current Every Day Smoker     Packs/day: 1.00     Types: Cigarettes     Smokeless tobacco: Never Used     Alcohol use No            Family History:   None relevant         Allergies:   No Known Allergies         Medications:     Current Outpatient Prescriptions   Medication Sig     acyclovir (ZOVIRAX) 400 MG tablet Take 400 mg by mouth 2  times daily     chlorproMAZINE (THORAZINE) 25 MG tablet Take 25 mg by mouth 4 times daily     diazepam (VALIUM) 10 MG tablet Take 10 mg by mouth 2 times daily     emtricitabine-tenofovir (TRUVADA) 200-300 MG per tablet Take 1 tablet by mouth daily      estradiol valerate (DELESTROGEN) 20 MG/ML injection Inject 2 mg into the muscle every 7 days Takes on Sundays      FLUoxetine (PROZAC) 20 MG capsule Take 20 mg by mouth daily     fluticasone (FLONASE) 50 MCG/ACT nasal spray Spray 2 sprays into both nostrils every evening      lithium (ESKALITH/LITHOBID) 300 MG CR tablet Take 900 mg by mouth At Bedtime      loratadine (CLARITIN) 10 MG tablet Take 10 mg by mouth At Bedtime      RisperiDONE (RISPERDAL PO) Take 4 mg by mouth At Bedtime      No current facility-administered medications for this visit.              Review of Systems:    ROS: 14 point ROS neg other than the symptoms noted above in the HPI.          Physical Exam:   /66  Pulse 88  Ht 1.829 m (6')  Wt 64.1 kg (141 lb 4.8 oz)  BMI 19.16 kg/m2  General: age-appropriate in NAD  HEENT: Head AT/NC, EOMI, CN Grossly intact  Resp: no respiratory distress, lung sounds clear.  CV: heart rate regular, S1, S2.  Lymph: No cervical, supraclavicular or axillary lymphadenopathy  Back: bony spine is non-tender, flanks are nontender  Abdomen: not obese, soft, non-distended, non-tender. No organomegaly  : testicles normal without atrophy or masses and penis normal without urethral discharge  LE: no edema.   Neuro: grossly intact  Motor: excellent strength throughout  Skin: clear of rashes or ecchymoses.          Outside records:    I spent 10 minutes reviewing records.         Assessment and Plan:   27 year old transgender female with gender dysphoria    FOR TRANSGENDER ORCHIECTOMY PATIENTS:  The criteria for genital surgery are specific to the type of surgery being requested.  Criteria for orchiectomy in MtF patients (WPATH version 7)    1. Persistent, well  documented gender dysphoria;  2. Capacity to make a fully informed decision and to consent for treatment;  3. Age of consent (>18 years old)  4. If significant medical or mental health concerns are present, they must be well controlled.  5. 12 continuous months of hormone therapy as appropriate to the patient s gender goals (unless  the patient has a medical contraindication or is otherwise unable or unwilling to take  Hormones).  6. Two letters of support    The aim of hormone therapy prior to gonadectomy is primarily to introduce a period of reversible  estrogen or testosterone suppression, before the patient undergoes irreversible surgical intervention.    She meets all criteria today.    I reviewed the steps of orchiectomy. I reviewed the surgical procedure. I reviewed the risks and benefits including bleeding, infection and irreversible nature of the procedure.     Ultimately, she desires vaginoplasty but she wants to wait longer until more have been done at this center.    Javid Kendall MD   Reconstructive Urology  [facility]          47 48.5

## 2020-01-02 ENCOUNTER — TELEPHONE (OUTPATIENT)
Dept: PLASTIC SURGERY | Facility: CLINIC | Age: 30
End: 2020-01-02

## 2020-01-03 ENCOUNTER — TRANSFERRED RECORDS (OUTPATIENT)
Dept: HEALTH INFORMATION MANAGEMENT | Facility: CLINIC | Age: 30
End: 2020-01-03

## 2020-01-06 ENCOUNTER — TELEPHONE (OUTPATIENT)
Dept: PLASTIC SURGERY | Facility: CLINIC | Age: 30
End: 2020-01-06

## 2020-01-06 ENCOUNTER — TRANSFERRED RECORDS (OUTPATIENT)
Dept: HEALTH INFORMATION MANAGEMENT | Facility: CLINIC | Age: 30
End: 2020-01-06

## 2020-01-14 ENCOUNTER — TELEPHONE (OUTPATIENT)
Dept: PLASTIC SURGERY | Facility: CLINIC | Age: 30
End: 2020-01-14

## 2020-01-21 ENCOUNTER — TELEPHONE (OUTPATIENT)
Dept: PLASTIC SURGERY | Facility: CLINIC | Age: 30
End: 2020-01-21

## 2020-01-21 NOTE — TELEPHONE ENCOUNTER
called to check status of pa-was approved at in network benefits for both drs-per [email or web address], call ref#[id number], left a  with PA line to fax me copies of these approvals

## 2020-01-22 DIAGNOSIS — F64.0 GENDER DYSPHORIA IN ADULT: Primary | ICD-10-CM

## 2020-01-22 RX ORDER — HEPARIN SODIUM 5000 [USP'U]/.5ML
5000 INJECTION, SOLUTION INTRAVENOUS; SUBCUTANEOUS
Status: CANCELLED | OUTPATIENT
Start: 2020-01-22

## 2020-02-10 ENCOUNTER — HOSPITAL ENCOUNTER (INPATIENT)
Facility: CLINIC | Age: 30
Setting detail: SURGERY ADMIT
End: 2020-02-10
Attending: PLASTIC SURGERY | Admitting: PLASTIC SURGERY
Payer: COMMERCIAL

## 2020-02-10 ENCOUNTER — TELEPHONE (OUTPATIENT)
Dept: PLASTIC SURGERY | Facility: CLINIC | Age: 30
End: 2020-02-10

## 2020-02-10 DIAGNOSIS — F64.0 GENDER DYSPHORIA IN ADULT: ICD-10-CM

## 2020-02-10 NOTE — TELEPHONE ENCOUNTER
ORDER RECEIVED VIA: CASE MESSAGE    Spoke with patient to schedule surgery with Dr Javid Kendall and Dr João Díaz     Surgery was scheduled on 6/18 at [facility]    Patient will have pre-surgery visit with PAC       -Patient advised H&P is needed or surgery cancellation may occur    Post-Op care appointment scheduled?  YES on 7/7    Patient is aware a / is needed day of surgery.     Surgery packet was sent via [employer], patient has my direct contact information for any further questions.     Litzy Morales  Surgical Nikole-Op Coordinator  [phone]

## 2020-02-11 NOTE — TELEPHONE ENCOUNTER
FUTURE VISIT INFORMATION      SURGERY INFORMATION:    Date: 20    Location: [facility] OR    Surgeon:  João Díaz MD    Anesthesia Type:  General    Procedure: Vaginoplasty. minimal depth    Consult: OV 12/3/19-DENITA    RECORDS REQUESTED FROM:       Primary Care Provider: Joan Jacobs APRN, CNP- [facility]    Most recent EKG+ Tracin/10/20- [facility]- requested tracing

## 2020-03-10 ENCOUNTER — HEALTH MAINTENANCE LETTER (OUTPATIENT)
Age: 30
End: 2020-03-10

## 2020-04-06 ENCOUNTER — PATIENT OUTREACH (OUTPATIENT)
Dept: PLASTIC SURGERY | Facility: CLINIC | Age: 30
End: 2020-04-06

## 2020-04-06 NOTE — PROGRESS NOTES
Pt called concerned about her upcoming surgery in June. Pt was informed no decision regarding surgery cancellations in June has been made. Pt asked several future timing questions which could not be answered. Coping skills and empathic listening were performed.     Genaro Fallon, [facility]  Transgender Care Coordinator

## 2020-05-04 DIAGNOSIS — Z11.59 ENCOUNTER FOR SCREENING FOR OTHER VIRAL DISEASES: Primary | ICD-10-CM

## 2020-05-05 ENCOUNTER — PATIENT OUTREACH (OUTPATIENT)
Dept: PLASTIC SURGERY | Facility: CLINIC | Age: 30
End: 2020-05-05

## 2020-05-05 NOTE — PROGRESS NOTES
Mailed pt COVID letter regarding CGC services, including changes in surgery scheduling and virtual visits.       Genaro Fallon, [facility]  Transgender Care Coordinator

## 2020-06-02 ENCOUNTER — PRE VISIT (OUTPATIENT)
Dept: SURGERY | Facility: CLINIC | Age: 30
End: 2020-06-02

## 2020-09-22 ENCOUNTER — TELEPHONE (OUTPATIENT)
Dept: PLASTIC SURGERY | Facility: CLINIC | Age: 30
End: 2020-09-22

## 2020-09-22 NOTE — TELEPHONE ENCOUNTER
Patient called back and left a message asked for November date. She changed her insurance and will need to update.

## 2020-09-22 NOTE — TELEPHONE ENCOUNTER
[facility] offering surgery date of 11/12 with Dr. Díaz and Dr. Kendall.    Noted that she will need a surgical consult; PAC appointment, COVID-19 test and post-op appointment as well.    Provided contact number of [phone] and requested that a voicemail is left for us.

## 2020-09-23 ENCOUNTER — TELEPHONE (OUTPATIENT)
Dept: PLASTIC SURGERY | Facility: CLINIC | Age: 30
End: 2020-09-23

## 2020-09-23 NOTE — TELEPHONE ENCOUNTER
I spoke with Nay and offered her 11/12 as her surgery date.     She will be getting new insurance:   [employer]   Effective 10/1     She will not know the member ID number until 10/1.     I am holding time for her in January (hoping January 14th) so that we can get a new PA and everything needed. She also cannot take off of work in December. She knows that January 14th is tentative.    No further questions, she knows we will reach out likely in December once we can actually schedule.

## 2020-10-02 ENCOUNTER — TELEPHONE (OUTPATIENT)
Dept: PLASTIC SURGERY | Facility: CLINIC | Age: 30
End: 2020-10-02

## 2020-10-02 NOTE — TELEPHONE ENCOUNTER
left a vm with [facility] pa team, do i need to request a new pa, if only the group# changes?

## 2020-10-05 ENCOUNTER — TELEPHONE (OUTPATIENT)
Dept: PLASTIC SURGERY | Facility: CLINIC | Age: 30
End: 2020-10-05

## 2020-10-05 NOTE — TELEPHONE ENCOUNTER
per VM from Eleazar at [facility], no changes are needed, patient went from commercial plan to medicaid plan, PA is still good

## 2020-10-26 ENCOUNTER — TELEPHONE (OUTPATIENT)
Dept: PLASTIC SURGERY | Facility: CLINIC | Age: 30
End: 2020-10-26

## 2020-10-26 DIAGNOSIS — Z71.6 ENCOUNTER FOR SMOKING CESSATION COUNSELING: Primary | ICD-10-CM

## 2020-10-26 NOTE — TELEPHONE ENCOUNTER
Spoke with pt and confirmed that  checked with new insurance and new PA is not needed. Pt states she did start smoking again. States she understands she needs to be off nicotine for at least 4 weeks prior to surgery. Plan to complete cotinine test 5-6 weeks prior to surgery to allow enough time for negative result prior to surgery. Pt requests order be faxed to [facility] on [address]. Reviewed need to discontinue hormones 2 weeks prior to surgery. Pt states understanding. Educational packet to be sent to pt via [employer] for review. Sintia CHAVEZ RNCC

## 2020-10-26 NOTE — TELEPHONE ENCOUNTER
Patient called and left several voicemails asking if we have her new insurance information and if anything has been approved. Routing to PA specialist.

## 2020-10-27 ENCOUNTER — HOSPITAL ENCOUNTER (EMERGENCY)
Facility: CLINIC | Age: 30
Discharge: HOME | End: 2020-10-27
Attending: EMERGENCY MEDICINE | Admitting: EMERGENCY MEDICINE
Payer: COMMERCIAL

## 2020-10-27 ENCOUNTER — TELEPHONE (OUTPATIENT)
Dept: BEHAVIORAL HEALTH | Facility: CLINIC | Age: 30
End: 2020-10-27

## 2020-10-27 VITALS
DIASTOLIC BLOOD PRESSURE: 76 MMHG | HEART RATE: 101 BPM | TEMPERATURE: 98.1 F | OXYGEN SATURATION: 98 % | SYSTOLIC BLOOD PRESSURE: 119 MMHG

## 2020-10-27 DIAGNOSIS — F13.10 BENZODIAZEPINE ABUSE (H): ICD-10-CM

## 2020-10-27 DIAGNOSIS — Z20.828 EXPOSURE TO SARS-ASSOCIATED CORONAVIRUS: ICD-10-CM

## 2020-10-27 LAB
ALBUMIN SERPL-MCNC: 4.3 G/DL (ref 3.4–5)
ALP SERPL-CCNC: 68 U/L (ref 40–150)
ALT SERPL W P-5'-P-CCNC: 22 U/L (ref 0–70)
AMPHETAMINES UR QL SCN: NEGATIVE
AMPHETAMINES UR QL SCN: NEGATIVE
ANION GAP SERPL CALCULATED.3IONS-SCNC: 6 MMOL/L (ref 3–14)
AST SERPL W P-5'-P-CCNC: 18 U/L (ref 0–45)
BARBITURATES UR QL: NEGATIVE
BARBITURATES UR QL: NEGATIVE
BASOPHILS # BLD AUTO: 0.1 10E9/L (ref 0–0.2)
BASOPHILS NFR BLD AUTO: 1 %
BENZODIAZ UR QL: NEGATIVE
BENZODIAZ UR QL: NEGATIVE
BILIRUB SERPL-MCNC: 0.4 MG/DL (ref 0.2–1.3)
BUN SERPL-MCNC: 6 MG/DL (ref 7–30)
CALCIUM SERPL-MCNC: 8.5 MG/DL (ref 8.5–10.1)
CANNABINOIDS UR QL SCN: NEGATIVE
CANNABINOIDS UR QL SCN: NEGATIVE
CHLORIDE SERPL-SCNC: 107 MMOL/L (ref 94–109)
CO2 SERPL-SCNC: 26 MMOL/L (ref 20–32)
COCAINE UR QL: NEGATIVE
COCAINE UR QL: NEGATIVE
CREAT SERPL-MCNC: 0.84 MG/DL (ref 0.66–1.25)
DIFFERENTIAL METHOD BLD: NORMAL
EOSINOPHIL # BLD AUTO: 0.2 10E9/L (ref 0–0.7)
EOSINOPHIL NFR BLD AUTO: 3.9 %
ERYTHROCYTE [DISTWIDTH] IN BLOOD BY AUTOMATED COUNT: 11.8 % (ref 10–15)
ETHANOL SERPL-MCNC: <0.01 G/DL
ETHANOL UR QL SCN: NEGATIVE
ETHANOL UR QL SCN: NEGATIVE
GFR SERPL CREATININE-BSD FRML MDRD: >90 ML/MIN/{1.73_M2}
GLUCOSE SERPL-MCNC: 94 MG/DL (ref 70–99)
HCT VFR BLD AUTO: 43.3 % (ref 40–53)
HGB BLD-MCNC: 15.7 G/DL (ref 13.3–17.7)
IMM GRANULOCYTES # BLD: 0 10E9/L (ref 0–0.4)
IMM GRANULOCYTES NFR BLD: 0.2 %
LYMPHOCYTES # BLD AUTO: 1.4 10E9/L (ref 0.8–5.3)
LYMPHOCYTES NFR BLD AUTO: 23 %
MCH RBC QN AUTO: 32.8 PG (ref 26.5–33)
MCHC RBC AUTO-ENTMCNC: 36.3 G/DL (ref 31.5–36.5)
MCV RBC AUTO: 90 FL (ref 78–100)
MONOCYTES # BLD AUTO: 0.3 10E9/L (ref 0–1.3)
MONOCYTES NFR BLD AUTO: 5 %
NEUTROPHILS # BLD AUTO: 4.2 10E9/L (ref 1.6–8.3)
NEUTROPHILS NFR BLD AUTO: 66.9 %
NRBC # BLD AUTO: 0 10*3/UL
NRBC BLD AUTO-RTO: 0 /100
OPIATES UR QL SCN: NEGATIVE
OPIATES UR QL SCN: NEGATIVE
PLATELET # BLD AUTO: 201 10E9/L (ref 150–450)
POTASSIUM SERPL-SCNC: 3.7 MMOL/L (ref 3.4–5.3)
PROT SERPL-MCNC: 7.8 G/DL (ref 6.8–8.8)
RBC # BLD AUTO: 4.79 10E12/L (ref 4.4–5.9)
SARS-COV-2 RNA SPEC QL NAA+PROBE: NORMAL
SODIUM SERPL-SCNC: 139 MMOL/L (ref 133–144)
SPECIMEN SOURCE: NORMAL
WBC # BLD AUTO: 6.2 10E9/L (ref 4–11)

## 2020-10-27 PROCEDURE — 85025 COMPLETE CBC W/AUTO DIFF WBC: CPT | Performed by: EMERGENCY MEDICINE

## 2020-10-27 PROCEDURE — 80053 COMPREHEN METABOLIC PANEL: CPT | Performed by: EMERGENCY MEDICINE

## 2020-10-27 PROCEDURE — 99283 EMERGENCY DEPT VISIT LOW MDM: CPT | Performed by: EMERGENCY MEDICINE

## 2020-10-27 PROCEDURE — 250N000013 HC RX MED GY IP 250 OP 250 PS 637: Performed by: EMERGENCY MEDICINE

## 2020-10-27 PROCEDURE — U0003 INFECTIOUS AGENT DETECTION BY NUCLEIC ACID (DNA OR RNA); SEVERE ACUTE RESPIRATORY SYNDROME CORONAVIRUS 2 (SARS-COV-2) (CORONAVIRUS DISEASE [COVID-19]), AMPLIFIED PROBE TECHNIQUE, MAKING USE OF HIGH THROUGHPUT TECHNOLOGIES AS DESCRIBED BY CMS-2020-01-R: HCPCS | Performed by: EMERGENCY MEDICINE

## 2020-10-27 PROCEDURE — C9803 HOPD COVID-19 SPEC COLLECT: HCPCS

## 2020-10-27 PROCEDURE — 80307 DRUG TEST PRSMV CHEM ANLYZR: CPT | Performed by: EMERGENCY MEDICINE

## 2020-10-27 PROCEDURE — 80320 DRUG SCREEN QUANTALCOHOLS: CPT | Performed by: EMERGENCY MEDICINE

## 2020-10-27 PROCEDURE — 99283 EMERGENCY DEPT VISIT LOW MDM: CPT

## 2020-10-27 RX ORDER — NICOTINE 21 MG/24HR
1 PATCH, TRANSDERMAL 24 HOURS TRANSDERMAL ONCE
Status: DISCONTINUED | OUTPATIENT
Start: 2020-10-27 | End: 2020-10-27 | Stop reason: HOSPADM

## 2020-10-27 RX ORDER — LURASIDONE HYDROCHLORIDE 60 MG/1
60 TABLET, FILM COATED ORAL
COMMUNITY

## 2020-10-27 RX ORDER — OLANZAPINE 5 MG/1
10 TABLET ORAL AT BEDTIME
COMMUNITY

## 2020-10-27 RX ORDER — LORAZEPAM 1 MG/1
1 TABLET ORAL 4 TIMES DAILY
Status: ON HOLD | COMMUNITY
End: 2020-11-03

## 2020-10-27 RX ORDER — CLONAZEPAM 1 MG/1
1 TABLET ORAL 3 TIMES DAILY
COMMUNITY
End: 2020-10-27

## 2020-10-27 RX ORDER — BENZTROPINE MESYLATE 0.5 MG/1
0.5 TABLET ORAL 2 TIMES DAILY
COMMUNITY
End: 2021-09-28

## 2020-10-27 RX ADMIN — NICOTINE 1 PATCH: 21 PATCH, EXTENDED RELEASE TRANSDERMAL at 15:25

## 2020-10-27 NOTE — TELEPHONE ENCOUNTER
Patient cleared and ready for behavioral bed placement: Yes    S Pt is a 29 year old female to male transgender pt in the [place] [facility].    B Pt has been using 8mg of ativan daily over past year. Pt has history of depression and anxiety no concerns at this time. Pt denoes other drug use and ETOH. Pt denies sz. Pt denies covid19 symptoms. Pt is medically cleared for detox. UTOX is negative. Pt labs have resulted urea nitrogen is 6.    A Vol    R removed from worklist    - 431pm ed unsure why utox is negative. ED is unsure why pt UTOX is negative. Pt is adamant that she took ativan from a pharmacy and has been prescribed benzodiazipens. ED is sending a second UTOX. Pt does not appear to be lying to according to ED MD. Pt seems genuinely surprised by UTOX being negative.     - repaged on call 437PM doc-doc started 439PM pt seems upfront about benzo use. Pt has been using at least 8mg daily primarly ativan and klonopin use. Pt has no sz. Pt has history of withdrawal but not in withdrawal at this time. Pt has no physical symptoms in ed. ED ordering repeat UDS positive pt will be admitted to detox and if negative pt will be discharged back to community    - 520PM UTOX is negative removed from worklist per Doc-doc instructions

## 2020-10-27 NOTE — ED AVS SNAPSHOT
[facility]  [address]  [place] [address]  Phone: [phone]  Fax: [phone]                                    Nay Lainez   MRN: [id number]    Department: [facility]   Date of Visit: 10/27/2020           After Visit Summary Signature Page    I have received my discharge instructions, and my questions have been answered. I have discussed any challenges I see with this plan with the nurse or doctor.    ..........................................................................................................................................  Patient/Patient Representative Signature      ..........................................................................................................................................  Patient Representative Print Name and Relationship to Patient    ..................................................               ................................................  Date                                   Time    ..........................................................................................................................................  Reviewed by Signature/Title    ...................................................              ..............................................  Date                                               Time          22EPIC Rev 08/18

## 2020-10-27 NOTE — PHARMACY-ADMISSION MEDICATION HISTORY
Admission Medication History Completed by Pharmacy    See [facility] Admission Navigator for allergy information, preferred outpatient pharmacy, prior to admission medications and immunization status.     Medication history sources:  patient via iPad interview, [employer] dispense history, Care Everywhere ([facility]), [facility] ([phone])    Changes made to PTA medication list (reason)  Added:   - Latuda, benztropine, lorazepam  Deleted:   - Tuvada, fluoxetine (not taking per pt)  Changed:   - estradiol valerate 4 mg q7days-->Depo-estradiol 1.5 mg q6days (per pt, [facility])    Additional medication history information:   - Patient reports taking benztropine once daily HS vs BID as prescribed. When prompted, she stated that she never looked at the directions on the bottle and assumed it was once daily dosing.  - Patient reports taking the following supplements: bovine collagen peptides vitamin, super food vitamin, and biotin.  - Patient denies taking any additional Rx/OTC medications other than the ones discussed above and listed below.    Prior to Admission medications    Medication Sig Last Dose Taking? Auth Provider   benztropine (COGENTIN) 0.5 MG tablet Take 0.5 mg by mouth 2 times daily 10/26/2020 Yes Unknown, Entered By History   estradiol cypionate (DEPO-ESTRADIOL) 5 MG/ML injection Inject 1.5 mg into the muscle every 6 days 10/22/2020 Yes Unknown, Entered By History   LORazepam (ATIVAN) 1 MG tablet Take 1 mg by mouth 4 times daily 10/27/2020 Yes Unknown, Entered By History   lurasidone (LATUDA) 60 MG TABS tablet Take 60 mg by mouth daily (with dinner)  10/26/2020 Yes Unknown, Entered By History   OLANZapine (ZYPREXA) 5 MG tablet Take 5 mg by mouth At Bedtime 10/26/2020 Yes Unknown, Entered By History     Date completed: 10/27/20    Medication history completed by:   Javid Mandel, Lorena  [facility]  Emergency Department: Ascom *[id number]

## 2020-10-27 NOTE — ED PROVIDER NOTES
History     Chief Complaint   Patient presents with     Addiction Problem     Pt seeking detox from benzos, last use 1300 (2mg Ativan)      HPI  Nay Lainez is a 29 year old adult (male to female transgendered individual) with a past medical history of anxiety, depression, schizoaffective disorder, benzodiazepine abuse who presents to the emergency department with a chief complaint of seeking detox.  The patient is seeking detox from benzodiazepines.  Her last use 1300 today (she took 2 mg of Ativan at that time).  She reports she has been using up to 8 mg of Ativan a day (as well as other benzodiazepines) for the past year.  Denies alcohol or other drug use.  She does smoke 1 pack of cigarettes per day.  She would like a nicotine patch and is aware she is not allowed to smoke when she enters detox.  No history of withdrawal seizures.    I have reviewed the Medications, Allergies, Past Medical and Surgical History, and Social History in the [employer] system.    Past Medical History:   Diagnosis Date     Anxiety      Depressive disorder      Male-to-female transgender person      Past Surgical History:   Procedure Laterality Date     BREAST SURGERY      augmentation     COSMETIC SURGERY      lip and nose nurgeries     ORCHIECTOMY SCROTAL Bilateral 11/2/2018    Procedure: Bilateral Orchiectomy;  Surgeon: Javid Kendall MD;  Location:  OR     No current facility-administered medications for this encounter.      Current Outpatient Medications   Medication     emtricitabine-tenofovir (TRUVADA) 200-300 MG per tablet     estradiol valerate (DELESTROGEN) 20 MG/ML injection     FLUoxetine (PROZAC) 20 MG capsule     nicotine polacrilex 4 MG lozenge     OLANZapine (ZYPREXA) 10 MG tablet     No Known Allergies  Past medical history, past surgical history, medications, and allergies were reviewed with the patient. Additional pertinent items: None    Social History     Socioeconomic History     Marital status:       Spouse name: Not on file     Number of children: Not on file     Years of education: Not on file     Highest education level: Not on file   Occupational History     Not on file   Social Needs     Financial resource strain: Not on file     Food insecurity     Worry: Not on file     Inability: Not on file     Transportation needs     Medical: Not on file     Non-medical: Not on file   Tobacco Use     Smoking status: Current Every Day Smoker     Packs/day: 2.00     Smokeless tobacco: Never Used     Tobacco comment: currently vapes nicotine   Substance and Sexual Activity     Alcohol use: No     Comment: had 4 shots of vodak today     Drug use: No     Comment: every few days     Sexual activity: Yes   Lifestyle     Physical activity     Days per week: Not on file     Minutes per session: Not on file     Stress: Not on file   Relationships     Social connections     Talks on phone: Not on file     Gets together: Not on file     Attends Judaism service: Not on file     Active member of club or organization: Not on file     Attends meetings of clubs or organizations: Not on file     Relationship status: Not on file     Intimate partner violence     Fear of current or ex partner: Not on file     Emotionally abused: Not on file     Physically abused: Not on file     Forced sexual activity: Not on file   Other Topics Concern     Not on file   Social History Narrative    Born in [place] and primarily raised by mother as father was traveling for business. No abuse or growing up completed school on time and went to some college. 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  about 3 years ago and lives with  and apartment. Never in the  no children and no access to guns. Erns income by prostitution.     Social history was reviewed with the patient. Additional pertinent items: None    Review of Systems  General: No fevers or chills  Skin: No rash or  diaphoresis  Eyes: No eye redness or discharge  Ears/Nose/Throat: No rhinorrhea or nasal congestion  Respiratory: No cough or SOB  Cardiovascular: No chest pain or palpitations  Gastrointestinal: No nausea, vomiting, or diarrhea  Genitourinary: No urinary frequency, hematuria, or dysuria  Musculoskeletal: No arthralgias or myalgias  Neurologic: No numbness or weakness  Psychiatric: No depression or SI  Hematologic/Lymphatic/Immunologic: No leg swelling, no easy bruising/bleeding  Endocrine: No polyuria/polydypsia    A complete review of systems was performed with pertinent positives and negatives noted in the HPI, and all other systems negative.    Physical Exam   BP: 119/76  Pulse: 101  Temp: 98.1  F (36.7  C)  SpO2: 98 %      General: Well nourished, well developed, NAD  HEENT: EOMI, anicteric. NCAT, MMM  Neck: no jugular venous distension, supple, nl ROM  Cardiac: Regular rate and rhythm. No murmurs, rubs, or gallops. Normal S1, S2.  Intact peripheral pulses  Pulm: CTAB, no stridor, wheezes, rales, rhonchi  Abd: Soft, nontender, nondistended.  No masses palpated.    Skin: Warm and dry to the touch.  No rash  Extremities: No LE edema, no cyanosis, w/w/p  Neuro: A&Ox3, no gross focal deficits    ED Course        Procedures                           Labs Ordered and Resulted from Time of ED Arrival Up to the Time of Departure from the ED   COMPREHENSIVE METABOLIC PANEL - Abnormal; Notable for the following components:       Result Value    Urea Nitrogen 6 (*)     All other components within normal limits   CBC WITH PLATELETS DIFFERENTIAL   ALCOHOL ETHYL   DRUG ABUSE SCREEN 6 CHEM DEP URINE ([facility])   COVID-19 VIRUS (CORONAVIRUS) BY PCR   DRUG ABUSE SCREEN 6 CHEM DEP URINE ([facility])            Results for orders placed or performed during the hospital encounter of 10/27/20 (from the past 24 hour(s))   CBC with platelets differential   Result Value Ref Range    WBC 6.2 4.0 - 11.0 10e9/L    RBC Count 4.79 4.4 - 5.9 10e12/L     Hemoglobin 15.7 13.3 - 17.7 g/dL    Hematocrit 43.3 40.0 - 53.0 %    MCV 90 78 - 100 fl    MCH 32.8 26.5 - 33.0 pg    MCHC 36.3 31.5 - 36.5 g/dL    RDW 11.8 10.0 - 15.0 %    Platelet Count 201 150 - 450 10e9/L    Diff Method Automated Method     % Neutrophils 66.9 %    % Lymphocytes 23.0 %    % Monocytes 5.0 %    % Eosinophils 3.9 %    % Basophils 1.0 %    % Immature Granulocytes 0.2 %    Nucleated RBCs 0 0 /100    Absolute Neutrophil 4.2 1.6 - 8.3 10e9/L    Absolute Lymphocytes 1.4 0.8 - 5.3 10e9/L    Absolute Monocytes 0.3 0.0 - 1.3 10e9/L    Absolute Eosinophils 0.2 0.0 - 0.7 10e9/L    Absolute Basophils 0.1 0.0 - 0.2 10e9/L    Abs Immature Granulocytes 0.0 0 - 0.4 10e9/L    Absolute Nucleated RBC 0.0    Comprehensive metabolic panel   Result Value Ref Range    Sodium 139 133 - 144 mmol/L    Potassium 3.7 3.4 - 5.3 mmol/L    Chloride 107 94 - 109 mmol/L    Carbon Dioxide 26 20 - 32 mmol/L    Anion Gap 6 3 - 14 mmol/L    Glucose 94 70 - 99 mg/dL    Urea Nitrogen 6 (L) 7 - 30 mg/dL    Creatinine 0.84 0.66 - 1.25 mg/dL    GFR Estimate >90 >60 mL/min/[1.73_m2]    GFR Estimate If Black >90 >60 mL/min/[1.73_m2]    Calcium 8.5 8.5 - 10.1 mg/dL    Bilirubin Total 0.4 0.2 - 1.3 mg/dL    Albumin 4.3 3.4 - 5.0 g/dL    Protein Total 7.8 6.8 - 8.8 g/dL    Alkaline Phosphatase 68 40 - 150 U/L    ALT 22 0 - 70 U/L    AST 18 0 - 45 U/L   Alcohol ethyl   Result Value Ref Range    Ethanol g/dL <0.01 <0.01 g/dL   Drug abuse screen 6 urine (chem dep)   Result Value Ref Range    Amphetamine Qual Urine Negative NEG^Negative    Barbiturates Qual Urine Negative NEG^Negative    Benzodiazepine Qual Urine Negative NEG^Negative    Cannabinoids Qual Urine Negative NEG^Negative    Cocaine Qual Urine Negative NEG^Negative    Ethanol Qual Urine Negative NEG^Negative    Opiates Qualitative Urine Negative NEG^Negative   Drug abuse screen 6 urine (chem dep)   Result Value Ref Range    Amphetamine Qual Urine Negative NEG^Negative    Barbiturates  Qual Urine Negative NEG^Negative    Benzodiazepine Qual Urine Negative NEG^Negative    Cannabinoids Qual Urine Negative NEG^Negative    Cocaine Qual Urine Negative NEG^Negative    Ethanol Qual Urine Negative NEG^Negative    Opiates Qualitative Urine Negative NEG^Negative       Labs, vital signs, and imaging studies were reviewed by me.    Medications   nicotine (NICODERM CQ) 21 MG/24HR 24 hr patch 1 patch (1 patch Transdermal Patch/Med Applied 10/27/20 1525)       Assessments & Plan (with Medical Decision Making)   Nay Lainez is a 29 year old adult who presents to the emergency department seeking detox placement.    1430 patient was discussed with detox intake, they do have a bed available for the patient.  Medical clearance labs were ordered.    Laboratory work-up is unremarkable     1600 patient discussed with detox intake, initial plan was to admit the patient to detox, however UDS was negative for benzodiazepines.  Patient is adamant that she believes she took benzodiazepines, however a repeat urine drug screen was also negative for benzodiazepines.  This was discussed with Dr. Love, she is unable to accept the patient to detox at this time given negative urine drug screen without physical symptoms of withdrawal.  Patient to be discharged home.  Patient was advised to follow-up with her PCP and to return to the emergency department immediately should she develop any new or worsening symptoms, particularly any symptoms consistent with benzodiazepine withdrawal.    I have reviewed the nursing notes.    I have reviewed the findings, diagnosis, plan and need for follow up with the patient.      New Prescriptions    No medications on file       Final diagnoses:   Benzodiazepine abuse (H)       10/27/2020   [facility] EMERGENCY DEPARTMENT     Viridiana Brand MD  10/27/20 1606       Viridiana Brand MD  10/27/20 171       Viridiana Brand MD  10/27/20 1710

## 2020-10-27 NOTE — DISCHARGE INSTRUCTIONS
TODAY'S VISIT:  You were seen today for benzodiazepine use  -   - If you had any labs or imaging/radiology tests performed today, you should also discuss these tests with your usual provider.     FOLLOW-UP:  Please make an appointment to follow up with:  - Your Primary Care Provider. If you do not have a PCP, please call the Primary Care Center (phone: [phone] for an appointment    - Have your provider review the results from today's visit with you again to make sure no further follow-up or additional testing is needed based on those results.     PRESCRIPTIONS / MEDICATIONS:  - take medications only as prescribed    RETURN TO THE EMERGENCY DEPARTMENT  Return to the Emergency Department at any time for any new or worsening symptoms or any concerns.

## 2020-10-28 LAB
LABORATORY COMMENT REPORT: NORMAL
SARS-COV-2 RNA SPEC QL NAA+PROBE: NEGATIVE
SPECIMEN SOURCE: NORMAL

## 2020-10-29 ENCOUNTER — HOSPITAL ENCOUNTER (INPATIENT)
Facility: CLINIC | Age: 30
LOS: 5 days | Discharge: HOME | DRG: 897 | End: 2020-11-03
Attending: PSYCHIATRY & NEUROLOGY | Admitting: PSYCHIATRY & NEUROLOGY
Payer: COMMERCIAL

## 2020-10-29 ENCOUNTER — TELEPHONE (OUTPATIENT)
Dept: BEHAVIORAL HEALTH | Facility: CLINIC | Age: 30
End: 2020-10-29

## 2020-10-29 DIAGNOSIS — F13.20 SEDATIVE, HYPNOTIC OR ANXIOLYTIC DEPENDENCE (H): ICD-10-CM

## 2020-10-29 DIAGNOSIS — Z20.828 EXPOSURE TO SARS-ASSOCIATED CORONAVIRUS: ICD-10-CM

## 2020-10-29 DIAGNOSIS — Z86.59 HISTORY OF SCHIZOAFFECTIVE DISORDER: ICD-10-CM

## 2020-10-29 DIAGNOSIS — F25.0 SCHIZOAFFECTIVE DISORDER, BIPOLAR TYPE (H): ICD-10-CM

## 2020-10-29 DIAGNOSIS — F64.9 GENDER DYSPHORIA: ICD-10-CM

## 2020-10-29 LAB
AMPHETAMINES UR QL SCN: NEGATIVE
BARBITURATES UR QL: NEGATIVE
BENZODIAZ UR QL: NEGATIVE
CANNABINOIDS UR QL SCN: NEGATIVE
COCAINE UR QL: NEGATIVE
ETHANOL UR QL SCN: NEGATIVE
HCG UR QL: NEGATIVE
OPIATES UR QL SCN: NEGATIVE
SARS-COV-2 RNA SPEC QL NAA+PROBE: NORMAL
SPECIMEN SOURCE: NORMAL

## 2020-10-29 PROCEDURE — U0003 INFECTIOUS AGENT DETECTION BY NUCLEIC ACID (DNA OR RNA); SEVERE ACUTE RESPIRATORY SYNDROME CORONAVIRUS 2 (SARS-COV-2) (CORONAVIRUS DISEASE [COVID-19]), AMPLIFIED PROBE TECHNIQUE, MAKING USE OF HIGH THROUGHPUT TECHNOLOGIES AS DESCRIBED BY CMS-2020-01-R: HCPCS | Performed by: PSYCHIATRY & NEUROLOGY

## 2020-10-29 PROCEDURE — 250N000013 HC RX MED GY IP 250 OP 250 PS 637: Performed by: PSYCHIATRY & NEUROLOGY

## 2020-10-29 PROCEDURE — 128N000004 HC R&B CD ADULT

## 2020-10-29 PROCEDURE — 99285 EMERGENCY DEPT VISIT HI MDM: CPT | Mod: 25

## 2020-10-29 PROCEDURE — 80307 DRUG TEST PRSMV CHEM ANLYZR: CPT | Performed by: PSYCHIATRY & NEUROLOGY

## 2020-10-29 PROCEDURE — 80320 DRUG SCREEN QUANTALCOHOLS: CPT | Performed by: PSYCHIATRY & NEUROLOGY

## 2020-10-29 PROCEDURE — 80346 BENZODIAZEPINES1-12: CPT | Performed by: PSYCHIATRY & NEUROLOGY

## 2020-10-29 PROCEDURE — 99285 EMERGENCY DEPT VISIT HI MDM: CPT | Performed by: PSYCHIATRY & NEUROLOGY

## 2020-10-29 PROCEDURE — C9803 HOPD COVID-19 SPEC COLLECT: HCPCS

## 2020-10-29 PROCEDURE — 81025 URINE PREGNANCY TEST: CPT | Performed by: PSYCHIATRY & NEUROLOGY

## 2020-10-29 PROCEDURE — 250N000013 HC RX MED GY IP 250 OP 250 PS 637: Performed by: NURSE PRACTITIONER

## 2020-10-29 RX ORDER — NICOTINE 21 MG/24HR
1 PATCH, TRANSDERMAL 24 HOURS TRANSDERMAL DAILY
Status: DISCONTINUED | OUTPATIENT
Start: 2020-10-29 | End: 2020-11-03 | Stop reason: HOSPADM

## 2020-10-29 RX ORDER — ONDANSETRON 4 MG/1
4 TABLET, ORALLY DISINTEGRATING ORAL EVERY 6 HOURS PRN
Status: DISCONTINUED | OUTPATIENT
Start: 2020-10-29 | End: 2020-11-03 | Stop reason: HOSPADM

## 2020-10-29 RX ORDER — PHENOBARBITAL 64.8 MG/1
64.8 TABLET ORAL 2 TIMES DAILY
Status: DISCONTINUED | OUTPATIENT
Start: 2020-10-29 | End: 2020-10-30

## 2020-10-29 RX ORDER — OLANZAPINE 5 MG/1
5 TABLET ORAL AT BEDTIME
Status: DISCONTINUED | OUTPATIENT
Start: 2020-10-29 | End: 2020-11-03 | Stop reason: HOSPADM

## 2020-10-29 RX ORDER — LURASIDONE HYDROCHLORIDE 20 MG/1
60 TABLET, FILM COATED ORAL
Status: DISCONTINUED | OUTPATIENT
Start: 2020-10-29 | End: 2020-11-03 | Stop reason: HOSPADM

## 2020-10-29 RX ORDER — ALUMINA, MAGNESIA, AND SIMETHICONE 2400; 2400; 240 MG/30ML; MG/30ML; MG/30ML
30 SUSPENSION ORAL EVERY 4 HOURS PRN
Status: DISCONTINUED | OUTPATIENT
Start: 2020-10-29 | End: 2020-11-03 | Stop reason: HOSPADM

## 2020-10-29 RX ORDER — BENZTROPINE MESYLATE 0.5 MG/1
0.5 TABLET ORAL AT BEDTIME
Status: DISCONTINUED | OUTPATIENT
Start: 2020-10-29 | End: 2020-11-03 | Stop reason: HOSPADM

## 2020-10-29 RX ORDER — BISACODYL 10 MG
10 SUPPOSITORY, RECTAL RECTAL DAILY PRN
Status: DISCONTINUED | OUTPATIENT
Start: 2020-10-29 | End: 2020-11-03 | Stop reason: HOSPADM

## 2020-10-29 RX ORDER — ACETAMINOPHEN 325 MG/1
650 TABLET ORAL EVERY 4 HOURS PRN
Status: DISCONTINUED | OUTPATIENT
Start: 2020-10-29 | End: 2020-11-03 | Stop reason: HOSPADM

## 2020-10-29 RX ORDER — NICOTINE 21 MG/24HR
1 PATCH, TRANSDERMAL 24 HOURS TRANSDERMAL ONCE
Status: DISCONTINUED | OUTPATIENT
Start: 2020-10-29 | End: 2020-10-29

## 2020-10-29 RX ORDER — HYDROXYZINE HYDROCHLORIDE 25 MG/1
25 TABLET, FILM COATED ORAL EVERY 4 HOURS PRN
Status: DISCONTINUED | OUTPATIENT
Start: 2020-10-29 | End: 2020-10-30

## 2020-10-29 RX ORDER — TRAZODONE HYDROCHLORIDE 50 MG/1
50 TABLET, FILM COATED ORAL
Status: DISCONTINUED | OUTPATIENT
Start: 2020-10-29 | End: 2020-10-30

## 2020-10-29 RX ADMIN — OLANZAPINE 5 MG: 5 TABLET, FILM COATED ORAL at 21:52

## 2020-10-29 RX ADMIN — PHENOBARBITAL 64.8 MG: 64.8 TABLET ORAL at 20:29

## 2020-10-29 RX ADMIN — BENZTROPINE MESYLATE 0.5 MG: 0.5 TABLET ORAL at 21:52

## 2020-10-29 RX ADMIN — NICOTINE 1 PATCH: 21 PATCH, EXTENDED RELEASE TRANSDERMAL at 20:29

## 2020-10-29 RX ADMIN — LURASIDONE HYDROCHLORIDE 60 MG: 20 TABLET, FILM COATED ORAL at 20:29

## 2020-10-29 RX ADMIN — NICOTINE 1 PATCH: 21 PATCH, EXTENDED RELEASE TRANSDERMAL at 13:10

## 2020-10-29 ASSESSMENT — ENCOUNTER SYMPTOMS
NERVOUS/ANXIOUS: 1
GASTROINTESTINAL NEGATIVE: 1
HYPERACTIVE: 0
NEUROLOGICAL NEGATIVE: 1
CONSTITUTIONAL NEGATIVE: 1
HALLUCINATIONS: 0
EYES NEGATIVE: 1
MUSCULOSKELETAL NEGATIVE: 1
CARDIOVASCULAR NEGATIVE: 1
RESPIRATORY NEGATIVE: 1
DECREASED CONCENTRATION: 1

## 2020-10-29 ASSESSMENT — ACTIVITIES OF DAILY LIVING (ADL)
LAUNDRY: WITH SUPERVISION
HYGIENE/GROOMING: INDEPENDENT
DRESS: INDEPENDENT
ORAL_HYGIENE: INDEPENDENT

## 2020-10-29 ASSESSMENT — MIFFLIN-ST. JEOR: SCORE: 1564.53

## 2020-10-29 NOTE — PHARMACY-ADMISSION MEDICATION HISTORY
Admission Medication History Completed by Pharmacy    See [facility] Admission Navigator for allergy information, preferred outpatient pharmacy, prior to admission medications and immunization status.     Medication History Sources:     Recent medication history completed 10/27/20 at 5:04 PM by Javid Mandel PharmD. Please see previous note.    Prior to Admission medications    Medication Sig Last Dose Taking? Auth Provider   benztropine (COGENTIN) 0.5 MG tablet Take 0.5 mg by mouth 2 times daily   Unknown, Entered By History   estradiol cypionate (DEPO-ESTRADIOL) 5 MG/ML injection Inject 1.5 mg into the muscle every 6 days   Unknown, Entered By History   LORazepam (ATIVAN) 1 MG tablet Take 1 mg by mouth 4 times daily   Unknown, Entered By History   lurasidone (LATUDA) 60 MG TABS tablet Take 60 mg by mouth daily (with dinner)    Unknown, Entered By History   OLANZapine (ZYPREXA) 5 MG tablet Take 5 mg by mouth At Bedtime   Unknown, Entered By History       Date completed: 10/29/20    Medication history completed by:     Nicollette McMann, PharmD  [facility]  Emergency Department: Ascom *[phone]

## 2020-10-29 NOTE — TELEPHONE ENCOUNTER
S:  12:45 PM  Call from  ED requesting Detox bed for a 30 YO/transgender  F requesting detox from benzos.    B:  Per ED provider, patient came in to  ED on 10/27/20 requesting detox for benzos, staes sge uses 8mg/day,   but two Utox's were NEG/on-call provider for 3A declined patient-DOC to DOC done and patient  was discharged home from the ED.     Labwork:  Utox and HCG  NEG  COVID-19 not ordered    A:  Vol    R:  Patient cleared and ready for behavioral bed placement: No    Patient placed on wait list-bed reserved, and writer will consult with Intake supervisor for further direction.    R:  2 PM  Per Intake supervisor-present to Dr. Love for admission to 3A    R:  2:45 PM   Dr. Love initially declined patient due to NEG Utox results.  DOC to DOC done-Dr. Love requested Dr. Barahona review [place]  for current prescriptions and notify her of findings before decision is made regarding admission to .      R:  3:15 PM  Writer called [facility]-requested COVID-19 test be initiated-spoke to Dr. Barahona who stated patient has current prescriptions for and Ativan and Klonopin and reports taking both daily.      R:  3:25 PM  Writer is handing off remainder of tentative admission to PM staff to follow up.

## 2020-10-29 NOTE — TELEPHONE ENCOUNTER
ILYA sepulveda/Ivan    - 350PM intake spoke with MD Love. Pt declined by MD Love. MD has done multiple doc-doc regarding pt previously. MD Love instructed intake to provide MD Barahona with pager if further questions. Pt is officially declined for detox and removed from worklist. ED updated and provided pager number if further questions persisted.     - 339PM intake spoke with MD Barahona in [facility] pt is prescribed 1mg ativan up to 4mg ativan daily and 1mg klonopin 3mg daily pt is taking up to 8mg ativan and 5mg klonopin daily per pt.     -435PM update re-discussed case with MD Love and MD Barahona. Pt is accepted due to pt refilling medications on 10/20. At this time still unknown why pt UTOX is negative clonazepam bloodwork ordered and should result tomorrow and covid19 in process.    Attached below is medication req 10/27    Admission Medication History Completed by Pharmacy     See [facility] Admission Navigator for allergy information, preferred outpatient pharmacy, prior to admission medications and immunization status.      Medication history sources:  patient via iPad interview, [employer] dispense history, Care Everywhere ([facility]), [facility] ([phone])     Changes made to PTA medication list (reason)  Added:   - Latuda, benztropine, lorazepam  Deleted:   - Tuvada, fluoxetine (not taking per pt)  Changed:   - estradiol valerate 4 mg q7days-->Depo-estradiol 1.5 mg q6days (per pt, [facility])     Additional medication history information:   - Patient reports taking benztropine once daily HS vs BID as prescribed. When prompted, she stated that she never looked at the directions on the bottle and assumed it was once daily dosing.  - Patient reports taking the following supplements: bovine collagen peptides vitamin, super food vitamin, and biotin.  - Patient denies taking any additional Rx/OTC medications other than the ones discussed above and listed below.            Prior to Admission medications     Medication Sig Last Dose Taking? Auth Provider   benztropine (COGENTIN) 0.5 MG tablet Take 0.5 mg by mouth 2 times daily 10/26/2020 Yes Unknown, Entered By History   estradiol cypionate (DEPO-ESTRADIOL) 5 MG/ML injection Inject 1.5 mg into the muscle every 6 days 10/22/2020 Yes Unknown, Entered By History   LORazepam (ATIVAN) 1 MG tablet Take 1 mg by mouth 4 times daily 10/27/2020 Yes Unknown, Entered By History   lurasidone (LATUDA) 60 MG TABS tablet Take 60 mg by mouth daily (with dinner)  10/26/2020 Yes Unknown, Entered By History   OLANZapine (ZYPREXA) 5 MG tablet Take 5 mg by mouth At Bedtime 10/26/2020 Yes Unknown, Entered By History      Date completed: 10/27/20     Medication history completed by:   Javid Mandel, PharmCATHY  [facility]  Emergency Department: Ascom *[phone]

## 2020-10-29 NOTE — ED PROVIDER NOTES
[facility] EMERGENCY DEPARTMENT ([facility])     October 29, 2020    History     Chief Complaint   Patient presents with     Addiction Problem     Detox: Benzoes: 2-8 daily: last use 15 minutes ago:      CHRYSTAL  Nay Lainez is a 29 year old adult (male to female transgendered individual) w/ PMH of gender dysphoria status post bilateral orchiectomy (2018), schizoaffective disorder, anixety, alcohol use disorder, alcoholic liver cirrhosis, ADHD, eating disorder unspecified (2012) who presents to the Emergency Department for seeking benzodiazepine detox. Patient has history of being in detox on 11/19/2018 for lorazepam and clonazepam abuse. She reports maintaining sobriety until 6 months ago when she was  from her partner. She saw her primary care provider through [facility] and was restarted on lorazepam and clonazepam despite being aware of her prior benzo abuse. Patient reports taking anywhere from 2 - 8 mg of lorazepam and/or 5 mg clonazepam daily. She was feeling foggy and confused and felt she needed to get off her benzos as it is starting to interfere with her functioning. She felt she needed help with detox, needing phenobarb. Patient was seen here 2 days ago, had an available bed but was declined detox due to negative drug screen for benzos. It was still negative when repeated.    Patient has since talked to her clinic where she got a drug screen through their system. She is awaiting for the results. She comes back today accompanied by a friend who confirms that she has been using her benzos. She even brought in her prescription bottles to show that she has been prescribed them.    I inquired as to whether she had talked to her prescriber about her current struggle with the benzos and she has not, citing difficulty with getting an appointment and the process of a slow wean is unacceptable to her. She wants to be done with her benzos now. SHe reports last taking lorazepam yesterday (2 mg)  and clonazepam (5 mg) an hour prior to arrival today.    Patient denies other drugs. She was last screened for COVID here 2 days ago with an assumption for admission. She has bene isolating at home and denies known COVID exposure.      PAST MEDICAL HISTORY:   Past Medical History:   Diagnosis Date     Anxiety      Depressive disorder      Male-to-female transgender person        PAST SURGICAL HISTORY:   Past Surgical History:   Procedure Laterality Date     BREAST SURGERY      augmentation     COSMETIC SURGERY      lip and nose nurgeries     ORCHIECTOMY SCROTAL Bilateral 11/2/2018    Procedure: Bilateral Orchiectomy;  Surgeon: Javid Kendall MD;  Location:  OR       Past medical history, past surgical history, medications, and allergies were reviewed with the patient.     FAMILY HISTORY: No family history on file.    SOCIAL HISTORY:   Social History     Tobacco Use     Smoking status: Current Every Day Smoker     Packs/day: 2.00     Smokeless tobacco: Never Used     Tobacco comment: currently vapes nicotine   Substance Use Topics     Alcohol use: No     Comment: had 4 shots of vodak today     Social history was reviewed with the patient.       Patient's Medications   New Prescriptions    No medications on file   Previous Medications    BENZTROPINE (COGENTIN) 0.5 MG TABLET    Take 0.5 mg by mouth 2 times daily    ESTRADIOL CYPIONATE (DEPO-ESTRADIOL) 5 MG/ML INJECTION    Inject 1.5 mg into the muscle every 6 days    LORAZEPAM (ATIVAN) 1 MG TABLET    Take 1 mg by mouth 4 times daily    LURASIDONE (LATUDA) 60 MG TABS TABLET    Take 60 mg by mouth daily (with dinner)     OLANZAPINE (ZYPREXA) 5 MG TABLET    Take 5 mg by mouth At Bedtime   Modified Medications    No medications on file   Discontinued Medications    No medications on file        No Known Allergies     Review of Systems   Constitutional: Negative.    HENT: Negative.    Eyes: Negative.    Respiratory: Negative.    Cardiovascular: Negative.     Gastrointestinal: Negative.    Genitourinary: Negative.    Musculoskeletal: Negative.    Skin: Negative.    Neurological: Negative.    Psychiatric/Behavioral: Positive for decreased concentration. Negative for hallucinations and suicidal ideas. The patient is nervous/anxious. The patient is not hyperactive.    All other systems reviewed and are negative.        Physical Exam   BP: 115/78  Pulse: 95  Temp: 95.7  F (35.4  C)  Resp: 12  SpO2: 97 %      Physical Exam  Vitals signs and nursing note reviewed.   HENT:      Head: Normocephalic.   Eyes:      Pupils: Pupils are equal, round, and reactive to light.   Neck:      Musculoskeletal: Normal range of motion.   Pulmonary:      Effort: Pulmonary effort is normal.   Musculoskeletal: Normal range of motion.   Neurological:      General: No focal deficit present.      Mental Status: She is alert and oriented to person, place, and time.   Psychiatric:         Attention and Perception: Attention and perception normal. She does not perceive auditory or visual hallucinations.         Mood and Affect: Mood and affect normal.         Speech: Speech normal.         Behavior: Behavior normal. Behavior is not agitated, aggressive, hyperactive or combative. Behavior is cooperative.         Thought Content: Thought content normal. Thought content is not paranoid or delusional. Thought content does not include homicidal or suicidal ideation.         Cognition and Memory: Cognition and memory normal.         Judgment: Judgment normal.         ED Course        Procedures               No results found for this or any previous visit (from the past 24 hour(s)).  Medications - No data to display          Assessments & Plan (with Medical Decision Making)   Patient with gender dysphoria and schizoaffective disorder who relapsed on sedative hypnotics (lorazepam and clonazepam) 6 months ago. She has progressively been escalating in her use and now feels impacted by it with fogginess and  decreased ability to function. Patient meets criteria for detox. She is referred. There is a pending bed availability.    Drug screens from [facility] yesterday and from here today have both returned negative.    [place]  show ongoing RX for both lorazepam 1 mg QID prn and clonazepam 1 mg TID prn since March 2020. Last prescription filled for clonazepam was on 10/1/2020, #90. There are a few pills left in her bottle. Last lorazepam refill was on 10/20/2020, #120. There are many pills left in the bottle. This evidence appears to weigh in favor of patient taking benzos and therefore would benefit from help with detox.    I decided to draw a clonazepam level and metabolite to determine its presence in her system. Unfortunately the results will not be available for 24-48 hours.    I discussed the case with Dr Love. She is reviewing the case.    I have reviewed the nursing notes.    I have reviewed the findings, diagnosis, plan and need for follow up with the patient.    New Prescriptions    No medications on file       Final diagnoses:   Sedative, hypnotic or anxiolytic dependence (H)   History of schizoaffective disorder   Gender dysphoria       10/29/2020   [facility] EMERGENCY DEPARTMENT     Petr Joy MD  10/29/20 1331       Petr Joy MD  10/29/20 8952

## 2020-10-30 LAB
7AMINOCLONAZEPAM BLD-MCNC: <10 NG/ML
CLONAZEPAM SERPL CFM-MCNC: 25 NG/ML (ref 20–70)
LABORATORY COMMENT REPORT: NORMAL
SARS-COV-2 RNA SPEC QL NAA+PROBE: NEGATIVE
SPECIMEN SOURCE: NORMAL
TSH SERPL DL<=0.005 MIU/L-ACNC: 0.84 MU/L (ref 0.4–4)

## 2020-10-30 PROCEDURE — 128N000004 HC R&B CD ADULT

## 2020-10-30 PROCEDURE — HZ2ZZZZ DETOXIFICATION SERVICES FOR SUBSTANCE ABUSE TREATMENT: ICD-10-PCS | Performed by: PSYCHIATRY & NEUROLOGY

## 2020-10-30 PROCEDURE — 99207 PR CONSULT E&M CHANGED TO INITIAL LEVEL: CPT | Performed by: PHYSICIAN ASSISTANT

## 2020-10-30 PROCEDURE — 250N000013 HC RX MED GY IP 250 OP 250 PS 637: Performed by: PSYCHIATRY & NEUROLOGY

## 2020-10-30 PROCEDURE — 99223 1ST HOSP IP/OBS HIGH 75: CPT | Mod: GT | Performed by: PSYCHIATRY & NEUROLOGY

## 2020-10-30 PROCEDURE — 250N000013 HC RX MED GY IP 250 OP 250 PS 637: Performed by: NURSE PRACTITIONER

## 2020-10-30 PROCEDURE — 36415 COLL VENOUS BLD VENIPUNCTURE: CPT | Performed by: NURSE PRACTITIONER

## 2020-10-30 PROCEDURE — 99222 1ST HOSP IP/OBS MODERATE 55: CPT | Performed by: PHYSICIAN ASSISTANT

## 2020-10-30 PROCEDURE — 84443 ASSAY THYROID STIM HORMONE: CPT | Performed by: NURSE PRACTITIONER

## 2020-10-30 PROCEDURE — 80061 LIPID PANEL: CPT | Performed by: NURSE PRACTITIONER

## 2020-10-30 RX ORDER — PHENOBARBITAL 32.4 MG/1
32.4 TABLET ORAL 3 TIMES DAILY
Status: DISCONTINUED | OUTPATIENT
Start: 2020-10-30 | End: 2020-11-02

## 2020-10-30 RX ADMIN — BENZTROPINE MESYLATE 0.5 MG: 0.5 TABLET ORAL at 20:46

## 2020-10-30 RX ADMIN — LURASIDONE HYDROCHLORIDE 60 MG: 20 TABLET, FILM COATED ORAL at 18:49

## 2020-10-30 RX ADMIN — COCOA BUTTER, PHENYLEPHRINE HYDROCHLORIDE 1 SUPPOSITORY: 2211; 6.25 SUPPOSITORY RECTAL at 20:58

## 2020-10-30 RX ADMIN — PHENOBARBITAL 32.4 MG: 32.4 TABLET ORAL at 14:17

## 2020-10-30 RX ADMIN — NICOTINE 1 PATCH: 21 PATCH, EXTENDED RELEASE TRANSDERMAL at 09:17

## 2020-10-30 RX ADMIN — PHENOBARBITAL 32.4 MG: 32.4 TABLET ORAL at 20:46

## 2020-10-30 RX ADMIN — OLANZAPINE 5 MG: 5 TABLET, FILM COATED ORAL at 20:46

## 2020-10-30 RX ADMIN — PHENOBARBITAL 32.4 MG: 32.4 TABLET ORAL at 09:17

## 2020-10-30 ASSESSMENT — ACTIVITIES OF DAILY LIVING (ADL)
HYGIENE/GROOMING: INDEPENDENT
DRESS: INDEPENDENT
LAUNDRY: WITH SUPERVISION
ORAL_HYGIENE: INDEPENDENT

## 2020-10-30 NOTE — PROGRESS NOTES
Writer met with pt to discuss aftercare plans. Pt reports she is here for detox only. Reports recent divorce with  and this led to relapse of benzodiazepines. Pt reports she plans to return home, back to work, and attending 12 step support group meetings. Pt is aware if needs change she can seek out writer for further assistance. Pt stated she was tired and was going to try to sleep for the afternoon. AVS initiated.

## 2020-10-30 NOTE — PLAN OF CARE
Problem: Substance Withdrawal  Intervention: Substance Withdrawal  Recent Flowsheet Documentation  Taken 10/30/2020 1400 by Sintia Torres RN  Substance Withdrawal Interventions:   interventions implemented as appropriate   monitor substance withdrawal process   encourage nutrition and hydration   assist patient in completing CD Evaluation / Rule 25 Evaluation   assess patient response to medication       Pt continues to be monitored for benzodiazepine withdrawal. Pt is now on phenobarbital 32.4 mg TID. Pt has been in room pending covid results which have not come back negative. Pt is now out on unit. Social with peers.   Pt affect bright. Pt denies depression or anxiety.   Appetite good.   Discharge plans pending.   Monitor for sedation related to phenobarbital. Pt denies sedation this shift.     BP low in am. Fluids provided and encouraged.        numerical 0-10

## 2020-10-30 NOTE — H&P
Admitted:     10/29/2020      IDENTIFYING INFORMATION:  The patient is a 29-year-old transgender male-to-female.  Patient has a complex psychiatric history of schizoaffective disorder, history of alcohol use disorder.  The patient is status post bilateral orchiectomy.        HISTORY OF PRESENT ILLNESS:  Patient presented to the emergency room wanting detox from benzos.   The patient was in the emergency room twice.  The first time the patient was in the emergency room was 10/27, patient was discharged because patient did not have any symptoms of withdrawal and her U-tox was negative.  She came back yesterday again to the emergency room because patient was continued to feel confusion.  She says she has been abusing benzos for the past 2 months.  She cannot stop, she tried to stop.  She is having physical symptoms.  She feels shaky, sweaty, throwing up and feeling confused.  According to the emergency room, the patient has been feeling foggy and confused and it is starting to interfere with her functioning.      The patient U-tox has been repeated since then a couple of times, but it continues to be negative.  The patient insists that she has been using, she has brought her prescription bottles.  She reported to the emergency room doctor that she took lorazepam on 10/28 and took clonazepam on 10/29.  She is prescribed Ativan 1 mg 4 times a day but in the last 2 months, she is taking 2-6 mg.  She has been prescribed clonazepam in the past, it is not prescribed presently, and she has been taking 5 mg.  She has progressive use, loss of control, tried to quit unsuccessfully.  She denies any drug use, denies alcohol, cocaine and meth.  She smokes 1 pack a day.  The patient has a history of schizophrenia.  She takes Latuda and Zyprexa.  She says this is better.  She has auditory hallucinations, paranoia.  She has delusions of reference, delusions of persecutions, delusions of control.  She denies any depression, denies any  solitario, denies any suicidal ideation, denies any PTSD.      PAST PSYCHIATRIC HISTORY:  The patient was psychiatrically hospitalized over 10 years.  She is transgender.  Her initial transition was at age 21.  She is scheduled for sexual reassignment surgery in January.  She was in treatment in [facility] in January.      PAST MEDICAL HISTORY:  A 10-point review systems was reviewed and is negative.        VITAL SIGNS:  Blood pressure of 105/69, pulse is 80, respiratory rate of 16, temperature of 98.      FAMILY HISTORY:  Denied any family psychiatric or chemical dependency history.      SOCIAL HISTORY:  She works in a coffee shop.  She lives by herself.  She does not have any children.  She recently went through a divorce.        MENTAL STATUS EXAMINATION:  The patient is a 29-year-old transgender.  Patient has numerous tattoos and jewelry on her.  The patient is awake, alert, oriented, cooperative, good eye contact.  Mood is described as good.  Affect is congruent.  Speech is spontaneous, normal in rate and volume.  Linear thought process.  No evidence of tardive dyskinesia.  No loose association.  Does not have any suicidal ideation, plan or intent.  Insight and judgment are fair.      Alert and oriented x3.  Attention, concentration is intact.  Recent and remote memory intact.  Language and fund of knowledge intact.  Normal muscle strength, normal gait.    VITAL SIGNS:  Blood pressure of 105/69, pulse is 80, respiratory rate of 16, temperature of 98.   DIAGNOSES:   Axis I:   1.  Benzodiazepine use disorder, moderate.   2.  Schizoaffective disorder  Gender dysphoria  PLAN:  The patient's past PDMP shows that she has been prescribed benzos but her drug screen is consistently negative.  She is feeling shaky, sweaty, throwing up, feeling confused.  .  She was given a very low dose of phenobarbital, we will go with 30 mg 3 times a day.  This can be adjusted up or down based on patient's sedation.  Yesterday, the patient  had 60 mg and felt very sedated.     She will be continued on Latuda and Zyprexa for her schizoaffective disorder schizophrenia    She was also continued on Cogentin    Patient smokes 1 pack a day she was advised to quit and nicotine replacement patch ordered nicotine cessation counseling given to patient  For her gender dysphoria she was continued on her medications estradiol injection when it is scheduled on 11/ 3       The patient will be seen by case management and Internal Medicine.  I had a chance to review the labs with the patient and talked about this with the patient.  I have summarized and looked at old records including medication reconciliation, the patient's home medications, PDMP.  I spoke with the nurse about medications and withdrawal scores.  I have spoken with the  about treatment options.  The patient is just interested in detox.  The patient's U-tox is negative and we will monitor.         PRINCESS DAVILA MD             D: 10/30/2020   T: 10/30/2020   MT: PK      Name:     ROSY DIEHL   MRN:      [id number]        Account:      [id number]   :      1990        Admitted:     10/29/2020                   Document: [id number]       cc: Ventura Morelos DO

## 2020-10-30 NOTE — H&P
Telemedicine Visit: The patient's condition can be safely assessed and treated via synchronous audio and visual telemedicine encounter.   Start Time: 8.28  Stop Time: 8.41  Reason for Telemedicine Visit: Covid-19   Originating Site (Patient Location): [facility]  Distant Site (Provider Location): Provider Remote Setting   Consent: The patient/guardian has verbally consented to: the potential risks and benefits of telemedicine (video visit) versus in person care; bill my insurance or make self-payment for services provided; and responsibility for payment of non-covered services.   Mode of Communication: Video Conference via polycom  As the provider I attest to compliance with applicable laws and regulations related to telemedicine.       The patient/guardian has been notified of the following:   This telemedicine visit is conducted live between you and your clinician. We have found that certain health care needs can be provided without the need for a physical exam. This service lets us provide the care you need with a telemedicine conversation.          Patient has severe exacerbationof his chronic alcoholism  , he been unable to stop using drugs in the community due to both physical and psychological symptoms.  Continued use will put the patient at risk for medical and/or psychiatric complications.   I HAVE REVIEWED LABS WITH PT AND TALKED ABOUT RESULTS WITH PT  I HAVE REVIEWED AND SUMMARIZED OLD RECORDS including his medication reconcilation of his home medications  and PDMP   I HAVE SPOKEN WITH RN ABOUT MEDICATIONS AND withdrawl SCORES  I HAVE SPOKEN WITH CM ABOUT PTS TREATMETN OPTIONS
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
## 2020-10-30 NOTE — PROGRESS NOTES
[place] Prescription Drug Control Monitoring Note:      NARX SCORES  Narcotic  160  Sedative  402  Stimulant  000  Explanation and Guidance  OVERDOSE RISK SCORE     180    (Range 000-999)  Explanation and Guidance  ADDITIONAL RISK INDICATORS ( 0 )     Explanation and Guidance   This NarxCare report is based on search criteria supplied and the data entered by the dispensing pharmacy. For more information about any prescription, please contact the dispensing pharmacy or the prescriber. NarxCare scores and reports are intended to aid, not replace, medical decision making. None of the information presented should be used as sole justification for providing or refusing to provide medications. The information on this report is not warranted as accurate or complete.   Graphs   RX GRAPH  Narcotic Buprenorphine Sedative Stimulant Other     Prescribers    1 - Leatha NATALIE Ventura    Timeline  10/30  2m  6m  1y  2y    Buprenorphine mg    16    4    0  28    Timeline  10/30  2m  6m  1y  2y  Morphine MgEq (MME)    200    80    0  320    Timeline  10/30  2m  6m  1y  2y  Lorazepam MgEq (LME)    10    2    0  18    Timeline  10/30  2m  6m  1y  2y    *Per CDC guidance, the MME conversion factors prescribed or provided as part of the medication-assisted treatment for opioid use disorder should not be used to benchmark against dosage thresholds meant for opioids prescribed for pain. Buprenorphine products have no agreed upon morphine equivalency, and as partial opioid agonists, are not expected to be associated with overdose risk in the same dose-dependent manner as doses for full agonist opioids. MME = morphine milligram equivalents. LME = Lorazepam milligram equivalents. mg = dose in milligrams.     Summary     Summary  Total Prescriptions:     10   Total Prescribers:     1   Total Pharmacies:     2   Narcotics* (excluding Buprenorphine)  Current Qty:     0   Current MME/day:     0.00  30 Day Avg MME/day:     0.00    Sedatives*  Current Qty:     76   Current LME/day:    10.00  30 Day Avg LME/day:    7.73  Buprenorphine*  Current Qty:     0   Current mg/day:    0.00  30 Day Avg mg/day:    0.00  Rx Data   PRESCRIPTIONS  Total Prescriptions:  10  Total Private Pay:  1  Fill Date  ID  Written  Sold  Drug  Qty  Days  Prescriber  Rx #  Pharmacy  Refill  Daily Dose *  Pymt Type    10/20/2020   2   10/20/2020   10/21/2020   Lorazepam 1 Mg Tablet   120.00  30  [facility]   [id number]   0/2  4.00 LME  [place]  10/01/2020   2   08/05/2020   10/01/2020   Clonazepam 1 Mg Tablet   90.00  30  [facility]   [id number]   [facility]   2/3  6.00 L[place]  09/03/2020   2   08/05/2020 09/03/2020   Clonazepam 1 Mg Tablet   90.00  30  [facility]   [id number]   [facility]   1/3  6.00 L[place]  08/05/2020   1   08/05/2020 08/05/2020   Clonazepam 1 Mg Tablet   90.00  30  [facility]   [id number]   [facility] ([facility]   0/3  6.00 [place]  07/28/2020   1   06/29/2020 07/28/2020   Lorazepam 1 Mg Tablet   90.00  30  [facility]   [id number]   [facility] ([facility]   1/4  3.00 [place]  06/29/2020   1   06/29/2020 06/29/2020   Lorazepam 1 Mg Tablet   90.00  30  [facility]   [id number]   [facility] ([facility]   0/4  3.00 [place]  05/28/2020   1   05/28/2020 05/28/2020   Lorazepam 1 Mg Tablet   90.00  30  [facility]   [id number]   [facility] ([facility]   0/0  3.00 [place]  05/05/2020   1   03/09/2020 05/05/2020   Clonazepam 1 Mg Tablet   90.00  30  [facility]   [id number]   [facility] ([facility]   2/2  6.00 [place]  04/05/2020   1   03/09/2020 04/05/2020   Clonazepam 1 Mg Tablet   90.00  30  [facility]   [id number]   [facility] (0123[facility]   1/2  6.00 [place]  03/09/2020   1   03/09/2020 03/09/2020   Clonazepam 1 Mg Tablet   90.00  30  [facility]   [id number]   [facility] (0123)   0/2  6.00 LME  [place]  *Per CDC guidance, the MME conversion factors prescribed or provided as part of the medication-assisted treatment for opioid use disorder should not be used to benchmark  against dosage thresholds meant for opioids prescribed for pain. Buprenorphine products have no agreed upon morphine equivalency, and as partial opioid agonists, are not expected to be associated with overdose risk in the same dose-dependent manner as doses for full agonist opioids. MME = morphine milligram equivalents. LME = Lorazepam milligram equivalents. mg = dose in milligrams.     Providers  Total Providers: 1   Name   Address   City   State   Zipcode   Phone   Leatha Trujillo, Claude  [address]   [place]  [address]  [phone]  Pharmacies  Total Pharmacies: 2   Name   Address   City   State   Zipcode   Phone   [email or web address] [phone] [address]   [facility]  [address]  [phone]  [facility] [phone]) [address]  [address]  [phone]    The report provided is based upon the search criteria entered and the corresponding data as it has been reported by dispenser(s). If erroneous information is identified or additional information is needed, please contact the dispenser or the prescriber provided on the report. Date Sold signifies the date the prescription was sold (left the pharmacy). The absence of Date Sold does not necessarily indicate the prescription was not dispensed. Fill Date represents the date the medication was filled or prepared by the pharmacy. Note, federal regulation (CFR Title 42: Part 2) requires patient consent prior to releasing certain patient data from federally funded opioid treatment programs (OTPs). As such, controlled substances dispensed from OTPs for medication-assisted treatment may not appear in the [place]  report. Morphine milligram equivalent (MME) conversion factors published by the CDC are used in the MME calculation. Per the CDC, the MME conversion factor is intended only for analytic purposes where prescription data are used to retrospectively calculate daily MME to inform analyses of risks associated with opioid  prescribing. This value does not constitute clinical guidance or recommendations for converting patients from one form of opioid analgesic to another. Per the CDC, the conversion factors for drugs prescribed or provided, as part of medication-assisted treatment for opioid use disorder should not be used to benchmark against MME dosage thresholds meant for opioids prescribed for pain. Buprenorphine products listed in the CDC s MME file do not have an associated conversion factor. Lastly, the CDC notes, in clinical practice, calculating MME for methadone often involves a sliding-scale approach, whereby the conversion factor increases with increasing dose. The conversion factor of 3 for methadone presented in this file could underestimate MME for a given patient. This report contains confidential information, including patient identifiers, and is not a public record. The information on this report must be treated as protected health information and is only to be disclosed to others as authorized by applicable state and Federal regulations.

## 2020-10-30 NOTE — CONSULTS
[facility]   Consult Note - Hospitalist Service     Date of Admission:  10/29/2020  Consult Requested by: Rashmi Love MD  Reason for Consult: Medical Evaluation, Co-management of alcohol withdrawal      Assessment & Plan   Nay Lainez is a 29 year old adult (male to female transgendered individual) w/ PMHx of gender dysphoria status post bilateral orchiectomy (2018), schizoaffective disorder, anixety, alcohol use disorder admitted on 10/29/2020 for benzodiazepine detox.    #Benzodiazepine dependence and withdrawal  Patient reports taking 2-8 mg Lorazepam and 5 mg clonazepam daily.   -Agree with phenobarbital taper, management per psychiatry    #Schizoaffective disorder   PTA luradidone 60 mg daily, zyprexa 5 mg daily, and cogentin 0.5 mg BID.   -Management per psychiatry     #Gender dysphoria   PTA Estradiol cypionate 5 mg/mL 1.5 mg IM injection every 6 days, last taken on Wednesday 10/28/2020.   -Continue PTA Estradiol IM injections every 6 days, next due on 11/3. Patient brought in own medication to use.     #Tobacco dependence   Smokes 1 ppd of cigarettes daily. Agree with nicotine patch 21 mg/hr.        Medicine will sign off. No further recommendations at this time. Please page the on-call [facility] for any intercurrent medical issues which arise.     Maryana Bennett PA-C   Hospitalist Service  [phone]  [facility]     ______________________________________________________________________    Chief Complaint   General Medicine Evaluation     History is obtained from the patient    History of Present Illness   Nay Lainez is a 29 year old adult (male to female transgendered individual) w/ PMHx of gender dysphoria status post bilateral orchiectomy (2018), schizoaffective disorder, anixety, alcohol use disorder admitted on 10/29/2020 for benzodiazepine detox.    Patient states she is feeling very tired today. BP  this morning 78/48. Denies any dizziness or lightheadedness. Repeat BP during interview at 105/69. No other complaints. States she was taking 2-6 mg of ativan daily, last taken on Wednesday, and occasionally clonazepam 5 mg, last on Thursday. Patient states she relapsed after her divorce 2 months ago. Was drinking alcohol for about a week, but denies any recent use.     Endorses chronic dry cough, which she presumes is from smoking use. Asking for nicotine patch.     Denies any F/C, CP, SOB, N/V/D/C, abdominal pain, dysuria, hematuria, rashes or LE edema.     Review of Systems   The 10 point Review of Systems is negative other than noted in the HPI or here.     Past Medical History    I have reviewed this patient's medical history and updated it with pertinent information if needed.   Past Medical History:   Diagnosis Date     Anxiety      Depressive disorder      Male-to-female transgender person        Past Surgical History   I have reviewed this patient's surgical history and updated it with pertinent information if needed.  Past Surgical History:   Procedure Laterality Date     BREAST SURGERY      augmentation     COSMETIC SURGERY      lip and nose nurgeries     ORCHIECTOMY SCROTAL Bilateral 11/2/2018    Procedure: Bilateral Orchiectomy;  Surgeon: Javid Kendall MD;  Location:  OR       Social History   I have reviewed this patient's social history and updated it with pertinent information if needed.  Social History     Tobacco Use     Smoking status: Current Every Day Smoker     Packs/day: 2.00     Smokeless tobacco: Never Used     Tobacco comment: currently vapes nicotine   Substance Use Topics     Alcohol use: No     Comment: had 4 shots of vodak today     Drug use: No     Comment: every few days       Family History   I have reviewed this patient's family history and updated it with pertinent information if needed.   Family History   Problem Relation Age of Onset     Cancer No family hx of      Coronary  Artery Disease No family hx of      Medications   I have reviewed this patient's current medications  Current Facility-Administered Medications   Medication     acetaminophen (TYLENOL) tablet 650 mg     alum & mag hydroxide-simethicone (MAALOX  ES) suspension 30 mL     benztropine (COGENTIN) tablet 0.5 mg     bisacodyl (DULCOLAX) Suppository 10 mg     [START ON 11/3/2020] estradiol cypionate (DEPO-ESTRADIOL) 5 MG/ML injection 1.5 mg     lurasidone (LATUDA) tablet 60 mg     magnesium hydroxide (MILK OF MAGNESIA) suspension 30 mL     nicotine (NICODERM CQ) 21 MG/24HR 24 hr patch 1 patch     nicotine Patch in Place     OLANZapine (zyPREXA) tablet 5 mg     ondansetron (ZOFRAN-ODT) ODT tab 4 mg     PHENobarbital (LUMINAL) tablet 32.4 mg     phenylephrine-cocoa butter (PREPARATION H) per suppository 1 suppository       Allergies   No Known Allergies    Physical Exam   Vital Signs: Temp: 98  F (36.7  C) Temp src: Temporal BP: 105/69 Pulse: 80   Resp: 16 SpO2: 96 % O2 Device: None (Room air)    Weight: 133 lbs 0 oz  GENERAL: Alert and oriented x 3. NAD. Pleasant and conversational   HEENT: Anicteric sclera. EOMI. Wearing mask.   NECK: Trachea midline.   CARDIOVASCULAR: RRR. S1, S2. No murmurs, rubs, or gallops.   RESPIRATORY: Effort normal on RA. Clear to auscultation bilaterally, no rales, rhonchi or wheezes, respirations unlabored   GI: Abdomen soft, non-tender abdomen without rebound or guarding, normoactive bowel sounds present  EXTREMITIES: No peripheral edema.No calf asymmetry, erythema, or tenderness.   NEUROLOGICAL: No focal deficits. CN II-XII grossly intact. Moving all extremities symmetrically.   SKIN: Intact. Warm and dry. No rashes or lesions.  No jaundice. Multiple tattoos on face, neck and extremities.     Data   Results for orders placed or performed during the hospital encounter of 10/29/20 (from the past 24 hour(s))   Drug abuse screen 6 urine (tox)   Result Value Ref Range    Amphetamine Qual Urine  Negative NEG^Negative    Barbiturates Qual Urine Negative NEG^Negative    Benzodiazepine Qual Urine Negative NEG^Negative    Cannabinoids Qual Urine Negative NEG^Negative    Cocaine Qual Urine Negative NEG^Negative    Ethanol Qual Urine Negative NEG^Negative    Opiates Qualitative Urine Negative NEG^Negative   HCG qualitative urine   Result Value Ref Range    HCG Qual Urine Negative NEG^Negative   Clonazepam and metabolite   Result Value Ref Range    Clonazepam Level 25 20 - 70 ng/mL    7 Aminoclonazepam <10 NOT ESTABLISHED ng/mL   Asymptomatic COVID-19 Virus (Coronavirus) by PCR    Specimen: Nasopharyngeal   Result Value Ref Range    COVID-19 Virus PCR to [facility] - Source Nasopharyngeal     COVID-19 Virus PCR to [facility] - Result       Test received-See reflex to IDDL test SARS CoV2 (COVID-19) Virus RT-PCR   Lipid panel   Result Value Ref Range    Cholesterol 145 <200 mg/dL    Triglycerides 91 <150 mg/dL    HDL Cholesterol 46 >39 mg/dL    LDL Cholesterol Calculated 81 <100 mg/dL    Non HDL Cholesterol 99 <130 mg/dL   TSH with free T4 reflex and/or T3 as indicated   Result Value Ref Range    TSH 0.84 0.40 - 4.00 mU/L

## 2020-10-30 NOTE — PLAN OF CARE
Behavioral Team Discussion: (10/30/2020)    Continued Stay Criteria/Rationale: Patient admitted for Chemical Use Issues.  Plan: The following services will be provided to the patient; psychiatric assessment, medication management, therapeutic milieu, individual and group support, and skills groups.   Participants: 3A Provider: Dr. Rashmi Love MD; 3A RN's: Sintia Torres, RN; 3A CM's: Loretta Coates.  Summary/Recommendation: Providers will assess today for treatment recommendations, discharge planning, and aftercare plans. CM will meet with pt for discharge planning.   Medical/Physical: Per ED note:    Anxiety       Depressive disorder       Male-to-female transgender person      Precautions:   Behavioral Orders   Procedures     Code 1 - Restrict to Unit     Routine Programming     As clinically indicated     Status 15     Every 15 minutes.     Withdrawal precautions     Rationale for change in precautions or plan: N/A  Progress: Initial.

## 2020-10-30 NOTE — PROGRESS NOTES
10/29/20 2014   Patient Belongings   Did you bring any home meds/supplements to the hospital?  Yes   Disposition of meds  Sent to security/pharmacy per site process   Patient Belongings other (see comments)   Patient Belongings Put in Hospital Secure Location (Security or Locker, etc.) other (see comments)   Belongings Search Yes   Clothing Search Yes   Second Staff Sintia Ellsworth   Comment See Notes     Large bin:  Winter jacket, winter boots, notebook, tote gag w/tan tops, sweat pants w/string, sharps(Cig, keys, lighter)  Small bin:  Phone, , wallet  Security # [id number]  Visa([id number])  Master ([id number])  [place] ID  Medication # [id number] pt nurse  Lorazepam 1 mg 72 tabs  Clonazepam 1mg 3 tabs  A               Admission:  I am responsible for any personal items that are not sent to the safe or pharmacy.  [facility] is not responsible for loss, theft or damage of any property in my possession.    Signature:  _________________________________ Date: _______  Time: _____                                              Staff Signature:  ____________________________ Date: ________  Time: _____      2nd Staff person, if patient is unable/unwilling to sign:    Signature: ________________________________ Date: ________  Time: _____     Discharge:  [facility] has returned all of my personal belongings:    Signature: _________________________________ Date: ________  Time: _____                                          Staff Signature:  ____________________________ Date: ________  Time: _____

## 2020-10-31 PROCEDURE — 250N000013 HC RX MED GY IP 250 OP 250 PS 637: Performed by: NURSE PRACTITIONER

## 2020-10-31 PROCEDURE — 250N000013 HC RX MED GY IP 250 OP 250 PS 637: Performed by: PSYCHIATRY & NEUROLOGY

## 2020-10-31 PROCEDURE — 128N000004 HC R&B CD ADULT

## 2020-10-31 RX ADMIN — NICOTINE 1 PATCH: 21 PATCH, EXTENDED RELEASE TRANSDERMAL at 08:17

## 2020-10-31 RX ADMIN — OLANZAPINE 5 MG: 5 TABLET, FILM COATED ORAL at 21:06

## 2020-10-31 RX ADMIN — LURASIDONE HYDROCHLORIDE 60 MG: 20 TABLET, FILM COATED ORAL at 17:34

## 2020-10-31 RX ADMIN — BENZTROPINE MESYLATE 0.5 MG: 0.5 TABLET ORAL at 21:20

## 2020-10-31 RX ADMIN — PHENOBARBITAL 32.4 MG: 32.4 TABLET ORAL at 08:17

## 2020-10-31 RX ADMIN — PHENOBARBITAL 32.4 MG: 32.4 TABLET ORAL at 13:22

## 2020-10-31 RX ADMIN — PHENOBARBITAL 32.4 MG: 32.4 TABLET ORAL at 21:06

## 2020-10-31 RX ADMIN — COCOA BUTTER, PHENYLEPHRINE HYDROCHLORIDE 1 SUPPOSITORY: 2211; 6.25 SUPPOSITORY RECTAL at 18:45

## 2020-10-31 ASSESSMENT — ACTIVITIES OF DAILY LIVING (ADL)
ORAL_HYGIENE: INDEPENDENT
HYGIENE/GROOMING: INDEPENDENT
DRESS: INDEPENDENT
LAUNDRY: WITH SUPERVISION

## 2020-10-31 NOTE — PLAN OF CARE
Pt continues to be monitored for benzodiazepine withdrawal. Pt's MSSA scores this shift 3 and 2. Pt ate well, states that she slept well, pt took a nap and socialized with peers in [facility] [place]. Denies depression, anxiety and SI.

## 2020-11-01 PROCEDURE — 128N000004 HC R&B CD ADULT

## 2020-11-01 PROCEDURE — 250N000013 HC RX MED GY IP 250 OP 250 PS 637: Performed by: PSYCHIATRY & NEUROLOGY

## 2020-11-01 PROCEDURE — 250N000013 HC RX MED GY IP 250 OP 250 PS 637: Performed by: NURSE PRACTITIONER

## 2020-11-01 RX ORDER — TRAZODONE HYDROCHLORIDE 50 MG/1
50 TABLET, FILM COATED ORAL
Status: DISCONTINUED | OUTPATIENT
Start: 2020-11-01 | End: 2020-11-02

## 2020-11-01 RX ORDER — HYDROXYZINE HYDROCHLORIDE 25 MG/1
25 TABLET, FILM COATED ORAL EVERY 6 HOURS PRN
Status: DISCONTINUED | OUTPATIENT
Start: 2020-11-01 | End: 2020-11-03 | Stop reason: HOSPADM

## 2020-11-01 RX ADMIN — TRAZODONE HYDROCHLORIDE 50 MG: 50 TABLET ORAL at 21:12

## 2020-11-01 RX ADMIN — LURASIDONE HYDROCHLORIDE 60 MG: 20 TABLET, FILM COATED ORAL at 17:32

## 2020-11-01 RX ADMIN — NICOTINE 1 PATCH: 21 PATCH, EXTENDED RELEASE TRANSDERMAL at 08:57

## 2020-11-01 RX ADMIN — COCOA BUTTER, PHENYLEPHRINE HYDROCHLORIDE 1 SUPPOSITORY: 2211; 6.25 SUPPOSITORY RECTAL at 17:46

## 2020-11-01 RX ADMIN — COCOA BUTTER, PHENYLEPHRINE HYDROCHLORIDE 1 SUPPOSITORY: 2211; 6.25 SUPPOSITORY RECTAL at 10:50

## 2020-11-01 RX ADMIN — PHENOBARBITAL 32.4 MG: 32.4 TABLET ORAL at 13:35

## 2020-11-01 RX ADMIN — PHENOBARBITAL 32.4 MG: 32.4 TABLET ORAL at 08:58

## 2020-11-01 RX ADMIN — OLANZAPINE 5 MG: 5 TABLET, FILM COATED ORAL at 21:12

## 2020-11-01 RX ADMIN — TRAZODONE HYDROCHLORIDE 50 MG: 50 TABLET ORAL at 22:06

## 2020-11-01 RX ADMIN — PHENOBARBITAL 32.4 MG: 32.4 TABLET ORAL at 19:58

## 2020-11-01 RX ADMIN — BENZTROPINE MESYLATE 0.5 MG: 0.5 TABLET ORAL at 21:12

## 2020-11-01 RX ADMIN — COCOA BUTTER, PHENYLEPHRINE HYDROCHLORIDE 1 SUPPOSITORY: 2211; 6.25 SUPPOSITORY RECTAL at 13:38

## 2020-11-01 ASSESSMENT — ACTIVITIES OF DAILY LIVING (ADL)
DRESS: INDEPENDENT
HYGIENE/GROOMING: INDEPENDENT
ORAL_HYGIENE: INDEPENDENT
ORAL_HYGIENE: INDEPENDENT
DRESS: INDEPENDENT
LAUNDRY: WITH SUPERVISION
HYGIENE/GROOMING: INDEPENDENT

## 2020-11-02 PROCEDURE — 99232 SBSQ HOSP IP/OBS MODERATE 35: CPT | Mod: GT | Performed by: PSYCHIATRY & NEUROLOGY

## 2020-11-02 PROCEDURE — 128N000004 HC R&B CD ADULT

## 2020-11-02 PROCEDURE — 250N000013 HC RX MED GY IP 250 OP 250 PS 637: Performed by: NURSE PRACTITIONER

## 2020-11-02 PROCEDURE — 250N000013 HC RX MED GY IP 250 OP 250 PS 637: Performed by: PSYCHIATRY & NEUROLOGY

## 2020-11-02 RX ORDER — PHENOBARBITAL 32.4 MG/1
32.4 TABLET ORAL 2 TIMES DAILY
Status: DISCONTINUED | OUTPATIENT
Start: 2020-11-02 | End: 2020-11-03 | Stop reason: HOSPADM

## 2020-11-02 RX ORDER — TRAZODONE HYDROCHLORIDE 50 MG/1
100-150 TABLET, FILM COATED ORAL
Status: DISCONTINUED | OUTPATIENT
Start: 2020-11-02 | End: 2020-11-03 | Stop reason: HOSPADM

## 2020-11-02 RX ADMIN — HYDROXYZINE HYDROCHLORIDE 25 MG: 25 TABLET, FILM COATED ORAL at 20:37

## 2020-11-02 RX ADMIN — PHENOBARBITAL 32.4 MG: 32.4 TABLET ORAL at 08:36

## 2020-11-02 RX ADMIN — TRAZODONE HYDROCHLORIDE 150 MG: 50 TABLET ORAL at 20:36

## 2020-11-02 RX ADMIN — PHENOBARBITAL 32.4 MG: 32.4 TABLET ORAL at 20:36

## 2020-11-02 RX ADMIN — BENZTROPINE MESYLATE 0.5 MG: 0.5 TABLET ORAL at 20:37

## 2020-11-02 RX ADMIN — NICOTINE 1 PATCH: 21 PATCH, EXTENDED RELEASE TRANSDERMAL at 08:37

## 2020-11-02 RX ADMIN — LURASIDONE HYDROCHLORIDE 60 MG: 20 TABLET, FILM COATED ORAL at 18:09

## 2020-11-02 RX ADMIN — OLANZAPINE 5 MG: 5 TABLET, FILM COATED ORAL at 20:36

## 2020-11-02 ASSESSMENT — ACTIVITIES OF DAILY LIVING (ADL)
DRESS: STREET CLOTHES
LAUNDRY: WITH SUPERVISION
HYGIENE/GROOMING: INDEPENDENT
ORAL_HYGIENE: INDEPENDENT

## 2020-11-02 NOTE — PROGRESS NOTES
Pt appeared to have a good evening, socializing with peers and playing cards. Pt denies SI and SIB.    Pt was given trazodone 50mg at HS, pt returned an hour later stating the medication was ineffective. Pt was given an additional dose of trazodone 50mg.

## 2020-11-02 NOTE — PROGRESS NOTES
Pt states she wants to have the benzodiazepines that she came into the hospital with destroyed and not returned to her on discharge. Will destroy per hospital policy.

## 2020-11-02 NOTE — PROGRESS NOTES
[facility], [place]   Psychiatric Progress Note  TTelemedicine Visit: The patient's condition can be safely assessed and treated via synchronous audio and visual telemedicine encounter.                Interim history   This is a 29 year old adult with1.  Benzodiazepine use disorder, moderate.   2.  Schizophrenia. .Pt seen in rounds.   The patient's care was discussed with the treatment team during the daily team meeting and/or staff's chart notes were reviewed.  Staff report patient has been visible in the milieu,  no acute eventsovernight.     Patient's mood is happy  Energy Level:MODERATE  Sleep:No concerns, sleeps well through night  Appetite:normal motivation interest   Suicidal/homicidal ideation/plan intent.psychosis  No prior suicde attempts  No access to gun  Pt is in alcohol withdrawal still being monitered every 4 hrs for it,   Pt mssa score are monitered  Tolerating meds and has no side effects.              Medications:     Current Facility-Administered Medications   Medication     acetaminophen (TYLENOL) tablet 650 mg     alum & mag hydroxide-simethicone (MAALOX  ES) suspension 30 mL     benztropine (COGENTIN) tablet 0.5 mg     bisacodyl (DULCOLAX) Suppository 10 mg     [START ON 11/3/2020] estradiol cypionate (DEPO-ESTRADIOL) 5 MG/ML injection 1.5 mg     hydrOXYzine (ATARAX) tablet 25 mg     lurasidone (LATUDA) tablet 60 mg     magnesium hydroxide (MILK OF MAGNESIA) suspension 30 mL     nicotine (NICODERM CQ) 21 MG/24HR 24 hr patch 1 patch     nicotine Patch in Place     OLANZapine (zyPREXA) tablet 5 mg     ondansetron (ZOFRAN-ODT) ODT tab 4 mg     PHENobarbital (LUMINAL) tablet 32.4 mg     phenylephrine-cocoa butter (PREPARATION H) per suppository 1 suppository     traZODone (DESYREL) tablet 50 mg             Allergies:   No Known Allergies         Psychiatric Examination:   Blood pressure 98/62, pulse 74, temperature 97.8  F (36.6  C), temperature source Temporal, resp.  "rate 16, height 1.778 m (5' 10\"), weight 60.3 kg (133 lb), SpO2 98 %.  Weight is 133 lbs 0 oz  Body mass index is 19.08 kg/m .    Appearance:  awake, alert and adequately groomed  Attitude:  cooperative  Eye Contact:  good  Mood:  better  Affect:  appropriate and in normal range and mood congruent  Speech:  clear, coherent rate /rhythm are good  Psychomotor Behavior:  no evidence of tardive dyskinesia, dystonia, or tics and intact station, gait and muscle tone  Throught Process:  logical  Associations:  no loose associations  Thought Content:  no evidence of suicidal ideation or homicidal ideation, no evidence of psychotic thought, no auditory hallucinations present and no visual hallucinations present  Insight:  fair  Judgement:  fair  Oriented to:  time, person, and place  Attention Span and Concentration:  intact  Recent and Remote Memory:  intact  Language fund of knowledge are adequate         Labs:     No results found for: NTBNPI, NTBNP  Lab Results   Component Value Date    WBC 6.2 10/27/2020    HGB 15.7 10/27/2020    HCT 43.3 10/27/2020    MCV 90 10/27/2020     10/27/2020         DX   1.  Benzodiazepine use disorder, moderate.   2.  Schizophrenia.    PLAN  Patient will stop phenobarbital taper today  30 mg twice a day  MSSA    Eating Disturbances: ate and enjoyed all of it or not applicable  Tremor: 0 - no tremor  Sleep Disturbance: slept through the night or not applicable  Clouding of Sensorium: no evidence  Hallucinations: 0 - none  Quality of Contact: 0 - awareness of examiner and people around him/her  Agitation: 0 - normal activity  Paroxysmal Sweats: 0 - no observed sweating  Temperature: 99.5 or below  Pulse: 1 - 70 to 79  Total MSSA Score: 2    Patient complains of insomnia mainly increase trazodone to 150 mg at night as needed  Laboratory/Imaging: reviewed with patient   Consults: internal medicine consult reviewed  Patient will be treated in therapeutic milieu with appropriate individual " and group therapies as described.  PDMP CHECKED     Supportive psychotheraoy provided, rahel talked about recovery enviroment, relapse prevention, triggers to use.  Discussed with patient many issues of addiction,triggers, relapse, and establishing a solid recovery program.  Asked pt to be med complinat   Medical diagnoses to be addressed this admission:    Plan:  #Benzodiazepine dependence and withdrawal  Patient reports taking 2-8 mg Lorazepam and 5 mg clonazepam daily.   -Agree with phenobarbital taper, management per psychiatry    #Schizoaffective disorder   PTA luradidone 60 mg daily, zyprexa 5 mg daily, and cogentin 0.5 mg BID.   -Management per psychiatry      #Gender dysphoria   PTA Estradiol cypionate 5 mg/mL 1.5 mg IM injection every 6 days, last taken on Wednesday 10/28/2020.   -Continue PTA Estradiol IM injections every 6 days, next due on 11/3. Patient brought in own medication to use.      #Tobacco dependence   Smokes 1 ppd of cigarettes daily. Agree with nicotine patch 21 mg/hr.         Legal Status: voluntary    Safety Assessment:   Checks:  15 min  Precautions: withdrawal precautions  Pt has not required locked seclusion or restraints in the past 24 hours to maintain safety, please refer to RN documentation for further details.  Discussed with patient many issues of addiction,triggers, relapse, and establishing a solid recovery program.  Able to give informed consent:  YES   Discussed Risks/Benefits/Side Effects/Alternatives: YES    After discussion of the indications, risks, benefits, alternatives and consequences of no treatment, the patient elects to complete detox and do treatment

## 2020-11-03 VITALS
BODY MASS INDEX: 19.04 KG/M2 | HEIGHT: 70 IN | DIASTOLIC BLOOD PRESSURE: 69 MMHG | RESPIRATION RATE: 16 BRPM | WEIGHT: 133 LBS | HEART RATE: 91 BPM | TEMPERATURE: 98.3 F | SYSTOLIC BLOOD PRESSURE: 106 MMHG | OXYGEN SATURATION: 98 %

## 2020-11-03 PROCEDURE — 250N000013 HC RX MED GY IP 250 OP 250 PS 637: Performed by: NURSE PRACTITIONER

## 2020-11-03 PROCEDURE — 99239 HOSP IP/OBS DSCHRG MGMT >30: CPT | Performed by: PSYCHIATRY & NEUROLOGY

## 2020-11-03 PROCEDURE — 250N000013 HC RX MED GY IP 250 OP 250 PS 637: Performed by: PSYCHIATRY & NEUROLOGY

## 2020-11-03 RX ORDER — TRAZODONE HYDROCHLORIDE 100 MG/1
100-150 TABLET ORAL
Qty: 30 TABLET | Refills: 1 | Status: SHIPPED | OUTPATIENT
Start: 2020-11-03 | End: 2021-09-28

## 2020-11-03 RX ORDER — PHENOBARBITAL 32.4 MG/1
32.4 TABLET ORAL AT BEDTIME
Qty: 2 TABLET | Refills: 0 | Status: SHIPPED | OUTPATIENT
Start: 2020-11-03 | End: 2021-09-28

## 2020-11-03 RX ADMIN — PHENOBARBITAL 32.4 MG: 32.4 TABLET ORAL at 08:23

## 2020-11-03 RX ADMIN — NICOTINE 1 PATCH: 21 PATCH, EXTENDED RELEASE TRANSDERMAL at 08:23

## 2020-11-03 ASSESSMENT — ACTIVITIES OF DAILY LIVING (ADL)
HYGIENE/GROOMING: INDEPENDENT
ORAL_HYGIENE: INDEPENDENT
DRESS: STREET CLOTHES;INDEPENDENT
LAUNDRY: WITH SUPERVISION

## 2020-11-03 NOTE — PROGRESS NOTES
Patient discharged at 1022 to home. Patient reports she will be picked up by a sober and supportive friend. All patient belongings from room, locker, and security sent with patient. Patient escorted off the unit by Psych Associate, Teresa.     This RN went over discharge instructions, teachings, patient's labs, and unit recommendations with patient. This RN went over patient's prescribed medications being sent home with patient from the discharge pharmacy. Patient verbalizes and demonstrates understanding of all teachings, including phenobarbital taper. Patient verbalizes understanding of medication instructions and indications. Patient denies thoughts of harm towards self or others. Patient denies access to guns or drugs at home. Patient denies auditory/visual hallucinations. Pt denies any current medication side effects or medical issues at this time. Patient reports that she feels happy and hopeful for the future.     Patient denies any further questions and is now discharged at this time.

## 2020-11-03 NOTE — DISCHARGE SUMMARY
"Admit Date:     10/29/2020   Discharge Date:     11/03/2020      More than 35 minutes spent on discharge summary, doing the discharge instructions, discharge medication, discharge mental status examination.      DISCHARGE DIAGNOSES:   Axis I:  Benzodiazepine use disorder, schizoaffective disorder, rule out schizophrenia, gender dysphoria.      Please review detailed admission note by Dr. Love on 10/30/2020.      HOSPITAL COURSE:    Nay Lainez is a 29 year old adult (male to female transgendered individual) w/ PMHx of gender dysphoria status post bilateral orchiectomy (2018), During the hospitalization, the patient was detoxed off benzos using phenobarbital.  She started the taper.  She is tolerating the taper.  For her schizoaffective disorder, she was continued on Latuda and the patient signed a neuroleptic consent.  Her energy, motivation, sleep and interest improved.  She does not have any suicidal ideation, plan or intent.  She was seen by Maryana Bennett PA-C for Internal Medicine consult. \"Assessment & Plan     Nay Lainez is a 29 year old adult (male to female transgendered individual) w/ PMHx of gender dysphoria status post bilateral orchiectomy (2018), schizoaffective disorder, anixety, alcohol use disorder admitted on 10/29/2020 for benzodiazepine detox.     #Benzodiazepine dependence and withdrawal  Patient reports taking 2-8 mg Lorazepam and 5 mg clonazepam daily.   -Agree with phenobarbital taper, management per psychiatry    #Schizoaffective disorder   PTA luradidone 60 mg daily, zyprexa 5 mg daily, and cogentin 0.5 mg BID.   -Management per psychiatry      #Gender dysphoria   PTA Estradiol cypionate 5 mg/mL 1.5 mg IM injection every 6 days, last taken on Wednesday 10/28/2020.   -Continue PTA Estradiol IM injections every 6 days, next due on 11/3. Patient brought in own medication to use.      #Tobacco dependence   Smokes 1 ppd of cigarettes daily. Agree with nicotine patch 21 mg/hr.  "         Medicine will sign off. No further recommendations at this time. Please page the on-call THOMPSON for any intercurrent medical issues which arise.      Maryana Bennett PA-C   Hospitalist Service  [phone]  [facility]      The patient was continued on her estradiol 5.8 for her gender dysphoria.  For schizoaffective disorder She was continued on Latuda, Zyprexa Cogentin.  Her energy, motivation, sleep and interest improved.  She did not have any suicidal or homicidal ideation, plan or intent.      DISCHARGE DISPOSITION:  The patient going home.      DISCHARGE MENTAL STATUS EXAMINATION:  The patient is alert, oriented x 3.  Recent memory, language, fund of knowledge .  No loose associations.  The patient does not have any active suicidal or homicidal ideation, plan or intent.        The patient is going to go home and followup with her 12-step meetings.  The patient was asked to communicate her abuse of benzos to the primary prescribing doctor.  She has not signed a consent for us.             Recent Results (from the past 240 hour(s))   CBC with platelets differential    Collection Time: 10/27/20  2:56 PM   Result Value Ref Range    WBC 6.2 4.0 - 11.0 10e9/L    RBC Count 4.79 4.4 - 5.9 10e12/L    Hemoglobin 15.7 13.3 - 17.7 g/dL    Hematocrit 43.3 40.0 - 53.0 %    MCV 90 78 - 100 fl    MCH 32.8 26.5 - 33.0 pg    MCHC 36.3 31.5 - 36.5 g/dL    RDW 11.8 10.0 - 15.0 %    Platelet Count 201 150 - 450 10e9/L    Diff Method Automated Method     % Neutrophils 66.9 %    % Lymphocytes 23.0 %    % Monocytes 5.0 %    % Eosinophils 3.9 %    % Basophils 1.0 %    % Immature Granulocytes 0.2 %    Nucleated RBCs 0 0 /100    Absolute Neutrophil 4.2 1.6 - 8.3 10e9/L    Absolute Lymphocytes 1.4 0.8 - 5.3 10e9/L    Absolute Monocytes 0.3 0.0 - 1.3 10e9/L    Absolute Eosinophils 0.2 0.0 - 0.7 10e9/L    Absolute Basophils 0.1 0.0 - 0.2 10e9/L    Abs Immature Granulocytes 0.0 0 - 0.4  10e9/L    Absolute Nucleated RBC 0.0    Comprehensive metabolic panel    Collection Time: 10/27/20  2:56 PM   Result Value Ref Range    Sodium 139 133 - 144 mmol/L    Potassium 3.7 3.4 - 5.3 mmol/L    Chloride 107 94 - 109 mmol/L    Carbon Dioxide 26 20 - 32 mmol/L    Anion Gap 6 3 - 14 mmol/L    Glucose 94 70 - 99 mg/dL    Urea Nitrogen 6 (L) 7 - 30 mg/dL    Creatinine 0.84 0.66 - 1.25 mg/dL    GFR Estimate >90 >60 mL/min/[1.73_m2]    GFR Estimate If Black >90 >60 mL/min/[1.73_m2]    Calcium 8.5 8.5 - 10.1 mg/dL    Bilirubin Total 0.4 0.2 - 1.3 mg/dL    Albumin 4.3 3.4 - 5.0 g/dL    Protein Total 7.8 6.8 - 8.8 g/dL    Alkaline Phosphatase 68 40 - 150 U/L    ALT 22 0 - 70 U/L    AST 18 0 - 45 U/L   Alcohol ethyl    Collection Time: 10/27/20  2:56 PM   Result Value Ref Range    Ethanol g/dL <0.01 <0.01 g/dL   Drug abuse screen 6 urine (chem dep)    Collection Time: 10/27/20  2:56 PM   Result Value Ref Range    Amphetamine Qual Urine Negative NEG^Negative    Barbiturates Qual Urine Negative NEG^Negative    Benzodiazepine Qual Urine Negative NEG^Negative    Cannabinoids Qual Urine Negative NEG^Negative    Cocaine Qual Urine Negative NEG^Negative    Ethanol Qual Urine Negative NEG^Negative    Opiates Qualitative Urine Negative NEG^Negative   Asymptomatic COVID-19 Virus (Coronavirus) by PCR    Collection Time: 10/27/20  4:13 PM    Specimen: Nasopharyngeal   Result Value Ref Range    COVID-19 Virus PCR to [facility] - Source Nasopharyngeal     COVID-19 Virus PCR to [facility] - Result       Test received-See reflex to IDDL test SARS CoV2 (COVID-19) Virus RT-PCR   SARS-CoV-2 COVID-19 Virus (Coronavirus) RT-PCR Nasopharyngeal    Collection Time: 10/27/20  4:13 PM    Specimen: Nasopharyngeal   Result Value Ref Range    SARS-CoV-2 Virus Specimen Source Nasopharyngeal     SARS-CoV-2 PCR Result NEGATIVE     SARS-CoV-2 PCR Comment       Testing was performed using the Simplexa COVID-19 Direct Assay on the [employer] MDX    instrument. Additional information about this Emergency Use Authorization (EUA) assay can   be found via the Lab Guide.     Drug abuse screen 6 urine (chem dep)    Collection Time: 10/27/20  4:37 PM   Result Value Ref Range    Amphetamine Qual Urine Negative NEG^Negative    Barbiturates Qual Urine Negative NEG^Negative    Benzodiazepine Qual Urine Negative NEG^Negative    Cannabinoids Qual Urine Negative NEG^Negative    Cocaine Qual Urine Negative NEG^Negative    Ethanol Qual Urine Negative NEG^Negative    Opiates Qualitative Urine Negative NEG^Negative   Drug abuse screen 6 urine (tox)    Collection Time: 10/29/20 12:56 PM   Result Value Ref Range    Amphetamine Qual Urine Negative NEG^Negative    Barbiturates Qual Urine Negative NEG^Negative    Benzodiazepine Qual Urine Negative NEG^Negative    Cannabinoids Qual Urine Negative NEG^Negative    Cocaine Qual Urine Negative NEG^Negative    Ethanol Qual Urine Negative NEG^Negative    Opiates Qualitative Urine Negative NEG^Negative   HCG qualitative urine    Collection Time: 10/29/20 12:56 PM   Result Value Ref Range    HCG Qual Urine Negative NEG^Negative   Clonazepam and metabolite    Collection Time: 10/29/20  2:45 PM   Result Value Ref Range    Clonazepam Level 25 20 - 70 ng/mL    7 Aminoclonazepam <10 NOT ESTABLISHED ng/mL   Asymptomatic COVID-19 Virus (Coronavirus) by PCR    Collection Time: 10/29/20  3:58 PM    Specimen: Nasopharyngeal   Result Value Ref Range    COVID-19 Virus PCR to [facility] - Source Nasopharyngeal     COVID-19 Virus PCR to [facility] - Result       Test received-See reflex to IDDL test SARS CoV2 (COVID-19) Virus RT-PCR   SARS-CoV-2 COVID-19 Virus (Coronavirus) RT-PCR Nasopharyngeal    Collection Time: 10/29/20  3:58 PM    Specimen: Nasopharyngeal   Result Value Ref Range    SARS-CoV-2 Virus Specimen Source Nasopharyngeal     SARS-CoV-2 PCR Result NEGATIVE     SARS-CoV-2 PCR Comment       Testing was performed using the Aptima SARS-CoV-2 Assay on  the [employer] Instrument System.   Additional information about this Emergency Use Authorization (EUA) assay can be found via   the Lab Guide.     Lipid panel    Collection Time: 10/30/20  6:41 AM   Result Value Ref Range    Cholesterol 145 <200 mg/dL    Triglycerides 91 <150 mg/dL    HDL Cholesterol 46 >39 mg/dL    LDL Cholesterol Calculated 81 <100 mg/dL    Non HDL Cholesterol 99 <130 mg/dL   TSH with free T4 reflex and/or T3 as indicated    Collection Time: 10/30/20  6:41 AM   Result Value Ref Range    TSH 0.84 0.40 - 4.00 mU/L        Disposition: Home     Facts about COVID19 at www.cdc.gov/COVID19 and www.[place].gov/covid19     Keeping hands clean is one of the most important steps we can take to avoid getting sick and spreading germs to others.  Please wash your hands frequently and lather with soap for at least 20 seconds!     Medical Follow-Up:  [facility]  Address: [address] [place] [address]  Phone: [phone]  Appointment Date/Time: Friday, November 27th at 10:00am     Rule 25 Follow-Up:  [facility]  Weekday Assessments - Monday thru Friday, 7:00am to 1:45pm    [address] [place] [address]  Weekend Assessments - Saturday s, 7:45am to 10:45am    [address], [place] [address]   Phone: [phone]         DISCHARGE MENTAL STATUS EXAMINATION:  The patient is alert, oriented x3.  Good fund of knowledge.  Good use of language.  Recent and remote memory, language, fund of knowledge are all adequate.  Euthymic mood congruent affect  Speech normal rate/rhythm linear tp no loose asso,The patient does not have any active suicidal or homicidal ideation.  Does not have any auditory or visual hallucination.  Fair insight/judgment At this time, the patient was stable to be discharged.        Pt was not determined to not be a danger to himself or others. At the current time of discharge, the patient does not meet criteria for involuntary hospitalization.  On the day of discharge, the patient reports that they do not have suicidal or homicidal ideation and would never hurt themselves or others. Steps taken to minimize risk include: assessing patient s behavior and thought process daily during hospital stay, discharging patient with adequate plan for follow up for mental and physical health and discussing safety plan of returning to the hospital should the patient ever have thoughts of harming themselves or others. Therefore, based on all available evidence including the factors cited above, the patient does not appear to be at imminent risk for self-harm, and is appropriate for outpatient level of care.     Educated about side effects/risk vs benefits /alternative including non treatment.Pt consented to be on medication.     .Total time spent on discharge summary more than 35 min  More than  20 min  planning, coordination of care, medication reconciliation and performance of physical exam on day of discharge.Care was coordinated with unit RN and unit therapist    .   MigelJosueRosy   Home Medication Instructions ROC:[id number]    Printed on:20 1048   Medication Information                      benztropine (COGENTIN) 0.5 MG tablet  Take 0.5 mg by mouth 2 times daily             estradiol cypionate (DEPO-ESTRADIOL) 5 MG/ML injection  Inject 1.5 mg into the muscle every 6 days             lurasidone (LATUDA) 60 MG TABS tablet  Take 60 mg by mouth daily (with dinner)              OLANZapine (ZYPREXA) 5 MG tablet  Take 5 mg by mouth At Bedtime             PHENobarbital (LUMINAL) 32.4 MG tablet  Take 1 tablet (32.4 mg) by mouth At Bedtime             traZODone (DESYREL) 100 MG tablet  Take 1-1.5 tablets (100-150 mg) by mouth nightly as needed for sleep               PRINCESS DAVILA MD             D: 2020   T: 2020   MT: TOVA      Name:     ROSY DIEHL   MRN:      [id number]        Account:        [id number]   :      1990           Admit  Date:     10/29/2020                                  Discharge Date: 11/03/2020      Document: [id number]       cc: Ventura Morelos DO

## 2020-11-03 NOTE — DISCHARGE INSTRUCTIONS
Behavioral Discharge Planning and Instructions  THANK YOU FOR CHOOSING [address]  [phone]    Summary: You were admitted to [facility] on 10/29/20 for detoxification from benzodiazepines.  A medical exam was performed that included lab work. You have met with a  and opted to return home and continue with 12 step support group meetings.  Please take care and make your recovery a daily priority, Nay! It was a pleasure working with you and the entire treatment team here wishes you the very best in your recovery!     Recommendation:  12 step support group meetings. If you are having a difficult time staying sober please seek out Rule 25 assessment for treatment referral.    Main Diagnoses:  Per Dr. Rashmi Love MD:  1. Benzodiazepine Use Disorder-Severe    Major Treatments, Procedures and Findings:  You were treated for benzodiazepine detoxification using phenobarbital.  You declined a chemical dependency assessment. You had labs drawn and those results were reviewed with you. Please take a copy of your lab work with you to your next primary care physician appointment.    Symptoms to Report:  If you experience more anxiety, confusion, sleeplessness, deep sadness or thoughts of suicide, notify your treatment team or notify your primary care physician. IF ANY OF THE SYMPTOMS YOU ARE EXPERIENCING ARE A MEDICAL EMERGENCY CALL 911 IMMEDIATELY.     Lifestyle Adjustment: Adjust your lifestyle to get enough sleep, relaxation, exercise and good nutrition. Continue to develop healthy coping skills to decrease stress and promote a sober living environment. Do not use mood altering substances including alcohol, illegal drugs or addictive medications other than what is currently prescribed.     Disposition: Home    Facts about COVID19 at www.cdc.gov/COVID19 and www.[place].gov/covid19    Keeping hands clean is one of the most important steps we can take to avoid getting sick and spreading germs to  others.  Please wash your hands frequently and lather with soap for at least 20 seconds!    Medical Follow-Up:  [facility]  Address: [address] [place] [address]  Phone: [phone]  Appointment Date/Time: Friday, November 27th at 10:00am    Rule 25 Follow-Up:  [facility]  Weekday Assessments - Monday thru Friday, 7:00am to 1:45pm    [address] [place] [address]  Weekend Assessments - Saturday s, 7:45am to 10:45am    [address], [place] [address]   Phone: [phone]    Recovery apps for your phone to locate current in person and zoom recovery meetings  [place] - meeting shama  AA  - meeting shama  Meeting guide - meeting shama  Quick NA meeting - meeting shama  Angel- has various apps    Resources:  *due to covid-19 most AA/NA meetings are being held online*  AA meetings can be found online; search for them at: http://aa-intergroup.org/directory.php  AA meetings via [place] for [place] area can be found online at: https://a[place].org/find-a-meeting/holiday-closings/  NA meetings via [place]OM for [place] area can be found online at: https://sites.google.com/view/[place]regionofnarcoticsanonymous/home?authuser=2  AA/NA and Sponsors are excellent resources for support and you can find one at any support group meeting.   Alcoholics Anonymous (www.alcoholics-anonymous.org): for local information 24 hours/day  AA Intergroup service office in [place] (http://www.aastpaul.org/) [phone]  AA Intergroup service office in [facility]: [phone]. (http://www.a[place].org/)  Narcotics Anonymous (www.naminn[place].org) (573) 565-6126  https://aafairviewriverside.org/meetings  SMART Recovery - self management for addiction recovery:  www.smartrecovery.org  Pathways ~ A Health Crisis Resource & Support Center:  160.217.8879.  https://prescribetoprevent.org/patient-education/videos/  http://www.harmreduction.org  [facility]  139.930.2376  Support Group:  AA/NA and Sponsor/support.  National [place] on Mental Illness (www.[place].monse.org): 194.457.5869 or 617-019-1471.  Alcoholics Anonymous (www.alcoholics-anonymous.org): Check your phone book for your local chapter.  Suicide Awareness Voices of Education (SAVE) (www.save.org): 811-650-BQKC (7617)  National Suicide Prevention Line (www.mentalSiperianmn.org): 727-137-ZLNI (5244)  Mental Health Consumer/Survivor Network of [place] (www.mhcsn.net): 575.727.5001 or 458-394-7786  Mental Health Association of [place] (www.mentalhealth.org): 811.115.2973 or 702-605-0738   Substance Abuse and Mental Health Services (www.samhsa.gov)  [place] Opioid Prevention Coalition: www.opioidcoalition.org    [place] Recovery Connection ([facility])  [facility] connects people seeking recovery to resources that help foster and sustain long-term recovery.  Whether you are seeking resources for treatment, transportation, housing, job training, education, health care or other pathways to recovery, [facility] is a great place to start.  916.267.8853.  www.[facility].org    Great Pod casts for nutrition and wellness  Listen on Apple Podcasts  Dishing Up Nutrition   [employer]   Nutrition       Understand the connection between what you eat and how you feel. Hosted by licensed nutritionists and dietitians from [employer] we share practical, real-life solutions for healthier living through nutrition.     General Medication Instructions:   See your medication sheet(s) for instructions.   Take all medications as prescribed.  Make no changes unless your doctor suggests them.   Go to all your doctor visits.  Be sure to have all your required lab tests. This way, your medicines can be refilled on time.  Do not use any forms of alcohol.    Please Note:  If you have any questions at anytime after you are discharged please call [facility] detox unit [facility] at [phone].  [facility]  Behavioral Intake [phone]  Medical Records call [phone]  Outpatient Behavioral Intake call [phone]  LP+ Wait List/Bed Availability call [phone]    Please remember to take all of your behavioral discharge planning and lab paperwork to any follow up appointments, it contains your lab results, diagnosis, medication list and discharge recommendations.      THANK YOU FOR CHOOSING [facility]

## 2020-11-03 NOTE — PROGRESS NOTES
Medications destroyed upon patient's request.  Lorazepam 72 tablets and clonazepam 3 tablets.      Verified by Javy Alvarado RN

## 2020-11-11 ENCOUNTER — DOCUMENTATION ONLY (OUTPATIENT)
Dept: PLASTIC SURGERY | Facility: CLINIC | Age: 30
End: 2020-11-11

## 2020-12-27 ENCOUNTER — HEALTH MAINTENANCE LETTER (OUTPATIENT)
Age: 30
End: 2020-12-27

## 2021-02-11 ENCOUNTER — PREP FOR PROCEDURE (OUTPATIENT)
Dept: PLASTIC SURGERY | Facility: CLINIC | Age: 31
End: 2021-02-11

## 2021-02-11 ENCOUNTER — HOSPITAL ENCOUNTER (INPATIENT)
Facility: CLINIC | Age: 31
Setting detail: SURGERY ADMIT
End: 2021-02-11
Attending: PLASTIC SURGERY | Admitting: PLASTIC SURGERY
Payer: COMMERCIAL

## 2021-02-11 DIAGNOSIS — F64.0 GENDER DYSPHORIA IN ADULT: Primary | ICD-10-CM

## 2021-03-03 ENCOUNTER — TELEPHONE (OUTPATIENT)
Dept: PLASTIC SURGERY | Facility: CLINIC | Age: 31
End: 2021-03-03

## 2021-03-03 NOTE — TELEPHONE ENCOUNTER
Left voicemail to schedule surgery with Dr. Díaz and Dr. Kendall on April 29th.    Also sent message via [employer].

## 2021-03-08 ENCOUNTER — TELEPHONE (OUTPATIENT)
Dept: PLASTIC SURGERY | Facility: CLINIC | Age: 31
End: 2021-03-08

## 2021-03-08 NOTE — TELEPHONE ENCOUNTER
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    Writer LVM for patient asking what month she is thinking. Will put a hold on the calendar and officially schedule in a few months.    Will wait for voicemail regarding which month patient prefers.

## 2021-03-19 ENCOUNTER — DOCUMENTATION ONLY (OUTPATIENT)
Dept: PLASTIC SURGERY | Facility: CLINIC | Age: 31
End: 2021-03-19

## 2021-03-19 NOTE — PROGRESS NOTES
Sent patient [employer] message regarding Dr. Díaz's departure from the [facility], effective 5/20.    Informed the patient that we do not have a sooner date available for surgery at this time.     We are waiting on information from the division regarding referrals and/or a possible replacement for Dr. Díaz.    Notified the patient that we will reach out when we have this information.

## 2021-03-19 NOTE — PROGRESS NOTES
Patient had left voicemail to schedule her surgery this fall with Dr. Díaz and Dr. Kendall.    She has not seen Valente yet so I called her and left a voicemail explaining my message and Dr. Díaz's departure.

## 2021-03-23 ENCOUNTER — TELEPHONE (OUTPATIENT)
Dept: PLASTIC SURGERY | Facility: CLINIC | Age: 31
End: 2021-03-23

## 2021-03-23 DIAGNOSIS — F64.0 GENDER DYSPHORIA IN ADOLESCENT AND ADULT: Primary | ICD-10-CM

## 2021-03-23 NOTE — TELEPHONE ENCOUNTER
[facility] Call Center    Phone Message    May a detailed message be left on voicemail: yes     Reason for Call: Appointment Intake    Referring Provider Name:   JIMMY  Diagnosis and/or Symptoms: Facial Feminization Surgery    Please call Nay back to get this scheduled.         Action Taken: Message routed to:  [facility]    Travel Screening: Not Applicable                                                                    ;

## 2021-03-24 LAB
CHOLEST SERPL-MCNC: 145 MG/DL
HDLC SERPL-MCNC: 46 MG/DL
LDLC SERPL CALC-MCNC: 81 MG/DL
NONHDLC SERPL-MCNC: 99 MG/DL
TRIGL SERPL-MCNC: 91 MG/DL

## 2021-03-24 NOTE — TELEPHONE ENCOUNTER
[facility] :  Care Coordination Note     SITUATION   Pt (Nay, she/her) is a 30 year old adult who is receiving support for:  Appointment (Facial Feminization Surgery)  .    BACKGROUND     Called pt regarding interest in [facility]. Scheduled pt for consult with Dr. Bah 6/25/21.     Also discussed Dr. Díaz's departure from [facility]. Pt explained that she is not interested in seeking care for bottom surgery at [facility] anymore. Writer will not be adding pt to future contact list.     ASSESSMENT     Surgery              [facility] Assessment  [facility] [place] [place]ment: Enrolled  Patient has a therapist: Yes  Name of therapist: Leonarda Banegas at [facility]  Letter of support #1: Requested  Surgery being considered: Yes    Pt reports:    Daily vaping  No diabetes  HRT for 8 years        PLAN          Nursing Interventions:  [facility] assessment completed    Follow-up plan:    1. Obtain JET Cao

## 2021-03-26 NOTE — TELEPHONE ENCOUNTER
FUTURE VISIT INFORMATION      FUTURE VISIT INFORMATION:    Date: 6/25/21    Time: 11 AM    Location: [facility]  REFERRAL INFORMATION:    Referring provider:      Referring providers clinic:      Reason for visit/diagnosis: [facility]CS    RECORDS REQUESTED FROM:       Clinic name Comments Records Status Imaging Status   [facility] 10/29/20 - Admission with Dr. Love [facility] 12/3/19 - PLASTICS OV with Dr. Bre Alvarez    [facility] 8/29/19 - SEXUAL Health OV with Dr. Crenshaw [facility] Everywhere

## 2021-04-24 ENCOUNTER — HEALTH MAINTENANCE LETTER (OUTPATIENT)
Age: 31
End: 2021-04-24

## 2021-06-02 ENCOUNTER — RECORDS - HEALTHEAST (OUTPATIENT)
Dept: ADMINISTRATIVE | Facility: CLINIC | Age: 31
End: 2021-06-02

## 2021-06-25 ENCOUNTER — OFFICE VISIT (OUTPATIENT)
Dept: PLASTIC SURGERY | Facility: CLINIC | Age: 31
End: 2021-06-25
Attending: PLASTIC SURGERY
Payer: COMMERCIAL

## 2021-06-25 ENCOUNTER — PRE VISIT (OUTPATIENT)
Dept: PLASTIC SURGERY | Facility: CLINIC | Age: 31
End: 2021-06-25

## 2021-06-25 VITALS
SYSTOLIC BLOOD PRESSURE: 112 MMHG | WEIGHT: 165.3 LBS | HEIGHT: 70 IN | HEART RATE: 71 BPM | OXYGEN SATURATION: 97 % | BODY MASS INDEX: 23.66 KG/M2 | DIASTOLIC BLOOD PRESSURE: 78 MMHG

## 2021-06-25 DIAGNOSIS — F64.0 GENDER DYSPHORIA IN ADOLESCENT AND ADULT: Primary | ICD-10-CM

## 2021-06-25 PROCEDURE — 99215 OFFICE O/P EST HI 40 MIN: CPT | Mod: KX | Performed by: PLASTIC SURGERY

## 2021-06-25 RX ORDER — ESTRADIOL VALERATE 20 MG/ML
4 INJECTION INTRAMUSCULAR
COMMUNITY
Start: 2021-06-03

## 2021-06-25 RX ORDER — EMTRICITABINE AND TENOFOVIR DISOPROXIL FUMARATE 200; 300 MG/1; MG/1
1 TABLET, FILM COATED ORAL DAILY
COMMUNITY
Start: 2021-06-12

## 2021-06-25 RX ORDER — FINASTERIDE 5 MG/1
2.5 TABLET, FILM COATED ORAL EVERY OTHER DAY
COMMUNITY
Start: 2021-05-13

## 2021-06-25 ASSESSMENT — MIFFLIN-ST. JEOR: SCORE: 1540.05

## 2021-06-25 ASSESSMENT — PAIN SCALES - GENERAL: PAINLEVEL: NO PAIN (0)
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
## 2021-06-25 NOTE — NURSING NOTE
"Chief Complaint   Patient presents with     RECHECK     FGCS.       Vitals:    06/25/21 1052   BP: 112/78   BP Location: Left arm   Patient Position: Sitting   Cuff Size: Adult Regular   Pulse: 71   SpO2: 97%   Weight: 75 kg (165 lb 4.8 oz)   Height: 1.778 m (5' 10\")       Body mass index is 23.72 kg/m .                          Patricia Catherine EMT    "

## 2021-08-04 ENCOUNTER — MEDICAL CORRESPONDENCE (OUTPATIENT)
Dept: HEALTH INFORMATION MANAGEMENT | Facility: CLINIC | Age: 31
End: 2021-08-04

## 2021-09-09 ENCOUNTER — HOSPITAL ENCOUNTER (EMERGENCY)
Facility: CLINIC | Age: 31
Discharge: HOME | End: 2021-09-09
Attending: PSYCHIATRY & NEUROLOGY | Admitting: PSYCHIATRY & NEUROLOGY
Payer: COMMERCIAL

## 2021-09-09 VITALS
TEMPERATURE: 96.7 F | RESPIRATION RATE: 16 BRPM | OXYGEN SATURATION: 98 % | HEART RATE: 61 BPM | SYSTOLIC BLOOD PRESSURE: 121 MMHG | DIASTOLIC BLOOD PRESSURE: 65 MMHG

## 2021-09-09 DIAGNOSIS — F41.1 GAD (GENERALIZED ANXIETY DISORDER): ICD-10-CM

## 2021-09-09 DIAGNOSIS — F64.9 GENDER DYSPHORIA: ICD-10-CM

## 2021-09-09 DIAGNOSIS — F25.0 SCHIZOAFFECTIVE DISORDER, BIPOLAR TYPE (H): ICD-10-CM

## 2021-09-09 LAB
AMPHETAMINES UR QL SCN: ABNORMAL
BARBITURATES UR QL: ABNORMAL
BENZODIAZ UR QL: ABNORMAL
CANNABINOIDS UR QL SCN: ABNORMAL
COCAINE UR QL: ABNORMAL
HCG UR QL: NEGATIVE
OPIATES UR QL SCN: ABNORMAL

## 2021-09-09 PROCEDURE — 80307 DRUG TEST PRSMV CHEM ANLYZR: CPT | Performed by: PSYCHIATRY & NEUROLOGY

## 2021-09-09 PROCEDURE — 90791 PSYCH DIAGNOSTIC EVALUATION: CPT

## 2021-09-09 PROCEDURE — 250N000013 HC RX MED GY IP 250 OP 250 PS 637: Performed by: PSYCHIATRY & NEUROLOGY

## 2021-09-09 PROCEDURE — 99285 EMERGENCY DEPT VISIT HI MDM: CPT | Mod: 25 | Performed by: PSYCHIATRY & NEUROLOGY

## 2021-09-09 PROCEDURE — 99284 EMERGENCY DEPT VISIT MOD MDM: CPT | Performed by: PSYCHIATRY & NEUROLOGY

## 2021-09-09 PROCEDURE — 80307 DRUG TEST PRSMV CHEM ANLYZR: CPT | Performed by: EMERGENCY MEDICINE

## 2021-09-09 PROCEDURE — 81025 URINE PREGNANCY TEST: CPT | Performed by: EMERGENCY MEDICINE

## 2021-09-09 PROCEDURE — 81025 URINE PREGNANCY TEST: CPT | Performed by: PSYCHIATRY & NEUROLOGY

## 2021-09-09 RX ORDER — PROPRANOLOL HYDROCHLORIDE 10 MG/1
10 TABLET ORAL ONCE
Status: COMPLETED | OUTPATIENT
Start: 2021-09-09 | End: 2021-09-09

## 2021-09-09 RX ORDER — GABAPENTIN 300 MG/1
300 CAPSULE ORAL ONCE
Status: COMPLETED | OUTPATIENT
Start: 2021-09-09 | End: 2021-09-09

## 2021-09-09 RX ORDER — OLANZAPINE 5 MG/1
5 TABLET, ORALLY DISINTEGRATING ORAL ONCE
Status: COMPLETED | OUTPATIENT
Start: 2021-09-09 | End: 2021-09-09

## 2021-09-09 RX ORDER — PROPRANOLOL HYDROCHLORIDE 10 MG/1
10 TABLET ORAL 2 TIMES DAILY
Qty: 30 TABLET | Refills: 0 | Status: SHIPPED | OUTPATIENT
Start: 2021-09-09 | End: 2021-09-28

## 2021-09-09 RX ADMIN — OLANZAPINE 5 MG: 5 TABLET, ORALLY DISINTEGRATING ORAL at 12:46

## 2021-09-09 RX ADMIN — GABAPENTIN 300 MG: 300 CAPSULE ORAL at 14:36

## 2021-09-09 RX ADMIN — PROPRANOLOL HYDROCHLORIDE 10 MG: 10 TABLET ORAL at 12:46

## 2021-09-09 ASSESSMENT — ENCOUNTER SYMPTOMS
ACTIVITY CHANGE: 1
AGITATION: 0
NEUROLOGICAL NEGATIVE: 1
NERVOUS/ANXIOUS: 1
HYPERACTIVE: 0
MUSCULOSKELETAL NEGATIVE: 1
RESPIRATORY NEGATIVE: 1
EYES NEGATIVE: 1
GASTROINTESTINAL NEGATIVE: 1
DYSPHORIC MOOD: 1
HALLUCINATIONS: 0
CARDIOVASCULAR NEGATIVE: 1
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
## 2021-09-09 NOTE — ED NOTES
If I am feeling unsafe or I am in a crisis, I will:  Contact my established care providers   Call the National Suicide Prevention Lifeline: [phone]   Go to the nearest emergency room   Call 328          Warning signs that I or other people might notice when a crisis is developing for me:  Somatic symptoms;  Racing heart, spinning thoughts, anxiety about increasing anxiety     Things I am able to do on my own to cope or help me feel better:  Walk around lake, video call supportive friends and family, a few deep breaths upon recognizing signs of increased anxiety;      Things that I am able to do with others to cope or help me better: talking with friends, sharing mutual interests,     Things I can use or do for distraction: Playing Theremin    Changes I can make to support my mental health and wellness: Please talk with Dipika about a non addictive anxiety medication, continue to take phenomenal care of self in all dimensions    People in my life that I can ask for help: friends and family     Your [facility] has a mental health crisis team you can call 24/7: [phone]    Other things that are important when I m in crisis: Please watch for signs of being tired or stressed in order to get in front of these to help keep anxiety as manageable as possible.  Please continue to ask for what may be helpful or different than has been offered to get the best care possible    Additional resources and information: [facility] coordinators will follow up with you as we talked about.  Their phone number for [facility] is:[phone]. Take care.  Viridiana

## 2021-09-09 NOTE — DISCHARGE INSTRUCTIONS
Schedule propranolol to twice daily dosing to manage anxiety which may be side effect from your antipsychotics. You can take an additional dose for breakthrough symptoms. Please follow-up with established psychiatric provider for ongoing med monitoring and management and refills  Follow-up established care and services    If I am feeling unsafe or I am in a crisis, I will:  Contact my established care providers   Call the National Suicide Prevention Lifeline: [phone]   Go to the nearest emergency room   Call 911      Warning signs that I or other people might notice when a crisis is developing for me:  Somatic symptoms;  Racing heart, spinning thoughts, anxiety about increasing anxiety     Things I am able to do on my own to cope or help me feel better:  Walk around lake, video call supportive friends and family, a few deep breaths upon recognizing signs of increased anxiety;      Things that I am able to do with others to cope or help me better: talking with friends, sharing mutual interests,     Things I can use or do for distraction: Playing Theremin    Changes I can make to support my mental health and wellness: Please talk with Dipika about a non addictive anxiety medication, continue to take phenomenal care of self in all dimensions    People in my life that I can ask for help: friends and family     Your [facility] has a mental health crisis team you can call 24/7: [phone]    Other things that are important when I m in crisis: Please watch for signs of being tired or stressed in order to get in front of these to help keep anxiety as manageable as possible.  Please continue to ask for what may be helpful or different than has been offered to get the best care possible    Additional resources and information: [facility] coordinators will follow up with you as we talked about.  Their phone number for [facility] is:[phone]. Take care.  Viridiana

## 2021-09-09 NOTE — ED TRIAGE NOTES
Pt states increasing voices screaming in her head, paranoid thoughts and increasing depression.  Pt states she is taking her meds but they are not working. Pt states this is causing her to feel like life is not worth living.  Pt denies having taken anything in an attempt to harm herself.

## 2021-09-09 NOTE — ED PROVIDER NOTES
ED Provider Note  [facility]      History     Chief Complaint   Patient presents with     Paranoid     Suicidal     HPI  Nay Lainez is a 30 year old gender dysphoric adult who has history of schizoaffective disorder who is brought in by police as she called them due to feeling overwhelmed with anxiety. Patient sees Dipika Adan and is currently prescribed 2 antipsychotics (Zyprexa and Latuda) which she admits to taking but it is not helping manage her anxiety. Patient admits to having her paranoia and psychosis being controlled, but feels her provider has no other solution to managing her breakthrough and persistent anxiety. She now feels helpless and hopeless and suicidal. She comes here seeking help with managing her unbearable anxiety.    Please see [facility] Crisis Assessment on 9/9/21 in Epic for further details.    PERSONAL MEDICAL HISTORY  Past Medical History:   Diagnosis Date     Anxiety      Depressive disorder      Male-to-female transgender person      PAST SURGICAL HISTORY  Past Surgical History:   Procedure Laterality Date     BREAST SURGERY      augmentation     COSMETIC SURGERY      lip and nose nurgeries     ORCHIECTOMY SCROTAL Bilateral 11/2/2018    Procedure: Bilateral Orchiectomy;  Surgeon: Javid Kendlal MD;  Location:  OR     FAMILY HISTORY  Family History   Problem Relation Age of Onset     Cancer No family hx of      Coronary Artery Disease No family hx of      SOCIAL HISTORY  Social History     Tobacco Use     Smoking status: Current Every Day Smoker     Packs/day: 2.00     Smokeless tobacco: Never Used     Tobacco comment: currently vapes nicotine   Substance Use Topics     Alcohol use: No     Comment: had 4 shots of vodak today     MEDICATIONS  Current Facility-Administered Medications   Medication     OLANZapine zydis (zyPREXA) ODT tab 5 mg     propranolol (INDERAL) tablet 10 mg     Current Outpatient Medications   Medication     benztropine (COGENTIN) 0.5 MG  tablet     emtricitabine-tenofovir (TRUVADA) 200-300 MG per tablet     estradiol cypionate (DEPO-ESTRADIOL) 5 MG/ML injection     estradiol valerate (DELESTROGEN) 20 MG/ML injection     finasteride (PROSCAR) 5 MG tablet     lurasidone (LATUDA) 60 MG TABS tablet     OLANZapine (ZYPREXA) 5 MG tablet     PHENobarbital (LUMINAL) 32.4 MG tablet     traZODone (DESYREL) 100 MG tablet     ALLERGIES  Allergies   Allergen Reactions     Other Environmental Allergy      spiders        Review of Systems   Constitutional: Positive for activity change.   HENT: Negative.    Eyes: Negative.    Respiratory: Negative.    Cardiovascular: Negative.    Gastrointestinal: Negative.    Genitourinary: Negative.    Musculoskeletal: Negative.    Skin: Negative.    Neurological: Negative.    Psychiatric/Behavioral: Positive for dysphoric mood and suicidal ideas. Negative for agitation, behavioral problems and hallucinations. The patient is nervous/anxious. The patient is not hyperactive.    All other systems reviewed and are negative.        Physical Exam   BP: 128/87  Pulse: 74  Temp: 97.4  F (36.3  C)  Resp: 18  SpO2: 100 %  Physical Exam  Vitals and nursing note reviewed.   HENT:      Head: Normocephalic.   Eyes:      Pupils: Pupils are equal, round, and reactive to light.   Pulmonary:      Effort: Pulmonary effort is normal.   Musculoskeletal:         General: Normal range of motion.      Cervical back: Normal range of motion.   Neurological:      General: No focal deficit present.      Mental Status: She is alert.   Psychiatric:         Attention and Perception: Attention normal. She does not perceive auditory or visual hallucinations.         Mood and Affect: Mood is anxious and depressed.         Speech: Speech normal.         Behavior: Behavior is withdrawn. Behavior is not agitated, aggressive, hyperactive or combative.         Thought Content: Thought content normal. Thought content is not paranoid or delusional. Thought content does  not include homicidal ideation.         Cognition and Memory: Cognition and memory normal.         Judgment: Judgment normal.         ED Course      Procedures            Results for orders placed or performed during the hospital encounter of 09/09/21   HCG qualitative urine     Status: Normal   Result Value Ref Range    hCG Urine Qualitative Negative Negative   Drug abuse screen 1 urine (ED)     Status: Abnormal   Result Value Ref Range    Amphetamines Urine Screen Negative Screen Negative    Barbiturates Urine Screen Negative Screen Negative    Benzodiazepines Urine Screen Negative Screen Negative    Cannabinoids Urine Screen Positive (A) Screen Negative    Cocaine Urine Screen Negative Screen Negative    Opiates Urine Screen Negative Screen Negative   Urine Drugs of Abuse Screen     Status: Abnormal    Narrative    The following orders were created for panel order Urine Drugs of Abuse Screen.  Procedure                               Abnormality         Status                     ---------                               -----------         ------                     Drug abuse screen 1 urin...[[id number]]  Abnormal            Final result                 Please view results for these tests on the individual orders.     Medications   OLANZapine zydis (zyPREXA) ODT tab 5 mg (has no administration in time range)   propranolol (INDERAL) tablet 10 mg (has no administration in time range)     Patient also uses medical marijuana. She denies drug use, and has history of benzo dependence.       Assessments & Plan (with Medical Decision Making)   Patient with schizoaffective disorder and ARSENIO who feels uncomfortable anxiety. She was given Zyprexa 5 mg and propranolol 10 mg here. She did not feel the effects and was also prescribed a dose of gabapentin 300 mg. Patient then noted benefit from the Zyprexa and propranolol and felt comfortable going home. She plans on talking to her psychiatric provider about further medication  options. I posed the possibility that she may have anxiety from her antipsychotics and the propranolol was hence helping. She would like to continue with a higher dose of propranolol and was given a prescription. Patient elected to take a dose of gabapentin here to see if it would be an option. She can be discharged after she gets her dose.     I have reviewed the nursing notes. I have reviewed the findings, diagnosis, plan and need for follow up with the patient.    New Prescriptions    No medications on file       Final diagnoses:   Schizoaffective disorder, bipolar type (H)   ARSENIO (generalized anxiety disorder)   Gender dysphoria       --  Petr Joy MD  [facility] EMERGENCY DEPARTMENT  9/9/2021     Petr Joy MD  09/09/21 3059

## 2021-09-27 ENCOUNTER — TELEPHONE (OUTPATIENT)
Dept: BEHAVIORAL HEALTH | Facility: CLINIC | Age: 31
End: 2021-09-27

## 2021-09-27 ENCOUNTER — HOSPITAL ENCOUNTER (EMERGENCY)
Facility: CLINIC | Age: 31
Discharge: HOME | End: 2021-09-28
Attending: FAMILY MEDICINE | Admitting: FAMILY MEDICINE
Payer: COMMERCIAL

## 2021-09-27 DIAGNOSIS — F64.0 GENDER DYSPHORIA IN ADULT: ICD-10-CM

## 2021-09-27 DIAGNOSIS — F25.9 SCHIZOAFFECTIVE DISORDER, UNSPECIFIED TYPE (H): ICD-10-CM

## 2021-09-27 DIAGNOSIS — R45.851 SUICIDAL IDEATION: ICD-10-CM

## 2021-09-27 LAB — SARS-COV-2 RNA RESP QL NAA+PROBE: NEGATIVE

## 2021-09-27 PROCEDURE — U0005 INFEC AGEN DETEC AMPLI PROBE: HCPCS | Performed by: FAMILY MEDICINE

## 2021-09-27 PROCEDURE — 99284 EMERGENCY DEPT VISIT MOD MDM: CPT | Performed by: FAMILY MEDICINE

## 2021-09-27 PROCEDURE — 90791 PSYCH DIAGNOSTIC EVALUATION: CPT

## 2021-09-27 PROCEDURE — 99285 EMERGENCY DEPT VISIT HI MDM: CPT | Mod: 25 | Performed by: FAMILY MEDICINE

## 2021-09-27 PROCEDURE — 250N000013 HC RX MED GY IP 250 OP 250 PS 637: Performed by: FAMILY MEDICINE

## 2021-09-27 PROCEDURE — C9803 HOPD COVID-19 SPEC COLLECT: HCPCS | Performed by: FAMILY MEDICINE

## 2021-09-27 RX ORDER — TRAZODONE HYDROCHLORIDE 100 MG/1
100 TABLET ORAL AT BEDTIME
Status: DISCONTINUED | OUTPATIENT
Start: 2021-09-27 | End: 2021-09-28 | Stop reason: HOSPADM

## 2021-09-27 RX ORDER — EMTRICITABINE AND TENOFOVIR DISOPROXIL FUMARATE 200; 300 MG/1; MG/1
1 TABLET, FILM COATED ORAL DAILY
Status: DISCONTINUED | OUTPATIENT
Start: 2021-09-28 | End: 2021-09-28 | Stop reason: HOSPADM

## 2021-09-27 RX ORDER — PROPRANOLOL HYDROCHLORIDE 10 MG/1
10 TABLET ORAL 2 TIMES DAILY PRN
Status: DISCONTINUED | OUTPATIENT
Start: 2021-09-27 | End: 2021-09-28 | Stop reason: HOSPADM

## 2021-09-27 RX ORDER — HYDROXYZINE HYDROCHLORIDE 25 MG/1
25 TABLET, FILM COATED ORAL ONCE
Status: COMPLETED | OUTPATIENT
Start: 2021-09-27 | End: 2021-09-27

## 2021-09-27 RX ORDER — OLANZAPINE 10 MG/1
10 TABLET ORAL AT BEDTIME
Status: DISCONTINUED | OUTPATIENT
Start: 2021-09-27 | End: 2021-09-28 | Stop reason: HOSPADM

## 2021-09-27 RX ORDER — NICOTINE 21 MG/24HR
1 PATCH, TRANSDERMAL 24 HOURS TRANSDERMAL ONCE
Status: COMPLETED | OUTPATIENT
Start: 2021-09-27 | End: 2021-09-28

## 2021-09-27 RX ADMIN — NICOTINE 1 PATCH: 21 PATCH, EXTENDED RELEASE TRANSDERMAL at 17:08

## 2021-09-27 RX ADMIN — OLANZAPINE 10 MG: 10 TABLET, FILM COATED ORAL at 21:32

## 2021-09-27 RX ADMIN — LURASIDONE HYDROCHLORIDE 60 MG: 40 TABLET, FILM COATED ORAL at 21:32

## 2021-09-27 RX ADMIN — TRAZODONE HYDROCHLORIDE 100 MG: 100 TABLET ORAL at 21:32

## 2021-09-27 RX ADMIN — HYDROXYZINE HYDROCHLORIDE 25 MG: 25 TABLET, FILM COATED ORAL at 17:08

## 2021-09-27 NOTE — TELEPHONE ENCOUNTER
S: Himanshu, [facility], 38/T, SI/Delusions    B: Pt male to female  Pt coming in with ncreased delusions that she is dying of cancer or leukemia  Pt endorsing SI with plan to overdose on alcohol  Does have previous attempt with benzo  Hx of AH people taking to her, Pt reports AH managed with meds  Pt has hx of schizoaffective dx and alcohol use  Last inpatient detox 2020 and psych 2018  No current SIB  On Zyprexa and Latuda not always consistent    Patient cleared and ready for behavioral bed placement: Yes  COVID ordered  No medical concerns    A: [place]    R: Pt placed on worklist awaiting mh placement
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
## 2021-09-27 NOTE — ED PROVIDER NOTES
"    [facility] EMERGENCY DEPARTMENT ([facility])  9/27/21    History     Chief Complaint   Patient presents with     Suicidal     Patient presents today after seeing therapist. Patient has been diagnosed with schizophrenia and has been having a difficult time. Patient stated, \"I just don't want to do this anymore.\" Patient plan is to drink; however patient has been sober for 9 months.     The history is provided by the patient and medical records.     Nay Lainez is a 30 year old male to female transgender person with a past medical history significant for schizoaffective disorder, gender dysphoria, suicidal ideation, homicidal ideation, and alcohol abuse who presents to the Emergency Department for evaluation of suicidal ideation.  Patient saw her therapist today who recommended she come to the ED over concerns for the patient's safety.  Patient believes that she is actively dying of cancer or leukemia.  Patient endorses active suicidal ideation with a plan to drink herself to death.  Patient has been sober from alcohol for 9 months.  She was in detox in October 2020.  Patient here cannot contract for safety.  Patient states that she just wants her delusions to stop, and if she has to kill herself to do so she will.  Patient wants to come inpatient.    Past Medical History  Past Medical History:   Diagnosis Date     Anxiety      Depressive disorder      Male-to-female transgender person      Past Surgical History:   Procedure Laterality Date     BREAST SURGERY      augmentation     COSMETIC SURGERY      lip and nose nurgeries     ORCHIECTOMY SCROTAL Bilateral 11/2/2018    Procedure: Bilateral Orchiectomy;  Surgeon: Javid Kendall MD;  Location:  OR     benztropine (COGENTIN) 0.5 MG tablet  emtricitabine-tenofovir (TRUVADA) 200-300 MG per tablet  estradiol cypionate (DEPO-ESTRADIOL) 5 MG/ML injection  estradiol valerate (DELESTROGEN) 20 MG/ML injection  finasteride (PROSCAR) 5 MG tablet  lurasidone " (LATUDA) 60 MG TABS tablet  OLANZapine (ZYPREXA) 5 MG tablet  PHENobarbital (LUMINAL) 32.4 MG tablet  propranolol (INDERAL) 10 MG tablet  traZODone (DESYREL) 100 MG tablet      Allergies   Allergen Reactions     Other Environmental Allergy      spiders     Family History  Family History   Problem Relation Age of Onset     Cancer No family hx of      Coronary Artery Disease No family hx of      Social History   Social History     Tobacco Use     Smoking status: Current Every Day Smoker     Packs/day: 2.00     Smokeless tobacco: Never Used     Tobacco comment: currently vapes nicotine   Substance Use Topics     Alcohol use: No     Comment: sober for 9 months     Drug use: No     Comment: every few days      Past medical history, past surgical history, medications, allergies, family history, and social history were reviewed with the patient. No additional pertinent items.       Review of Systems  A complete review of systems was performed with pertinent positives and negatives noted in the HPI, and all other systems negative.    Physical Exam   BP: 118/82  Pulse: 83  Temp: 98.2  F (36.8  C)  Resp: 14  Weight: 70.8 kg (156 lb)  SpO2: 99 %  Physical Exam  Constitutional:       General: She is not in acute distress.     Appearance: She is not diaphoretic.   HENT:      Head: Atraumatic.      Mouth/Throat:      Pharynx: No oropharyngeal exudate.   Eyes:      General: No scleral icterus.     Pupils: Pupils are equal, round, and reactive to light.   Cardiovascular:      Heart sounds: Normal heart sounds.   Pulmonary:      Effort: No respiratory distress.      Breath sounds: Normal breath sounds.   Abdominal:      General: Bowel sounds are normal.      Palpations: Abdomen is soft.      Tenderness: There is no abdominal tenderness.   Musculoskeletal:         General: No tenderness.   Skin:     General: Skin is warm.      Findings: No rash.   Neurological:      General: No focal deficit present.      Mental Status: She is  oriented to person, place, and time.      Cranial Nerves: No cranial nerve deficit.      Sensory: No sensory deficit.      Motor: No weakness.      Coordination: Coordination normal.   Psychiatric:         Mood and Affect: Mood is anxious and depressed.         Speech: Speech normal.         Behavior: Behavior is cooperative.         Thought Content: Thought content includes suicidal ideation. Thought content includes suicidal plan.         ED Course      Procedures       The medical record was reviewed and interpreted.  Current labs reviewed and interpreted.     She was seen and evaluated by the  please refer to their documentation in the note section of the epic chart dated 9/27/2021  Results for orders placed or performed during the hospital encounter of 09/27/21 (from the past 48 hour(s))   Asymptomatic COVID-19 Virus (Coronavirus) by PCR Nasopharyngeal    Specimen: Nasopharyngeal; Swab   Result Value Ref Range    SARS CoV2 PCR Negative Negative    Narrative    Testing was performed using the Xpert Xpress SARS-CoV-2 Assay on the  Cepheid Gene-Xpert Instrument Systems. Additional information about  this Emergency Use Authorization (EUA) assay can be found via the Lab  Guide. This test should be ordered for the detection of SARS-CoV-2 in  individuals who meet SARS-CoV-2 clinical and/or epidemiological  criteria. Test performance is unknown in asymptomatic patients. This  test is for in vitro diagnostic use under the FDA EUA for  laboratories certified under CLIA to perform high complexity testing.  This test has not been FDA cleared or approved. A negative result  does not rule out the presence of PCR inhibitors in the specimen or  target RNA in concentration below the limit of detection for the  assay. The possibility of a false negative should be considered if  the patient's recent exposure or clinical presentation suggests  COVID-19. This test was validated by the [facility]. This laboratory is certified under  the Clinical Laboratory Improvement Amendments of 1988 (CLIA-88) as  qualified to perform high complexity laboratory testing.               Assessments & Plan (with Medical Decision Making)       I have reviewed the nursing notes. I have reviewed the findings, diagnosis, plan and need for follow up with the patient.    Patient presenting with history of schizoaffective disorder gender dysphoria now presenting with increased depression and suicidal ideation is not able to maintain safety and did not feel as though she could stay safe at home at this time patient will remain in the emergency room until bed becomes available unfortunately there are no beds available at [facility] she will remain in the emergency room tonight.    Final diagnoses:   Schizoaffective disorder, unspecified type (H)   Suicidal ideation   Gender dysphoria in adult     I, Leonarda Whitman, am serving as a trained medical scribe to document services personally performed by Parag Long MD, based on the provider's statements to me.     I, Parag Long MD, was physically present and have reviewed and verified the accuracy of this note documented by Leonarda Whitman.  --  Parag Long MD  [facility] EMERGENCY DEPARTMENT  9/27/2021     Parag Long MD  09/27/21 2001

## 2021-09-27 NOTE — ED NOTES
Pt COVID test sent and Pt given PO med for anxiety and a nicotine patch.  Pt ate lunch with her significant other and is now resting in the chair at this time.

## 2021-09-27 NOTE — SAFE
Nay Lainez  September 27, 2021  SAFE Note    Critical Safety Issues: Nay is a transgender female who presents with concerns of increased paranoia that she is dying of cancer or Leukemia and reported that she is afraid that she will consume large amounts of alcohol as an attempt to kill herself. Pt has hx of schizoaffective disorder, gender dysmorphia, and generalized anxiety disorder. Pt reports hx of auditory hallucinations of hearing people talking to her, and that these have been managed by her taking medications. Pt has hx of alcohol abuse and addiction to benzos. Pt is currently 9 months sober and is concerned she will use alcohol to kill herself. She endorses active SI stating 'I just want it all to stop', with intent, and unable to contract for safety.       Current Suicidal Ideation/Self-Injurious Concerns/Methods: Ingestion of alcohol       Current or Historical Inappropriate Sexual Behavior: No      Current or Historical Aggression/Homicidal Ideation: History of Violence - threats of violence      Triggers: Auditory hallucinations and delusions.     Updated care team: Yes    For additional details see full LMHP assessment.       Himanshu Valadez, MSW, LGSW

## 2021-09-28 ENCOUNTER — TELEPHONE (OUTPATIENT)
Dept: BEHAVIORAL HEALTH | Facility: CLINIC | Age: 31
End: 2021-09-28

## 2021-09-28 VITALS
SYSTOLIC BLOOD PRESSURE: 120 MMHG | OXYGEN SATURATION: 100 % | BODY MASS INDEX: 22.38 KG/M2 | TEMPERATURE: 98.1 F | WEIGHT: 156 LBS | RESPIRATION RATE: 18 BRPM | HEART RATE: 51 BPM | DIASTOLIC BLOOD PRESSURE: 67 MMHG

## 2021-09-28 LAB
AMPHETAMINES UR QL SCN: ABNORMAL
BARBITURATES UR QL: ABNORMAL
BENZODIAZ UR QL: ABNORMAL
CANNABINOIDS UR QL SCN: ABNORMAL
COCAINE UR QL: ABNORMAL
OPIATES UR QL SCN: ABNORMAL
SARS-COV-2 RNA RESP QL NAA+PROBE: NEGATIVE

## 2021-09-28 PROCEDURE — 250N000013 HC RX MED GY IP 250 OP 250 PS 637: Performed by: PSYCHIATRY & NEUROLOGY

## 2021-09-28 PROCEDURE — 250N000013 HC RX MED GY IP 250 OP 250 PS 637: Performed by: FAMILY MEDICINE

## 2021-09-28 PROCEDURE — 250N000013 HC RX MED GY IP 250 OP 250 PS 637: Performed by: EMERGENCY MEDICINE

## 2021-09-28 PROCEDURE — 80307 DRUG TEST PRSMV CHEM ANLYZR: CPT | Performed by: PSYCHIATRY & NEUROLOGY

## 2021-09-28 PROCEDURE — U0003 INFECTIOUS AGENT DETECTION BY NUCLEIC ACID (DNA OR RNA); SEVERE ACUTE RESPIRATORY SYNDROME CORONAVIRUS 2 (SARS-COV-2) (CORONAVIRUS DISEASE [COVID-19]), AMPLIFIED PROBE TECHNIQUE, MAKING USE OF HIGH THROUGHPUT TECHNOLOGIES AS DESCRIBED BY CMS-2020-01-R: HCPCS | Performed by: PSYCHIATRY & NEUROLOGY

## 2021-09-28 RX ORDER — IBUPROFEN 600 MG/1
600 TABLET, FILM COATED ORAL ONCE
Status: COMPLETED | OUTPATIENT
Start: 2021-09-28 | End: 2021-09-28

## 2021-09-28 RX ORDER — HYDROXYZINE HYDROCHLORIDE 25 MG/1
25 TABLET, FILM COATED ORAL EVERY 6 HOURS PRN
Status: DISCONTINUED | OUTPATIENT
Start: 2021-09-28 | End: 2021-09-28 | Stop reason: HOSPADM

## 2021-09-28 RX ORDER — NICOTINE 21 MG/24HR
1 PATCH, TRANSDERMAL 24 HOURS TRANSDERMAL ONCE
Status: DISCONTINUED | OUTPATIENT
Start: 2021-09-28 | End: 2021-09-28 | Stop reason: HOSPADM

## 2021-09-28 RX ADMIN — HYDROXYZINE HYDROCHLORIDE 25 MG: 25 TABLET, FILM COATED ORAL at 15:37

## 2021-09-28 RX ADMIN — IBUPROFEN 600 MG: 600 TABLET ORAL at 17:01

## 2021-09-28 RX ADMIN — HYDROXYZINE HYDROCHLORIDE 25 MG: 25 TABLET, FILM COATED ORAL at 10:31

## 2021-09-28 RX ADMIN — NICOTINE POLACRILEX 4 MG: 4 GUM, CHEWING BUCCAL at 09:02

## 2021-09-28 RX ADMIN — EMTRICITABINE AND TENOFOVIR DISOPROXIL FUMARATE 1 TABLET: 200; 300 TABLET, FILM COATED ORAL at 09:11

## 2021-09-28 RX ADMIN — NICOTINE 1 PATCH: 21 PATCH, EXTENDED RELEASE TRANSDERMAL at 17:24

## 2021-09-28 RX ADMIN — LURASIDONE HYDROCHLORIDE 60 MG: 40 TABLET, FILM COATED ORAL at 17:01

## 2021-09-28 RX ADMIN — PROPRANOLOL HYDROCHLORIDE 10 MG: 10 TABLET ORAL at 04:15

## 2021-09-28 NOTE — ED NOTES
Pt given dinner tray and ate.  Pt prior to that was up occasionally taking laps around the open area.

## 2021-09-28 NOTE — ED NOTES
[facility] ED Mental Health Handoff Note:       Brief HPI:  This is a 30 year old adult signed out to me by Dr. Singer.  See initial ED Provider note for full details of the presentation. Interval history is pertinent for depressions and hallucination.  Can be reassessed if wants to go..    Home meds reviewed and ordered/administered: Yes    Medically stable for inpatient mental health admission: Yes.    Evaluated by mental health: Yes. The recommendation is for inpatient mental health treatment. Bed search in process    Safety concerns: At the time I received sign out, there were no safety concerns.    Hold Status:  Active Orders   N/A            Exam:   Patient Vitals for the past 24 hrs:   BP Temp Temp src Pulse Resp SpO2 Weight   09/27/21 1409 118/82 98.2  F (36.8  C) Oral 83 14 99 % 70.8 kg (156 lb)           ED Course:    Medications   nicotine (NICODERM CQ) 21 MG/24HR 24 hr patch 1 patch (1 patch Transdermal Patch/Med Applied 9/27/21 1708)   emtricitabine-tenofovir (TRUVADA) 200-300 MG per tablet 1 tablet (has no administration in time range)   lurasidone (LATUDA) tablet 60 mg (60 mg Oral Given 9/27/21 2132)   OLANZapine (zyPREXA) tablet 10 mg (10 mg Oral Given 9/27/21 2132)   propranolol (INDERAL) tablet 10 mg (10 mg Oral Given 9/28/21 0415)   traZODone (DESYREL) tablet 100 mg (100 mg Oral Given 9/27/21 2132)   hydrOXYzine (ATARAX) tablet 25 mg (25 mg Oral Given 9/27/21 1708)            There were no significant events during my shift.    Patient was signed out to the oncoming provider,        Impression:    ICD-10-CM    1. Schizoaffective disorder, unspecified type (H)  F25.9    2. Suicidal ideation  R45.851    3. Gender dysphoria in adult  F64.0        Plan:    1. Awaiting inpatient mental health admission/transfer.      RESULTS:   Results for orders placed or performed during the hospital encounter of 09/27/21 (from the past 24 hour(s))   Asymptomatic COVID-19 Virus (Coronavirus) by PCR  Nasopharyngeal     Status: Normal    Collection Time: 09/27/21  5:10 PM    Specimen: Nasopharyngeal; Swab   Result Value Ref Range    SARS CoV2 PCR Negative Negative    Narrative    Testing was performed using the Xpert Xpress SARS-CoV-2 Assay on the  Cepheid Gene-Xpert Instrument Systems. Additional information about  this Emergency Use Authorization (EUA) assay can be found via the Lab  Guide. This test should be ordered for the detection of SARS-CoV-2 in  individuals who meet SARS-CoV-2 clinical and/or epidemiological  criteria. Test performance is unknown in asymptomatic patients. This  test is for in vitro diagnostic use under the FDA EUA for  laboratories certified under CLIA to perform high complexity testing.  This test has not been FDA cleared or approved. A negative result  does not rule out the presence of PCR inhibitors in the specimen or  target RNA in concentration below the limit of detection for the  assay. The possibility of a false negative should be considered if  the patient's recent exposure or clinical presentation suggests  COVID-19. This test was validated by the [facility]. This laboratory is certified under  the Clinical Laboratory Improvement Amendments of 1988 (CLIA-88) as  qualified to perform high complexity laboratory testing.               MD Shukri Valdez, Bharath Hua MD  09/28/21 0706

## 2021-09-28 NOTE — ED NOTES
Nay has been calm and cooperative. States that she feels increasingly paranoid this morning. Denies hallucinations and suicidal thoughts.

## 2021-09-28 NOTE — TELEPHONE ENCOUNTER
R: Bed search update: [facility] only.    [facility] at [facility].   [facility] is at capacity.   [facility] is at capacity.   [facility] is at capacity.     Pt remains on waitlist pending available bed.

## 2021-09-28 NOTE — ED NOTES
Pt moved to room  from room 1. Stated that it was difficult to sleep in the recliner. Pt requested medication to help her sleep

## 2021-09-28 NOTE — PHARMACY-ADMISSION MEDICATION HISTORY
Admission Medication History Completed by Pharmacy    See [facility] Admission Navigator for allergy information, preferred outpatient pharmacy, prior to admission medications and immunization status.     Medication history sources:  patient interview, [facility]    Pertinent changes made to PTA medication list:  Added: N/A  Deleted:   - Cogentin, phenobarbital  Changed:   - estradiol 3 mg q6days-->4 mg q7days (Mondays, missed yesterday's dose)  - olanzapine 5-->10 mg HS    Additional medication history information:   - Reviewed [employer]s lurasidone dispense history with patient, she is only taking 60 mg daily.  - Patient denies taking any additional Rx/OTC medications other than the ones listed below.    Prior to Admission medications    Medication Sig Last Dose Taking? Auth Provider   emtricitabine-tenofovir (TRUVADA) 200-300 MG per tablet Take 1 tablet by mouth daily  Yes Reported, Patient   estradiol valerate (DELESTROGEN) 20 MG/ML injection Inject 4 mg into the muscle every 7 days - on Mondays 9/20/2021 Yes Reported, Patient   finasteride (PROSCAR) 5 MG tablet Take 2.5 mg by mouth every other day  Past Week Yes Reported, Patient   lurasidone (LATUDA) 60 MG TABS tablet Take 60 mg by mouth daily (with dinner)  9/27/2021 Yes Unknown, Entered By History   OLANZapine (ZYPREXA) 5 MG tablet Take 10 mg by mouth At Bedtime  9/27/2021 Yes Unknown, Entered By History     Date completed: 09/28/21    Medication history completed by:   Javid Mandel, PharmD, BCPS  [facility]  Emergency Department: Ascom *[id number]

## 2021-09-28 NOTE — TELEPHONE ENCOUNTER
R: Padmini approved overflowing pt to [facility] since no other bed options currently, pending provider's acceptance              mbw  paged Dipika at 2:36 pm              mbw  Author asked Dipika to accept for [facility] but she said she wants to discuss with Padmini first as milieu is acute; she said she will call intake back             luz

## 2021-09-29 NOTE — ED PROVIDER NOTES
Emergency Department Patient Sign-out       Brief HPI:  This is a 30 year old adult signed out to me by Dr. Barahona .  See initial ED Provider note for details of the presentation.          Patient is medically cleared for admission to a Behavioral Health unit.      The patient is not on a hold.      The patient has not required medication for agitation.    Patient was seen and originally was unable to contract for safety.  There are a 30-year-old with a past medical history of schizoaffective disorder, gender dysphoria, and prior suicidal ideation.  Patient recently feeling frustrated and having thoughts about drinking themselves to drop.  Reported some delusional thinking about dying from cancer or leukemia.  Did not believe they could contract for safety and so were placed in the queue for behavioral admission after medical clearance.        Significant Events prior to my assuming care: Patient has been awaiting an inpatient bed for more than 24 hours now.  Patient requested to see me and stated they would like to be discharged.      Exam:   Patient Vitals for the past 24 hrs:   BP Temp Temp src Pulse Resp SpO2   09/28/21 1858 120/67 98.1  F (36.7  C) Oral 51 18 100 %   09/28/21 1036 115/78 96.9  F (36.1  C) Oral 76 16 99 %     Mental Status Exam:    Appearance: Normal  Attitude: Good  Eye contact: Good  Mood: Anxious  Affect: Normal  Speech: Fluent  Psychomotor behavior: No tardive dyskinesia, no dystonia, no tics  Thought Process:  logical, lnear  Associations: No loose associations  Thought content: Currently denies suicidal ideation, currently denies homicidal ideation,   no current signs of psychosis  Insight: Good  Judgement: Good  Orientation: Patient is normally oriented x3  Attention span: Normal  Recent/ remote memory:  intact  Language: Normal  Fund of knowledge:  appropriate for age  Gait and station:  normal      ED RESULTS:   Results for orders placed or performed during the hospital encounter  of 09/27/21 (from the past 24 hour(s))   Asymptomatic COVID-19 Virus (Coronavirus) by PCR Oropharynx     Status: Normal    Collection Time: 09/28/21  4:50 PM    Specimen: Oropharynx; Swab   Result Value Ref Range    SARS CoV2 PCR Negative Negative    Narrative    Testing was performed using the Xpert Xpress SARS-CoV-2 Assay on the  Cepheid Gene-Xpert Instrument Systems. Additional information about  this Emergency Use Authorization (EUA) assay can be found via the Lab  Guide. This test should be ordered for the detection of SARS-CoV-2 in  individuals who meet SARS-CoV-2 clinical and/or epidemiological  criteria. Test performance is unknown in asymptomatic patients. This  test is for in vitro diagnostic use under the FDA EUA for  laboratories certified under CLIA to perform high complexity testing.  This test has not been FDA cleared or approved. A negative result  does not rule out the presence of PCR inhibitors in the specimen or  target RNA in concentration below the limit of detection for the  assay. The possibility of a false negative should be considered if  the patient's recent exposure or clinical presentation suggests  COVID-19. This test was validated by the [facility]ise[facility]. This laboratory is certified under  the Clinical Laboratory Improvement Amendments of 1988 (CLIA-88) as  qualified to perform high complexity laboratory testing.     Urine Drugs of Abuse Screen     Status: Abnormal    Collection Time: 09/28/21  4:52 PM    Narrative    The following orders were created for panel order Urine Drugs of Abuse Screen.  Procedure                               Abnormality         Status                     ---------                               -----------         ------                     Drug abuse screen 1 urin...[[id number]]  Abnormal            Final result                 Please view results for these tests on the individual orders.   Drug abuse screen 1 urine  (ED)     Status: Abnormal    Collection Time: 09/28/21  4:52 PM   Result Value Ref Range    Amphetamines Urine Screen Negative Screen Negative    Barbiturates Urine Screen Negative Screen Negative    Benzodiazepines Urine Screen Negative Screen Negative    Cannabinoids Urine Screen Positive (A) Screen Negative    Cocaine Urine Screen Negative Screen Negative    Opiates Urine Screen Negative Screen Negative       ED MEDICATIONS:   Medications   emtricitabine-tenofovir (TRUVADA) 200-300 MG per tablet 1 tablet (1 tablet Oral Given 9/28/21 0911)   lurasidone (LATUDA) tablet 60 mg (60 mg Oral Given 9/28/21 1701)   OLANZapine (zyPREXA) tablet 10 mg (10 mg Oral Given 9/27/21 2132)   propranolol (INDERAL) tablet 10 mg (10 mg Oral Given 9/28/21 0415)   traZODone (DESYREL) tablet 100 mg (100 mg Oral Given 9/27/21 2132)   hydrOXYzine (ATARAX) tablet 25 mg (25 mg Oral Given 9/28/21 1537)   nicotine (NICODERM CQ) 21 MG/24HR 24 hr patch 1 patch (1 patch Transdermal Patch/Med Applied 9/28/21 1724)   hydrOXYzine (ATARAX) tablet 25 mg (25 mg Oral Given 9/27/21 1708)   nicotine (NICODERM CQ) 21 MG/24HR 24 hr patch 1 patch (1 patch Transdermal Patch/Med Removed 9/28/21 1720)   nicotine polacrilex (NICORETTE) gum 4 mg (4 mg Buccal Given 9/28/21 0902)   ibuprofen (ADVIL/MOTRIN) tablet 600 mg (600 mg Oral Given 9/28/21 1701)         Impression:    ICD-10-CM    1. Schizoaffective disorder, unspecified type (H)  F25.9    2. Suicidal ideation  R45.851    3. Gender dysphoria in adult  F64.0        Plan:    Pending studies include none.  Patient requested to speak with the physician.  They stated they had spoken with her outpatient prescriber and discuss some medication changes.  Patient is no longer feeling unsafe or suicidal and states they no longer feel that they have uncontrollable delusional thinking.  They see their therapist twice weekly, and have a pending appointment.  Patient believes they can contract for safety, is not showing  any signs of psychosis, has a normal exam, and does not appear to meet grounds for involuntary hold.  Patient will be discharged as per their wishes.  Patient agrees to return if it anytime they feel unsafe.      MD Cheyenne Powell David, MD  09/28/21 7350

## 2021-09-29 NOTE — ED NOTES
Pt was given discharge instructions and was shown what medications she had taken here, she had no further questions or concerns. Vital signs stable. Pt was given back her belongings.

## 2021-09-29 NOTE — DISCHARGE INSTRUCTIONS
Please follow-up with your therapist, and with your medication provider for the prescribed changes discussed.  Return to ED if at anytime you feel unable to keep yourself safe in the community.  See resources below.    Indications for return to Emergency Department or to seek further assistance:  Thoughts of harm to self or others that you feel you may act on  Thoughts of suicide  Feeling unsafe  Major changes in mood, sleep or apetite  Difficulty concentrating or completing regular daily tasks/ functions  Feelings of paranoia, or feelings that you are losing touch with reality  Resources for Mental Health:  *(asterisk indicates free service)    *National Suicide Prevention Lifeline  1-247.448.5137     *[facility]'s National Help Line  (Treatment Referral Routing for Mental & Chemical Health)  1-338.437.1990      *Walk-In Counseling Center- [facility]  [address], [place]  [phone]  Mon, Wed, & Fri 1PM-3PM  Mon, Tues, Wed, & Thu 630PM-830PM  (no appointment needed)    *Walk-In Counseling Center- [facility]  [address], [place], [place]  Tue & Thu 6PM-8PM  (no appointment needed)    *[address], [place], [place]  [phone]  [email or web address]      [facility] Crisis Lines    [facility] Crisis Line  [phone] [facility] Crisis Line  [phone]   [facility] Crisis Line  [phone] [facility] Crisis Line  [phone]   [facility] Crisis Line  [phone] [facility] Crisis Line  [phone] for 8AM-430PM  [phone] for 430PM-8AM   [facility] Crisis Line  [phone] [facility] Crisis Line  [phone]     Outpatient Mental Health Clinics   *[facility]  [address] [place], [place]  [phone] *[facility]  [address] [place], [place]  [phone]    *[facility] ([facility])  [address] [place], [place]  [phone] Health Counseling  Services  [address] [place] [address]  [phone]    [facility]  [address]  [place], [place]  [phone] [facility]  [address]  [place], [place]  [phone]   [facility]  [address] [place], [place]  [phone] [facility]  [address]  [place], [place]  [phone]   [facility])  [address] [place], [place]  [phone] [facility]  [address]  [place], [place]   [phone]   [facility]  [address]   [phone] Behavioral Health and Wellness Clinic  [address] [address] [phone]   *[facility] Crisis: COPE: (101.601.3848) 24 hour mobile crisis support for people having a mental health crisis in [facility].   *Acute Psychiatric Services ([phone]). 24-hour walk-in crisis psychiatric support at [facility]; Emergency Medications Clinic available 7:30am - 2:00pm  *Crisis Connection: ([phone]) 24-hour confidential telephone counseling   *[facility] Emergency Room: [phone]  *[place] [facility] ([facility]) : [facility] connects people seeking recovery to resources that help foster and sustain long-term recovery. Whether you are seeking resources for treatment, transportation, housing, job training, education, health or other pathways to recovery, [facility] is a great place to start. [phone] [email or web address]

## 2021-09-29 NOTE — TELEPHONE ENCOUNTER
0120 Bed Search Update:    [facility]  [facility] beds available  [facility] beds available  [facility]  [facility]-No beds available  [facility]-No beds available  [facility]-No beds available    Per chart review, management review pending in AM for possible overflow to 4A.  Pt remains on wait list pending bed availability.

## 2021-09-29 NOTE — TELEPHONE ENCOUNTER
R:  Patient cleared and ready for behavioral bed placement: Yes   UTOX Positive for Cannabis  COVID Negative    Provider paged @9:43a, & 10:21am to present for unit 20/Rian  Dr Kent called back at 10:46 and Accepted for 20/Rian  Pt placed in unit que and saw Pt was recently discharge from the ED within last 1/2 hour.  Pt removed from the worklist

## 2021-10-16 ENCOUNTER — HEALTH MAINTENANCE LETTER (OUTPATIENT)
Age: 31
End: 2021-10-16

## 2022-05-28 ENCOUNTER — HEALTH MAINTENANCE LETTER (OUTPATIENT)
Age: 32
End: 2022-05-28

## 2022-09-15 NOTE — CONSULTS
Urology Brief Note       Nay is a 27 year old transgender female s/p bilateral simple orchiectomy on 11/02 who is admitted for benzodiazepine detox, who complained of right inguinal pain that started few days prior to admission. No fever, chills, no skin chnages but the patient reports feeling a bump in the skin. No trama to the area      Exam:   VSS, afebrile   : incision in scrotum C/D/I, healing well, no scrotal swelling, bruising or skin changes   circumcised penis with no swelling or skin changes, peiricing in place   Right inguinal swelling along the inguinal canal with warmth with tenderness, no skin changes   Left inguinal area with no swelling or tenderness       A/P  28 yo transgender female 20 days post simple orchiectomy with right inguinal groin swelling and tenderness suggestive of post-op hematoma, less likely infection     - recommend conservative management with ice packing (TID to QID for 10-15 min), scheduled NSAID and Tylenol   - please contact urology for any change in exam findings including skin changes or worsening swelling   - otherwise will follow up in 1 week for symptom check and re-examination       Discussed with Dr Enoch Jurado MD  Urology PGY4     No

## 2022-09-25 ENCOUNTER — HEALTH MAINTENANCE LETTER (OUTPATIENT)
Age: 32
End: 2022-09-25

## 2023-06-04 ENCOUNTER — HEALTH MAINTENANCE LETTER (OUTPATIENT)
Age: 33
End: 2023-06-04

## 2024-07-20 ENCOUNTER — HEALTH MAINTENANCE LETTER (OUTPATIENT)
Age: 34
End: 2024-07-20

## (undated) DEVICE — SU SILK 0 TIE 6X30" A306H

## (undated) DEVICE — GLOVE PROTEXIS W/NEU-THERA 7.5  2D73TE75

## (undated) DEVICE — DRAIN PENROSE 0.25"X18" LATEX FREE GR201

## (undated) DEVICE — PREP SKIN SCRUB TRAY 4461A

## (undated) DEVICE — LINEN TOWEL PACK X6 WHITE 5487

## (undated) DEVICE — SU MONOCRYL 4-0 PS-2 27" UND Y426H

## (undated) DEVICE — SYR 10ML LL W/O NDL 302995

## (undated) DEVICE — DRAPE LAP W/ARMBOARD 29410

## (undated) DEVICE — SUCTION TIP YANKAUER W/O VENT K86

## (undated) DEVICE — LINEN TOWEL PACK X5 5464

## (undated) DEVICE — SOL NACL 0.9% IRRIG 1000ML BOTTLE 2F7124

## (undated) DEVICE — BLADE KNIFE SURG 10 371110

## (undated) DEVICE — ESU GROUND PAD ADULT W/CORD E7507

## (undated) DEVICE — SU DERMABOND ADVANCED .7ML DNX12

## (undated) DEVICE — PAD CHUX UNDERPAD 23X24" 7136

## (undated) DEVICE — SU VICRYL 3-0 SH 27" J316H

## (undated) DEVICE — SU VICRYL 0 CT-2 27" J334H

## (undated) DEVICE — SPONGE KITTNER 30-101

## (undated) DEVICE — SUCTION MANIFOLD NEPTUNE 2 SYS 1 PORT 702-025-000

## (undated) DEVICE — PACK MINOR CUSTOM ASC

## (undated) DEVICE — SU VICRYL 4-0 PS-2 18" UND J496G

## (undated) DEVICE — DRSG KERLIX FLUFFS X5

## (undated) DEVICE — SYR BULB IRRIG 50ML LATEX FREE 0035280

## (undated) DEVICE — ESU ELEC BLADE 2.75" COATED/INSULATED E1455

## (undated) RX ORDER — BUPIVACAINE HYDROCHLORIDE AND EPINEPHRINE 5; 5 MG/ML; UG/ML
INJECTION, SOLUTION EPIDURAL; INTRACAUDAL; PERINEURAL
Status: DISPENSED
Start: 2018-11-02

## (undated) RX ORDER — CEFAZOLIN SODIUM 2 G/50ML
SOLUTION INTRAVENOUS
Status: DISPENSED
Start: 2018-11-02

## (undated) RX ORDER — HYDROMORPHONE HYDROCHLORIDE 1 MG/ML
INJECTION, SOLUTION INTRAMUSCULAR; INTRAVENOUS; SUBCUTANEOUS
Status: DISPENSED
Start: 2018-11-02

## (undated) RX ORDER — OXYCODONE HYDROCHLORIDE 5 MG/1
TABLET ORAL
Status: DISPENSED
Start: 2018-11-02

## (undated) RX ORDER — FENTANYL CITRATE 50 UG/ML
INJECTION, SOLUTION INTRAMUSCULAR; INTRAVENOUS
Status: DISPENSED
Start: 2018-11-02

## (undated) RX ORDER — ACETAMINOPHEN 325 MG/1
TABLET ORAL
Status: DISPENSED
Start: 2018-11-02

## (undated) RX ORDER — GABAPENTIN 300 MG/1
CAPSULE ORAL
Status: DISPENSED
Start: 2018-11-02